# Patient Record
Sex: MALE | Race: WHITE | Employment: OTHER | URBAN - NONMETROPOLITAN AREA
[De-identification: names, ages, dates, MRNs, and addresses within clinical notes are randomized per-mention and may not be internally consistent; named-entity substitution may affect disease eponyms.]

---

## 2018-01-16 ENCOUNTER — OFFICE VISIT (OUTPATIENT)
Dept: SURGERY | Age: 63
End: 2018-01-16

## 2018-01-16 VITALS
RESPIRATION RATE: 16 BRPM | SYSTOLIC BLOOD PRESSURE: 128 MMHG | DIASTOLIC BLOOD PRESSURE: 76 MMHG | WEIGHT: 250.1 LBS | OXYGEN SATURATION: 96 % | HEART RATE: 84 BPM | BODY MASS INDEX: 35.89 KG/M2

## 2018-01-16 DIAGNOSIS — H02.9 LESION OF LOWER EYELID: Primary | ICD-10-CM

## 2018-01-16 PROCEDURE — 99202 OFFICE O/P NEW SF 15 MIN: CPT | Performed by: SURGERY

## 2018-01-16 RX ORDER — GABAPENTIN 300 MG/1
300 CAPSULE ORAL 4 TIMES DAILY
Status: ON HOLD | COMMUNITY
End: 2021-05-10

## 2018-01-16 NOTE — PROGRESS NOTES
carvedilol (COREG) 12.5 MG tablet Take 12.5 mg by mouth 2 times daily (with meals)      metFORMIN (GLUCOPHAGE) 1000 MG tablet Take 1,000 mg by mouth 2 times daily (with meals).  glimepiride (AMARYL) 4 MG tablet Take 4 mg by mouth 2 times daily.  lisinopril (PRINIVIL;ZESTRIL) 20 MG tablet Take 20 mg by mouth daily.  ibuprofen (ADVIL;MOTRIN) 200 MG tablet Take 200 mg by mouth every 6 hours as needed for Pain. No current facility-administered medications on file prior to visit. No Known Allergies    Review of Systems  Pertinent items are noted in HPI. Objective:      Physical Exam  General Skin Exam:  no rashes, no ecchymoses     Lesion    Location:   right lower eyelid   Color:  white    Diameter:  1 mm   Border/Symmetry:  elevated. Inflammation:  absent   Adhesion:  is adherent to adjacent tissues. Assessment:      Lesion of the right lower eyelid. Plan: Will refer to eye surgeon for evaluation.

## 2018-06-12 ENCOUNTER — HOSPITAL ENCOUNTER (OUTPATIENT)
Dept: GENERAL RADIOLOGY | Age: 63
Discharge: HOME | End: 2018-06-12
Attending: FAMILY MEDICINE | Admitting: FAMILY MEDICINE

## 2018-06-12 DIAGNOSIS — R05.9 COUGH: ICD-10-CM

## 2019-05-01 NOTE — PROGRESS NOTES
1. Do not eat or drink anything after 12 midnight (or____hours) unless instructed by your doctor prior to surgery. This includes no water, chewing gum or mints. 2. Follow your directions as prescribed by the doctor for your procedure and medications. 3.Check with your Doctor regarding stopping Plavix, Coumadin, Lovenox,Effient,Pradaxa,Xarelto, Fragmin or other blood thinners and follow their instructions. 4. Do not smoke, and do not drink any alcoholic beverages 24 hours prior to surgery. This includes NA Beer. 5. You may brush your teeth and gargle the morning of surgery. DO NOT SWALLOW WATER   6. You MUST make arrangements for a responsible adult to take you home after your surgery and be able to check on you every couple hours for the day. You will not be allowed to leave alone or drive yourself home. It is strongly suggested someone stay with you the first 24 hrs. Your surgery will be cancelled if you do not have a ride home. 7. Please wear simple, loose fitting clothing to the hospital.  Alvera Factor not bring valuables (money, credit cards, checkbooks, etc.) Do not wear any makeup (including no eye makeup) or nail polish on your fingers or toes. 8. DO NOT wear any jewelry or piercings on day of surgery. All body piercing jewelry must be removed. 9. If you have dentures, they will be removed before going to the OR; we will provide you a container. If you wear contact lenses or glasses, they will be removed; please bring a case for them. 10. If you  have a Living Will and Durable Power of  for Healthcare, please bring in a copy. 11 Please bring picture ID,  insurance card, paperwork from the doctors office    (H & P, Consent, & card for implantable devices).

## 2019-05-06 ENCOUNTER — ANESTHESIA EVENT (OUTPATIENT)
Dept: OPERATING ROOM | Age: 64
End: 2019-05-06
Payer: MEDICAID

## 2019-05-06 NOTE — ANESTHESIA PRE PROCEDURE
Department of Anesthesiology  Preprocedure Note       Name:  Axel Lagos   Age:  66 y.o.  :  1955                                          MRN:  [id number]         Date:  2019      Surgeon: Jeff Cannon):  Vilma Oshea MD    Procedure: COLORECTAL CANCER SCREENING, HIGH RISK (N/A )    Medications prior to admission:   Prior to Admission medications    Medication Sig Start Date End Date Taking? Authorizing Provider   gabapentin (NEURONTIN) 300 MG capsule Take 300 mg by mouth 4 times daily. Historical Provider, MD   sitaGLIPtin (JANUVIA) 100 MG tablet Take 100 mg by mouth daily    Historical Provider, MD   carvedilol (COREG) 12.5 MG tablet Take 12.5 mg by mouth 2 times daily (with meals)    Historical Provider, MD   metFORMIN (GLUCOPHAGE) 1000 MG tablet Take 1,000 mg by mouth 2 times daily (with meals). Historical Provider, MD   glimepiride (AMARYL) 4 MG tablet Take 4 mg by mouth 2 times daily. Historical Provider, MD   lisinopril (PRINIVIL;ZESTRIL) 20 MG tablet Take 20 mg by mouth daily. Historical Provider, MD   ibuprofen (ADVIL;MOTRIN) 200 MG tablet Take 200 mg by mouth every 6 hours as needed for Pain. Historical Provider, MD       Current medications:    No current facility-administered medications for this encounter. Current Outpatient Medications   Medication Sig Dispense Refill    gabapentin (NEURONTIN) 300 MG capsule Take 300 mg by mouth 4 times daily.  sitaGLIPtin (JANUVIA) 100 MG tablet Take 100 mg by mouth daily      carvedilol (COREG) 12.5 MG tablet Take 12.5 mg by mouth 2 times daily (with meals)      metFORMIN (GLUCOPHAGE) 1000 MG tablet Take 1,000 mg by mouth 2 times daily (with meals).  glimepiride (AMARYL) 4 MG tablet Take 4 mg by mouth 2 times daily.  lisinopril (PRINIVIL;ZESTRIL) 20 MG tablet Take 20 mg by mouth daily.  ibuprofen (ADVIL;MOTRIN) 200 MG tablet Take 200 mg by mouth every 6 hours as needed for Pain.          Allergies:  No Known Allergies    Problem List:  There is no problem list on file for this patient. Past Medical History:        Diagnosis Date    Acid reflux     Diabetes mellitus ([address]     Hemochromatosis     Hypertension        Past Surgical History:        Procedure Laterality Date    COLONOSCOPY         Social History:    Social History     Tobacco Use    Smoking status: Never Smoker    Smokeless tobacco: Never Used   Substance Use Topics    Alcohol use: No                                Counseling given: Not Answered      Vital Signs (Current):   Vitals:    05/01/19 1546   Weight: 244 lb (110.7 kg)   Height: 5' 10\" (1.778 m)                                              BP Readings from Last 3 Encounters:   01/16/18 128/76   02/25/16 142/84   02/03/16 156/86       NPO Status:                                                                                 BMI:   Wt Readings from Last 3 Encounters:   01/16/18 250 lb 1.6 oz (113.4 kg)   02/03/16 255 lb (115.7 kg)   01/27/16 255 lb (115.7 kg)     Body mass index is 35.01 kg/m². CBC: No results found for: WBC, RBC, HGB, HCT, MCV, RDW, PLT    CMP: No results found for: NA, K, CL, CO2, BUN, CREATININE, GFRAA, AGRATIO, LABGLOM, GLUCOSE, PROT, CALCIUM, BILITOT, ALKPHOS, AST, ALT    POC Tests: No results for input(s): POCGLU, POCNA, POCK, POCCL, POCBUN, POCHEMO, POCHCT in the last 72 hours.     Coags: No results found for: PROTIME, INR, APTT    HCG (If Applicable): No results found for: PREGTESTUR, PREGSERUM, HCG, HCGQUANT     ABGs: No results found for: PHART, PO2ART, NGH9ONU, AFX7UKY, BEART, O4PNMJYG     Type & Screen (If Applicable):  No results found for: LABABO, [address]    Anesthesia Evaluation  Patient summary reviewed  Airway: Mallampati: III  TM distance: <3 FB   Neck ROM: full  Mouth opening: > = 3 FB Dental: normal exam   (+) upper dentures and lower dentures      Pulmonary:Negative Pulmonary ROS and normal exam                               Cardiovascular:  Exercise tolerance: good (>4 METS),   (+) hypertension:,          Beta Blocker:  Order written         Neuro/Psych:   Negative Neuro/Psych ROS              GI/Hepatic/Renal:   (+) GERD:, bowel prep, morbid obesity          Endo/Other:    (+) Diabetes, . Abdominal:   (+) obese,         Vascular: negative vascular ROS. Anesthesia Plan      MAC     ASA 2       Induction: intravenous. Anesthetic plan and risks discussed with patient. Pre Anesthesia Evaluation complete. Anesthesia plan, risks, benefits, alternatives, and personnel discussed with patient and/or legal guardian. Patient and/or legal guardian verbalized an understanding and agreed to proceed. Anesthesia plan discussed with care team members and agreed upon.   Noel Amaro, ROSCOE - CRNA  5/8/2019

## 2019-05-08 ENCOUNTER — HOSPITAL ENCOUNTER (OUTPATIENT)
Age: 64
Setting detail: OUTPATIENT SURGERY
Discharge: HOME | End: 2019-05-08
Attending: INTERNAL MEDICINE | Admitting: INTERNAL MEDICINE
Payer: MEDICAID

## 2019-05-08 ENCOUNTER — ANESTHESIA (OUTPATIENT)
Dept: OPERATING ROOM | Age: 64
End: 2019-05-08
Payer: MEDICAID

## 2019-05-08 VITALS
OXYGEN SATURATION: 95 % | SYSTOLIC BLOOD PRESSURE: 111 MMHG | TEMPERATURE: 98.6 F | RESPIRATION RATE: 12 BRPM | DIASTOLIC BLOOD PRESSURE: 71 MMHG

## 2019-05-08 VITALS
BODY MASS INDEX: 33.79 KG/M2 | OXYGEN SATURATION: 95 % | HEART RATE: 83 BPM | WEIGHT: 236 LBS | RESPIRATION RATE: 16 BRPM | HEIGHT: 70 IN | SYSTOLIC BLOOD PRESSURE: 117 MMHG | DIASTOLIC BLOOD PRESSURE: 71 MMHG | TEMPERATURE: 97.9 F

## 2019-05-08 LAB — GLUCOSE BLD-MCNC: 228 MG/DL (ref 70–99)

## 2019-05-08 PROCEDURE — 2580000003 HC RX 258: Performed by: INTERNAL MEDICINE

## 2019-05-08 PROCEDURE — 3700000001 HC ADD 15 MINUTES (ANESTHESIA): Performed by: INTERNAL MEDICINE

## 2019-05-08 PROCEDURE — 82962 GLUCOSE BLOOD TEST: CPT

## 2019-05-08 PROCEDURE — 7100000011 HC PHASE II RECOVERY - ADDTL 15 MIN: Performed by: INTERNAL MEDICINE

## 2019-05-08 PROCEDURE — 3700000000 HC ANESTHESIA ATTENDED CARE: Performed by: INTERNAL MEDICINE

## 2019-05-08 PROCEDURE — 3609027000 HC COLONOSCOPY: Performed by: INTERNAL MEDICINE

## 2019-05-08 PROCEDURE — 6360000002 HC RX W HCPCS: Performed by: NURSE ANESTHETIST, CERTIFIED REGISTERED

## 2019-05-08 PROCEDURE — 7100000010 HC PHASE II RECOVERY - FIRST 15 MIN: Performed by: INTERNAL MEDICINE

## 2019-05-08 PROCEDURE — 2500000003 HC RX 250 WO HCPCS: Performed by: NURSE ANESTHETIST, CERTIFIED REGISTERED

## 2019-05-08 RX ORDER — PROPOFOL 10 MG/ML
INJECTION, EMULSION INTRAVENOUS PRN
Status: DISCONTINUED | OUTPATIENT
Start: 2019-05-08 | End: 2019-05-08 | Stop reason: SDUPTHER

## 2019-05-08 RX ORDER — SODIUM CHLORIDE, SODIUM LACTATE, POTASSIUM CHLORIDE, CALCIUM CHLORIDE 600; 310; 30; 20 MG/100ML; MG/100ML; MG/100ML; MG/100ML
INJECTION, SOLUTION INTRAVENOUS CONTINUOUS
Status: DISCONTINUED | OUTPATIENT
Start: 2019-05-08 | End: 2019-05-08 | Stop reason: HOSPADM

## 2019-05-08 RX ORDER — LIDOCAINE HYDROCHLORIDE 20 MG/ML
INJECTION, SOLUTION EPIDURAL; INFILTRATION; INTRACAUDAL; PERINEURAL PRN
Status: DISCONTINUED | OUTPATIENT
Start: 2019-05-08 | End: 2019-05-08 | Stop reason: SDUPTHER

## 2019-05-08 RX ORDER — FENTANYL CITRATE 50 UG/ML
INJECTION, SOLUTION INTRAMUSCULAR; INTRAVENOUS PRN
Status: DISCONTINUED | OUTPATIENT
Start: 2019-05-08 | End: 2019-05-08 | Stop reason: SDUPTHER

## 2019-05-08 RX ADMIN — FENTANYL CITRATE 25 MCG: 50 INJECTION INTRAMUSCULAR; INTRAVENOUS at 08:22

## 2019-05-08 RX ADMIN — SODIUM CHLORIDE, POTASSIUM CHLORIDE, SODIUM LACTATE AND CALCIUM CHLORIDE: 600; 310; 30; 20 INJECTION, SOLUTION INTRAVENOUS at 08:10

## 2019-05-08 RX ADMIN — FENTANYL CITRATE 25 MCG: 50 INJECTION INTRAMUSCULAR; INTRAVENOUS at 08:20

## 2019-05-08 RX ADMIN — LIDOCAINE HYDROCHLORIDE 100 MG: 20 INJECTION, SOLUTION EPIDURAL; INFILTRATION; INTRACAUDAL; PERINEURAL at 08:16

## 2019-05-08 RX ADMIN — FENTANYL CITRATE 50 MCG: 50 INJECTION INTRAMUSCULAR; INTRAVENOUS at 08:18

## 2019-05-08 RX ADMIN — PROPOFOL 110 MG: 10 INJECTION, EMULSION INTRAVENOUS at 08:18

## 2019-05-08 RX ADMIN — PROPOFOL 90 MG: 10 INJECTION, EMULSION INTRAVENOUS at 08:16

## 2019-05-08 ASSESSMENT — PAIN - FUNCTIONAL ASSESSMENT: PAIN_FUNCTIONAL_ASSESSMENT: 0-10

## 2019-05-08 ASSESSMENT — PAIN SCALES - GENERAL
PAINLEVEL_OUTOF10: 0
PAINLEVEL_OUTOF10: 0

## 2019-05-08 NOTE — ANESTHESIA POSTPROCEDURE EVALUATION
Department of Anesthesiology  Postprocedure Note    Patient: Mary Christian  MRN: [id number]  YOB: 1955  Date of evaluation: 5/8/2019  Time:  9:09 AM     Procedure Summary     Date:  05/08/19 Room / Location:  [address] [facility] OR    Anesthesia Start:  7644 Anesthesia Stop:  9755    Procedure:  COLORECTAL CANCER SCREENING, HIGH RISK (N/A ) Diagnosis:  (HX Colon Polyps)    Surgeon:  Jolene Plasencia MD Responsible Provider:  ROSCOE Astorga CRNA    Anesthesia Type:  MAC ASA Status:  2          Anesthesia Type: MAC    Elsy Phase I: Elsy Score: 10    Elsy Phase II:  10    Last vitals: Reviewed and per EMR flowsheets.        Anesthesia Post Evaluation    Patient location during evaluation: PACU  Patient participation: complete - patient participated  Level of consciousness: awake and alert  Pain score: 0  Airway patency: patent  Nausea & Vomiting: no nausea and no vomiting  Complications: no  Cardiovascular status: hemodynamically stable  Respiratory status: room air, spontaneous ventilation and nonlabored ventilation  Hydration status: stable

## 2019-05-08 NOTE — PROGRESS NOTES
6660 Patient transferred from GI lab to [facility] via cart, he is awake and denies needs at this time. 0840 Patient is sitting up drinking a bottle of water. 3052 Dr Coni Tolentino in to talk to patient. 0930 Patient is waiting for his ride to pick him up. 1269 Patient's niece arrived to pick him up.0955 Patient discharged to home,his niece will drive him.

## 2019-05-08 NOTE — BRIEF OP NOTE
Brief Postoperative Note  ______________________________________________________________    Patient: Julisa Meadows  YOB: 1955  MRN: [id number]  Date of Procedure: 5/8/2019    Pre-Op Diagnosis: HX Colon Polyps    Post-Op Diagnosis: normal       Procedure(s):  COLORECTAL CANCER SCREENING, HIGH RISK    Anesthesia: Monitor Anesthesia Care    Surgeon(s):  Erlinda Metcalf MD      Estimated Blood Loss (mL): less than 50     Complications: None                  Erlinda Metcalf MD  Date: 5/8/2019  Time: 8:33 AM

## 2019-09-24 ENCOUNTER — HOSPITAL ENCOUNTER (OUTPATIENT)
Age: 64
Discharge: HOME | End: 2019-09-24
Payer: MEDICAID

## 2019-09-24 LAB
ALBUMIN SERPL-MCNC: 4.3 GM/DL (ref 3.4–5)
ALP BLD-CCNC: 98 IU/L (ref 40–129)
ALT SERPL-CCNC: 37 U/L (ref 10–40)
ANION GAP SERPL CALCULATED.3IONS-SCNC: 10 MMOL/L (ref 4–16)
AST SERPL-CCNC: 32 IU/L (ref 15–37)
BILIRUB SERPL-MCNC: 0.2 MG/DL (ref 0–1)
BUN BLDV-MCNC: 19 MG/DL (ref 6–23)
CALCIUM SERPL-MCNC: 10.4 MG/DL (ref 8.3–10.6)
CHLORIDE BLD-SCNC: 98 MMOL/L (ref 99–110)
CO2: 29 MMOL/L (ref 21–32)
CREAT SERPL-MCNC: 1.5 MG/DL (ref 0.9–1.3)
GFR AFRICAN AMERICAN: 58 ML/MIN/1.73M2
GFR NON-AFRICAN AMERICAN: 48 ML/MIN/1.73M2
GLUCOSE BLD-MCNC: 198 MG/DL (ref 70–99)
MAGNESIUM: 1.8 MG/DL (ref 1.8–2.4)
POTASSIUM SERPL-SCNC: 4.3 MMOL/L (ref 3.5–5.1)
SODIUM BLD-SCNC: 137 MMOL/L (ref 135–145)
TOTAL PROTEIN: 7.4 GM/DL (ref 6.4–8.2)

## 2019-09-24 PROCEDURE — 83735 ASSAY OF MAGNESIUM: CPT

## 2019-09-24 PROCEDURE — 80053 COMPREHEN METABOLIC PANEL: CPT

## 2019-09-24 PROCEDURE — 36415 COLL VENOUS BLD VENIPUNCTURE: CPT

## 2021-02-05 ENCOUNTER — OFFICE VISIT (OUTPATIENT)
Dept: ORTHOPEDIC SURGERY | Age: 66
End: 2021-02-05
Payer: MEDICAID

## 2021-02-05 VITALS — RESPIRATION RATE: 16 BRPM | BODY MASS INDEX: 33.79 KG/M2 | HEIGHT: 70 IN | WEIGHT: 236 LBS

## 2021-02-05 DIAGNOSIS — M75.82 ROTATOR CUFF TENDONITIS, LEFT: Primary | ICD-10-CM

## 2021-02-05 PROCEDURE — 1036F TOBACCO NON-USER: CPT | Performed by: PHYSICIAN ASSISTANT

## 2021-02-05 PROCEDURE — 3017F COLORECTAL CA SCREEN DOC REV: CPT | Performed by: PHYSICIAN ASSISTANT

## 2021-02-05 PROCEDURE — G8484 FLU IMMUNIZE NO ADMIN: HCPCS | Performed by: PHYSICIAN ASSISTANT

## 2021-02-05 PROCEDURE — G8427 DOCREV CUR MEDS BY ELIG CLIN: HCPCS | Performed by: PHYSICIAN ASSISTANT

## 2021-02-05 PROCEDURE — G8417 CALC BMI ABV UP PARAM F/U: HCPCS | Performed by: PHYSICIAN ASSISTANT

## 2021-02-05 PROCEDURE — 99202 OFFICE O/P NEW SF 15 MIN: CPT | Performed by: PHYSICIAN ASSISTANT

## 2021-02-05 PROCEDURE — 20610 DRAIN/INJ JOINT/BURSA W/O US: CPT | Performed by: PHYSICIAN ASSISTANT

## 2021-02-05 RX ORDER — PANTOPRAZOLE SODIUM 40 MG/1
40 TABLET, DELAYED RELEASE ORAL DAILY
COMMUNITY
Start: 2021-01-11

## 2021-02-05 RX ORDER — LANCETS
EACH MISCELLANEOUS
COMMUNITY
Start: 2020-12-18

## 2021-02-05 RX ORDER — CYCLOBENZAPRINE HCL 5 MG
TABLET ORAL
COMMUNITY
Start: 2021-01-19 | End: 2021-05-04

## 2021-02-05 ASSESSMENT — ENCOUNTER SYMPTOMS
RESPIRATORY NEGATIVE: 1
EYES NEGATIVE: 1
GASTROINTESTINAL NEGATIVE: 1

## 2021-02-05 NOTE — PROGRESS NOTES
[address] [facility]      HPI:  Mai Sandoval is a 63 y.o. male that presents to the office today with acute left shoulder pain that is been present for the last 3 or 4 weeks. He rates his pain as high as an 8/10, aching, throbbing and burning pain over the superior and lateral aspect of the shoulder. He has not had any particular injury. He did seek some treatment from a chiropractor and had several treatments and was diagnosed with a possible rotator cuff tear. Pain is worse with raising the arm above the shoulder level. He denies any neck pain. Patient also did seek treatment in the emergency department and was given a steroid Dosepak. The steroids caused his blood sugar to rise dramatically because he is diabetic and he does not feel there was much pain relief.         Past Medical History:   Diagnosis Date    Acid reflux     Diabetes mellitus ([address]     Hemochromatosis     Hypertension        Past Surgical History:   Procedure Laterality Date    COLONOSCOPY  05/08/2019    normal colonoscopy    COLONOSCOPY N/A 5/8/2019    COLORECTAL CANCER SCREENING, HIGH RISK performed by Katherine Field MD at [place] [address]       Family History   Problem Relation Age of Onset    Cancer Father     Cancer Brother        Social History     Socioeconomic History    Marital status:      Spouse name: None    Number of children: None    Years of education: None    Highest education level: None   Occupational History    None   Social Needs    Financial resource strain: None    Food insecurity     Worry: None     Inability: None    Transportation needs     Medical: None     Non-medical: None   Tobacco Use    Smoking status: Never Smoker    Smokeless tobacco: Never Used   Substance and Sexual Activity    Alcohol use: No    Drug use: No    Sexual activity: None   Lifestyle    Physical activity     Days per week: None     Minutes per session: None    Stress: None   Relationships  Social connections     Talks on phone: None     Gets together: None     Attends Jainism service: None     Active member of club or organization: None     Attends meetings of clubs or organizations: None     Relationship status: None    Intimate partner violence     Fear of current or ex partner: None     Emotionally abused: None     Physically abused: None     Forced sexual activity: None   Other Topics Concern    None   Social History Narrative    None       Current Outpatient Medications   Medication Sig Dispense Refill    cyclobenzaprine (FLEXERIL) 5 MG tablet       ONE TOUCH ULTRASOFT LANCETS MISC       pantoprazole (PROTONIX) 40 MG tablet       gabapentin (NEURONTIN) 300 MG capsule Take 300 mg by mouth 4 times daily.  sitaGLIPtin (JANUVIA) 100 MG tablet Take 100 mg by mouth daily      carvedilol (COREG) 12.5 MG tablet Take 12.5 mg by mouth 2 times daily (with meals)      metFORMIN (GLUCOPHAGE) 1000 MG tablet Take 1,000 mg by mouth 2 times daily (with meals).  glimepiride (AMARYL) 4 MG tablet Take 4 mg by mouth 2 times daily.  lisinopril (PRINIVIL;ZESTRIL) 20 MG tablet Take 20 mg by mouth daily. No current facility-administered medications for this visit. No Known Allergies    Vitals:    02/05/21 1048   Resp: 16   Weight: 236 lb (107 kg)   Height: 5' 10\" (1.778 m)         Review of Systems:   Review of Systems   Constitutional: Negative. HENT: Negative. Eyes: Negative. Respiratory: Negative. Cardiovascular: Negative. Gastrointestinal: Negative. Genitourinary: Negative. Musculoskeletal: Positive for arthralgias and myalgias. Skin: Negative. Neurological: Negative. Psychiatric/Behavioral: Negative.            Physical Exam: Gen/Psych:Examination reveals a pleasant individual in no acute distress. The patient is oriented to time, place and person. The patient's mood and affect are appropriate. Patient appears well nourished. Body habitus is overweight    HEENT: Head is atraumatic normocephalic, ears are symmetric, eyes show equal pupils bilaterally, extraocular muscles intact. Hearing is intact to normal voice at 5 feet. Nares are patent bilaterally, no epistaxis, no rhinorrhea. Lymph:  No obvious lymphedema in bilateral upper extremities     Skin: Intact in bilateral upper extremities with no ulcerations, lesions, rash, erythema. Vascular: There are no varicosities in bilateral upper  extremities, sensation intact to light touch over bilateral upper extremities. Musculoskeletal:  Left shoulder exam:  Skin:  Clear with no erythema, there is no significant joint effusion  Deformity:  none  Atrophy:  none  Tenderness: AC joint, anterior lateral acromion, deltoid. Active ROM:   FE:180    IR side: L4           ER side: 30   Ad/Abduction: 170      Passive ROM is the same as active   F/E:   IR side:   ER side:   Ad/Abduction:  Strength: FE:5/5     ER:5/5 IR:5/5      Elbow flexion: 5/5  Neer:  mildly positive  Kauffman:  moderately positive      Outside Record review: ER records    Imagin views of the left shoulder taken and reviewed in the office today show moderate acromioclavicular joint osteoarthritis, previous midshaft clavicle fracture which is well-healed with mild fracture deformity. No significant glenohumeral joint arthritis. No current fractures or dislocations. The official read and interpretation of these x-rays will be done by the the [facility]       Assessment:    Diagnosis Orders   1.  Rotator cuff tendonitis, left  CA ARTHROCENTESIS ASPIR&/INJ MAJOR JT/BURSA W/O US          Plan:   Patient Instructions   Continue to weight bear as tolerated  Continue range of motion Ice and elevate as needed  Tylenol or Motrin for pain  Injection given today in the office   Rest for 24-48 hours  Follow up a in 6 weeks    Remember to monitor your blood sugar more closely over the next week as the steroid injection may cause an elevation of the blood sugar for up to 7 days. Left subacromial Bursa Aspiration / Injection Procedure:  Multiple treatment options were discussed. This injection was recommended as a part of the overall treatment plan. Details of the procedure, potential risks, and potential benefits were discussed. Patient's questions were answered. Patient elected to proceed with procedure. Medication: 1 mL Kenalog 40 mg/ML, 1 mL 0.5% bupivacaine, 1 mL 1% lidocaine  Procedure:  Sterile technique was used as the skin over the injection site was prepped with alcohol. The left subacromial bursa was then injected with the above listed medication. A sterile bandage was placed over the injection site. The patient tolerated the procedure well without complication. *Please note this report has been partially produced using speech recognition Dragon software and may contain errors related to that system including errors in grammar, punctuation, and spelling, as well as words and phrases that may be inappropriate.  If there are any questions or concerns please feel free to contact the dictating provider for clarification

## 2021-02-05 NOTE — PATIENT INSTRUCTIONS
Continue to weight bear as tolerated  Continue range of motion  Ice and elevate as needed  Tylenol or Motrin for pain  Injection given today in the office   Rest for 24-48 hours  Follow up a in 6 weeks

## 2021-02-25 ENCOUNTER — TELEPHONE (OUTPATIENT)
Dept: ORTHOPEDIC SURGERY | Age: 66
End: 2021-02-25

## 2021-02-25 DIAGNOSIS — M75.82 ROTATOR CUFF TENDONITIS, LEFT: Primary | ICD-10-CM

## 2021-02-25 NOTE — TELEPHONE ENCOUNTER
Pt called into the office today. Pt states he is having increased pain in his shoulder and wanted to know if he could have a prescription of 800 mg of ibprofen sent to his pharmacy, if possible. Please Advise.     Phone: [phone]

## 2021-02-26 RX ORDER — IBUPROFEN 800 MG/1
800 TABLET ORAL
Qty: 90 TABLET | Refills: 0 | Status: SHIPPED | OUTPATIENT
Start: 2021-02-26 | End: 2022-08-14 | Stop reason: ALTCHOICE

## 2021-03-05 ENCOUNTER — OFFICE VISIT (OUTPATIENT)
Dept: ORTHOPEDIC SURGERY | Age: 66
End: 2021-03-05
Payer: MEDICAID

## 2021-03-05 VITALS — BODY MASS INDEX: 33.79 KG/M2 | RESPIRATION RATE: 16 BRPM | HEIGHT: 70 IN | WEIGHT: 236 LBS

## 2021-03-05 DIAGNOSIS — M75.82 ROTATOR CUFF TENDONITIS, LEFT: Primary | ICD-10-CM

## 2021-03-05 PROCEDURE — 99212 OFFICE O/P EST SF 10 MIN: CPT | Performed by: PHYSICIAN ASSISTANT

## 2021-03-05 PROCEDURE — G8484 FLU IMMUNIZE NO ADMIN: HCPCS | Performed by: PHYSICIAN ASSISTANT

## 2021-03-05 PROCEDURE — G8427 DOCREV CUR MEDS BY ELIG CLIN: HCPCS | Performed by: PHYSICIAN ASSISTANT

## 2021-03-05 PROCEDURE — G8417 CALC BMI ABV UP PARAM F/U: HCPCS | Performed by: PHYSICIAN ASSISTANT

## 2021-03-05 PROCEDURE — 3017F COLORECTAL CA SCREEN DOC REV: CPT | Performed by: PHYSICIAN ASSISTANT

## 2021-03-05 PROCEDURE — 1036F TOBACCO NON-USER: CPT | Performed by: PHYSICIAN ASSISTANT

## 2021-03-05 NOTE — PROGRESS NOTES
[facility]      HPI:  Denisha Murphy is a 63 y.o. male that presents to the office today after steroid injection given on 2/5/2021. He states that he still has pain and he rates it at a 5/10 radiating into the deltoid.      Past Medical History:   Diagnosis Date    Acid reflux     Diabetes mellitus ([facility]     Hemochromatosis     Hypertension        Past Surgical History:   Procedure Laterality Date    COLONOSCOPY  05/08/2019    normal colonoscopy    COLONOSCOPY N/A 5/8/2019    COLORECTAL CANCER SCREENING, HIGH RISK performed by Jordan Main MD at [place] [address]       Family History   Problem Relation Age of Onset    Cancer Father     Cancer Brother        Social History     Socioeconomic History    Marital status:      Spouse name: None    Number of children: None    Years of education: None    Highest education level: None   Occupational History    None   Social Needs    Financial resource strain: None    Food insecurity     Worry: None     Inability: None    Transportation needs     Medical: None     Non-medical: None   Tobacco Use    Smoking status: Never Smoker    Smokeless tobacco: Never Used   Substance and Sexual Activity    Alcohol use: No    Drug use: No    Sexual activity: None   Lifestyle    Physical activity     Days per week: None     Minutes per session: None    Stress: None   Relationships    Social connections     Talks on phone: None     Gets together: None     Attends [employer] service: None     Active member of club or organization: None     Attends meetings of clubs or organizations: None     Relationship status: None    Intimate partner violence     Fear of current or ex partner: None     Emotionally abused: None     Physically abused: None     Forced sexual activity: None   Other Topics Concern    None   Social History Narrative    None       Current Outpatient Medications   Medication Sig Dispense Refill

## 2021-03-05 NOTE — PATIENT INSTRUCTIONS
Follow-up in 2-3 weeks, I If not better.  Patient will contact the office  Weightbearing as tolerated  Over-the-counter occasions as needed for pain

## 2021-03-15 ENCOUNTER — TELEPHONE (OUTPATIENT)
Dept: ORTHOPEDIC SURGERY | Age: 66
End: 2021-03-15

## 2021-03-15 DIAGNOSIS — M75.82 ROTATOR CUFF TENDONITIS, LEFT: Primary | ICD-10-CM

## 2021-03-31 ENCOUNTER — TELEPHONE (OUTPATIENT)
Dept: ORTHOPEDIC SURGERY | Age: 66
End: 2021-03-31

## 2021-03-31 DIAGNOSIS — F41.9 ANXIETY: Primary | ICD-10-CM

## 2021-03-31 RX ORDER — LORAZEPAM 0.5 MG/1
0.5 TABLET ORAL ONCE
Qty: 1 TABLET | Refills: 0 | Status: SHIPPED | OUTPATIENT
Start: 2021-03-31 | End: 2021-03-31

## 2021-04-01 ENCOUNTER — HOSPITAL ENCOUNTER (OUTPATIENT)
Dept: GENERAL RADIOLOGY | Age: 66
Discharge: HOME | End: 2021-04-01
Payer: MEDICAID

## 2021-04-01 ENCOUNTER — HOSPITAL ENCOUNTER (OUTPATIENT)
Dept: MRI IMAGING | Age: 66
Discharge: HOME | End: 2021-04-01
Payer: MEDICAID

## 2021-04-01 DIAGNOSIS — M75.82 ROTATOR CUFF TENDONITIS, LEFT: ICD-10-CM

## 2021-04-01 PROCEDURE — 73221 MRI JOINT UPR EXTREM W/O DYE: CPT

## 2021-04-01 PROCEDURE — 70200 X-RAY EXAM OF EYE SOCKETS: CPT

## 2021-04-02 ENCOUNTER — TELEPHONE (OUTPATIENT)
Dept: ORTHOPEDIC SURGERY | Age: 66
End: 2021-04-02

## 2021-04-05 NOTE — TELEPHONE ENCOUNTER
KACI for patient advising patient to return call to our office to schedule follow up with Dr. Pardeep Ohara to discuss surgery for rotator cuff tear.

## 2021-04-13 ENCOUNTER — OFFICE VISIT (OUTPATIENT)
Dept: ORTHOPEDIC SURGERY | Age: 66
End: 2021-04-13
Payer: MEDICAID

## 2021-04-13 VITALS
HEART RATE: 84 BPM | RESPIRATION RATE: 15 BRPM | OXYGEN SATURATION: 97 % | BODY MASS INDEX: 33.79 KG/M2 | HEIGHT: 70 IN | WEIGHT: 236 LBS

## 2021-04-13 DIAGNOSIS — M75.42 IMPINGEMENT SYNDROME, SHOULDER, LEFT: ICD-10-CM

## 2021-04-13 DIAGNOSIS — M19.012 OSTEOARTHRITIS, LOCALIZED, SHOULDER, LEFT: ICD-10-CM

## 2021-04-13 DIAGNOSIS — S46.012A TRAUMATIC COMPLETE TEAR OF LEFT ROTATOR CUFF, INITIAL ENCOUNTER: Primary | ICD-10-CM

## 2021-04-13 PROCEDURE — G8417 CALC BMI ABV UP PARAM F/U: HCPCS | Performed by: ORTHOPAEDIC SURGERY

## 2021-04-13 PROCEDURE — 1036F TOBACCO NON-USER: CPT | Performed by: ORTHOPAEDIC SURGERY

## 2021-04-13 PROCEDURE — 99214 OFFICE O/P EST MOD 30 MIN: CPT | Performed by: ORTHOPAEDIC SURGERY

## 2021-04-13 PROCEDURE — 3017F COLORECTAL CA SCREEN DOC REV: CPT | Performed by: ORTHOPAEDIC SURGERY

## 2021-04-13 PROCEDURE — G8427 DOCREV CUR MEDS BY ELIG CLIN: HCPCS | Performed by: ORTHOPAEDIC SURGERY

## 2021-04-13 RX ORDER — HYDROCODONE BITARTRATE AND ACETAMINOPHEN 5; 325 MG/1; MG/1
1 TABLET ORAL EVERY 6 HOURS PRN
Status: ON HOLD | COMMUNITY
Start: 2021-03-29 | End: 2021-05-10

## 2021-04-13 RX ORDER — BLOOD SUGAR DIAGNOSTIC
STRIP MISCELLANEOUS
COMMUNITY
Start: 2021-04-12

## 2021-04-13 NOTE — PATIENT INSTRUCTIONS
Continue weight-bearing as tolerated. Continue range of motion exercises as instructed. Ice and elevate as needed. Tylenol or Motrin for pain. We will schedule surgery at soonest convenience. If you have any questions regarding your surgery please call our office and ask to speak with Carmina.  [phone]

## 2021-04-13 NOTE — PROGRESS NOTES
Patient presents to the office today for left shoulder pain. Pt states onset of pain about a month ago. Pt states that pain is a 7/10 today. Pt states that he has loss of ROM of the left shoulder. Pt is left hand dominate. Patient states that he did receive a steroid injection on 2/5/21 that did not help with the pain. Pt states that he does use ice and take norco which does not help with the pain. Pt denies any injury or accident to the left shoulder. Pt states he does see the chiropractor and he believes he may have injured his shoulder being adjusted. MRI of the left shoulder completed on 4/1/21  Impression   Complete full-thickness tear of the supraspinatus tendon with medial tendon   retraction by approximately 1.8 cm.  Moderate tendinosis affecting the torn   supraspinatus tendon fibers.       The tear extends posteriorly to disrupt the articular surface fibers of the   junctional fibers of the supraspinatus and infraspinatus tendons resulting in   mild-to-moderate partial-thickness tear.  Additional mild-to-moderate   partial-thickness insertional tear of the more posterior insertional fibers   of the infraspinatus tendon.       Mild-to-moderate tendinosis affecting the subscapularis tendon.       Mild atrophy of the supraspinatus, infraspinatus and superior fibers of the   subscapularis muscle.       Moderate tendinosis affecting the intra-articular portion of the long head of   biceps tendon.       Moderate osteoarthritis affecting the acromioclavicular joint.       Mild-to-moderate subacromial/subdeltoid bursitis.

## 2021-04-18 PROBLEM — M75.42 IMPINGEMENT SYNDROME, SHOULDER, LEFT: Status: ACTIVE | Noted: 2021-04-18

## 2021-04-18 PROBLEM — S46.012A TRAUMATIC COMPLETE TEAR OF LEFT ROTATOR CUFF: Status: ACTIVE | Noted: 2021-04-18

## 2021-04-18 PROBLEM — M19.012 OSTEOARTHRITIS, LOCALIZED, SHOULDER, LEFT: Status: ACTIVE | Noted: 2021-04-18

## 2021-04-18 ASSESSMENT — ENCOUNTER SYMPTOMS
VOMITING: 0
EYE PAIN: 0
WHEEZING: 0
CHEST TIGHTNESS: 0
EYE REDNESS: 0
SHORTNESS OF BREATH: 0
COLOR CHANGE: 0

## 2021-04-19 NOTE — PROGRESS NOTES
4/13/2021   Chief Complaint   Patient presents with    Shoulder Pain     left shoulder MRI         History of Present Illness:                             Marcos العراقي is a 63 y.o. male who presents today for evaluation of his left shoulder pain and weakness. He has had about 3 months of shoulder pain and dysfunction. He thinks his shoulder may been aggravated from some chiropractic treatments. He had a manipulation performed and then developed sharp stabbing pains along the lateral aspect of his shoulder. Pain is worse with overhead reaching and movements. He feels weakness with reaching away from his body or up overhead. Pain is sharp and severe at the superior aspect of the shoulder radiates down the lateral aspect of his left arm. He has been treated with oral anti-inflammatory medications and a steroid injection. His symptoms have not responded to these treatments and he continues to have pain constant throughout the day and worse with lifting pushing pulling activities and also while laying on that side at night. He recently had his MRI is here today for a review the results    Patient presents to the office today for left shoulder pain. Pt states onset of pain about a month ago. Pt states that pain is a 7/10 today. Pt states that he has loss of ROM of the left shoulder. Pt is left hand dominate. Patient states that he did receive a steroid injection on 2/5/21 that did not help with the pain. Pt states that he does use ice and take norco which does not help with the pain. Pt denies any injury or accident to the left shoulder. Pt states he does see the chiropractor and he believes he may have injured his shoulder being adjusted.                 Medical History  Patient's medications, allergies, past medical, surgical, social and family histories were reviewed and updated as appropriate.     Past Medical History:   Diagnosis Date    Acid reflux     Diabetes mellitus ([facility] [address]     Hemochromatosis  Hypertension      Past Surgical History:   Procedure Laterality Date    COLONOSCOPY  05/08/2019    normal colonoscopy    COLONOSCOPY N/A 5/8/2019    COLORECTAL CANCER SCREENING, HIGH RISK performed by Sole Sahu MD at [place] [address]     Family History   Problem Relation Age of Onset    Cancer Father     Cancer Brother      Social History     Socioeconomic History    Marital status:      Spouse name: None    Number of children: None    Years of education: None    Highest education level: None   Occupational History    None   Social Needs    Financial resource strain: None    Food insecurity     Worry: None     Inability: None    Transportation needs     Medical: None     Non-medical: None   Tobacco Use    Smoking status: Never Smoker    Smokeless tobacco: Never Used   Substance and Sexual Activity    Alcohol use: No    Drug use: No    Sexual activity: None   Lifestyle    Physical activity     Days per week: None     Minutes per session: None    Stress: None   Relationships    Social connections     Talks on phone: None     Gets together: None     Attends Mandaen service: None     Active member of club or organization: None     Attends meetings of clubs or organizations: None     Relationship status: None    Intimate partner violence     Fear of current or ex partner: None     Emotionally abused: None     Physically abused: None     Forced sexual activity: None   Other Topics Concern    None   Social History Narrative    None     Current Outpatient Medications   Medication Sig Dispense Refill    HYDROcodone-acetaminophen (NORCO) 5-325 MG per tablet Take 1 tablet by mouth every 6 hours as needed.       blood glucose test strips (ONETOUCH ULTRA) strip CHECK BLOOD GLUCOSE TWICE DAILY      Pseudoephedrine-DM-GG 60- MG TABS Take 1 tablet by mouth every 4 hours as needed      ibuprofen (ADVIL;MOTRIN) 800 MG tablet Take 1 tablet by mouth 3 times daily (with meals) 90 tablet 0    cyclobenzaprine (FLEXERIL) 5 MG tablet       ONE TOUCH ULTRASOFT LANCETS MISC       pantoprazole (PROTONIX) 40 MG tablet       gabapentin (NEURONTIN) 300 MG capsule Take 300 mg by mouth 4 times daily.  sitaGLIPtin (JANUVIA) 100 MG tablet Take 100 mg by mouth daily      carvedilol (COREG) 12.5 MG tablet Take 12.5 mg by mouth 2 times daily (with meals)      metFORMIN (GLUCOPHAGE) 1000 MG tablet Take 1,000 mg by mouth 2 times daily (with meals).  glimepiride (AMARYL) 4 MG tablet Take 4 mg by mouth 2 times daily.  lisinopril (PRINIVIL;ZESTRIL) 20 MG tablet Take 20 mg by mouth daily. No current facility-administered medications for this visit. No Known Allergies    Review of Systems:   Review of Systems   Constitutional: Negative for chills and fever. HENT: Negative for congestion and sneezing. Eyes: Negative for pain and redness. Respiratory: Negative for chest tightness, shortness of breath and wheezing. Cardiovascular: Negative for chest pain and palpitations. Gastrointestinal: Negative for vomiting. Musculoskeletal: Positive for arthralgias. Skin: Negative for color change and rash. Neurological: Positive for weakness. Negative for numbness. Psychiatric/Behavioral: Negative for agitation. The patient is not nervous/anxious. Examination:  General Exam:  Vitals: Pulse 84   Resp 15   Ht 5' 10\" (1.778 m)   Wt 236 lb (107 kg)   SpO2 97%   BMI 33.86 kg/m²    Physical Exam  Vitals signs and nursing note reviewed. Constitutional:       Appearance: Normal appearance. HENT:      Head: Normocephalic and atraumatic. Eyes:      Conjunctiva/sclera: Conjunctivae normal.      Pupils: Pupils are equal, round, and reactive to light. Neck:      Musculoskeletal: Normal range of motion.    Pulmonary:      Effort: Pulmonary effort is normal.   Musculoskeletal:      Right shoulder: He exhibits normal range of motion, no tenderness, no bony tenderness, no swelling, no deformity, no laceration, no pain and normal strength. Right elbow: Normal.     Left elbow: Normal. He exhibits normal range of motion, no swelling, no effusion, no deformity and no laceration. No tenderness found. Comments: Left Upper Extremity:    There is moderate to severe tenderness diffusely throughout the shoulder. Tenderness to palpation is maximal over the anterior and lateral aspects of the shoulder specifically along the greater tuberosity and proximal biceps tendon. Active range of motion is limited due to pain. Forward elevation present to 100 degrees, abduction present to 80 degrees, external rotation 15 degrees. Passive range of motion is moderately restricted and limited due to pain and apprehension. Forward elevation 120 degrees, abduction 100 degrees. Rotator cuff testing is difficult due to pain. Strength 4/5 forward elevation and abduction of the shoulder. There is breakaway to strength testing due to pain. Positive empty can test.  Positive drop arm sign. Positive Kauffman and Neer signs. Moderate pain at the acromioclavicular joint with crossarm test.    Skin is intact. Sensation is intact in the upper extremity. 2+ radial pulse. No edema. No muscle atrophy. Skin:     General: Skin is warm and dry. Neurological:      Mental Status: He is alert and oriented to person, place, and time. Psychiatric:         Mood and Affect: Mood normal.         Behavior: Behavior normal.            Diagnostic testing:  X-ray images were reviewed by myself and discussed with the patient:  Mild degenerative changes of the acromioclavicular joint. Normal position of the glenohumeral joint. No evidence of fracture or acute abnormality. Preserved joint space is present at the glenohumeral joint.     MRI images of the left shoulder were reviewed by myself and discussed with the patient:    MRI of the left shoulder completed on 4/1/21  Impression Complete full-thickness tear of the supraspinatus tendon with medial tendon   retraction by approximately 1.8 cm.  Moderate tendinosis affecting the torn   supraspinatus tendon fibers.       The tear extends posteriorly to disrupt the articular surface fibers of the   junctional fibers of the supraspinatus and infraspinatus tendons resulting in   mild-to-moderate partial-thickness tear.  Additional mild-to-moderate   partial-thickness insertional tear of the more posterior insertional fibers   of the infraspinatus tendon.       Mild-to-moderate tendinosis affecting the subscapularis tendon.       Mild atrophy of the supraspinatus, infraspinatus and superior fibers of the   subscapularis muscle.       Moderate tendinosis affecting the intra-articular portion of the long head of   biceps tendon.       Moderate osteoarthritis affecting the acromioclavicular joint.       Mild-to-moderate subacromial/subdeltoid bursitis.               Office Procedures:  No orders of the defined types were placed in this encounter. Assessment and Plan  1. Left shoulder rotator cuff tear    2. Left shoulder impingement syndrome and acromioclavicular joint primary osteoarthritis    I reviewed the findings of the MRI with the patient. We discussed the severity of the injury to the rotator cuff. Given the patient's symptoms and the findings on the MRI I have recommended that we proceed with shoulder arthroscopy for rotator cuff repair and subacromial decompression. Discussed the prominence of the degenerative change at the acromioclavicular joint with inferior spurring and mass-effect on the rotator cuff. I recommend that we perform arthroscopic distal clavicle excision at the time of the rotator cuff repair to address his symptoms of arthritis as well as the impingement of his rotator cuff    We discussed the risks and benefits of surgery.   We discussed the postoperative course and need for initial immobilization followed by subsequent rehabilitation and physical therapy. We discussed the potential risks with surgery including possible incomplete repair or rerupture after successful repair. We discussed the potential for residual pain, weakness, or stiffness at the shoulder despite appropriate surgical intervention. We discussed the goals of surgery to restore range of motion and strength which may take months to return. The patient understands and would like to proceed with shoulder arthroscopy for rotator cuff repair, distal clavicle excision, and subacromial decompression. The patient was counseled at length about the risks of reilly Covid-19 during their perioperative period and any recovery window from their procedure. The patient was made aware that reilly Covid-19  may worsen their prognosis for recovering from their procedure  and lend to a higher morbidity and/or mortality risk. All material risks, benefits, and reasonable alternatives including postponing the procedure were discussed. The patient does wish to proceed with the procedure at this time.               Electronically signed by Wm Corona MD on 4/18/2021 at 8:07 PM

## 2021-04-23 ENCOUNTER — TELEPHONE (OUTPATIENT)
Dept: ORTHOPEDIC SURGERY | Age: 66
End: 2021-04-23

## 2021-04-26 ENCOUNTER — TELEPHONE (OUTPATIENT)
Dept: ORTHOPEDIC SURGERY | Age: 66
End: 2021-04-26

## 2021-04-26 NOTE — TELEPHONE ENCOUNTER
Calin Cuadra from [facility] left voicemail on Friday 4/23/21.  Patients surgery has been approved  Ref# [id number]

## 2021-04-28 DIAGNOSIS — Z01.818 PRE-OP EXAM: Primary | ICD-10-CM

## 2021-05-04 ENCOUNTER — HOSPITAL ENCOUNTER (OUTPATIENT)
Dept: PREADMISSION TESTING | Age: 66
Discharge: HOME | End: 2021-05-08
Payer: MEDICAID

## 2021-05-04 VITALS
WEIGHT: 237 LBS | DIASTOLIC BLOOD PRESSURE: 73 MMHG | SYSTOLIC BLOOD PRESSURE: 144 MMHG | RESPIRATION RATE: 20 BRPM | OXYGEN SATURATION: 95 % | HEART RATE: 92 BPM | HEIGHT: 71 IN | TEMPERATURE: 96.9 F | BODY MASS INDEX: 33.18 KG/M2

## 2021-05-04 DIAGNOSIS — Z01.818 PRE-OP EXAM: ICD-10-CM

## 2021-05-04 LAB
ANION GAP SERPL CALCULATED.3IONS-SCNC: 10 MMOL/L (ref 4–16)
BUN BLDV-MCNC: 28 MG/DL (ref 6–23)
CALCIUM SERPL-MCNC: 10.2 MG/DL (ref 8.3–10.6)
CHLORIDE BLD-SCNC: 98 MMOL/L (ref 99–110)
CO2: 24 MMOL/L (ref 21–32)
CREAT SERPL-MCNC: 1.2 MG/DL (ref 0.9–1.3)
GFR AFRICAN AMERICAN: >60 ML/MIN/1.73M2
GFR NON-AFRICAN AMERICAN: >60 ML/MIN/1.73M2
GLUCOSE BLD-MCNC: 311 MG/DL (ref 70–99)
HCT VFR BLD CALC: 37.3 % (ref 42–52)
HEMOGLOBIN: 12.4 GM/DL (ref 13.5–18)
MCH RBC QN AUTO: 30.3 PG (ref 27–31)
MCHC RBC AUTO-ENTMCNC: 33.2 % (ref 32–36)
MCV RBC AUTO: 91.2 FL (ref 78–100)
PDW BLD-RTO: 12.4 % (ref 11.7–14.9)
PLATELET # BLD: 251 K/CU MM (ref 140–440)
PMV BLD AUTO: 10.9 FL (ref 7.5–11.1)
POTASSIUM SERPL-SCNC: 4.9 MMOL/L (ref 3.5–5.1)
RBC # BLD: 4.09 M/CU MM (ref 4.6–6.2)
SARS-COV-2: NOT DETECTED
SODIUM BLD-SCNC: 132 MMOL/L (ref 135–145)
SOURCE: NORMAL
WBC # BLD: 7.1 K/CU MM (ref 4–10.5)

## 2021-05-04 PROCEDURE — 36415 COLL VENOUS BLD VENIPUNCTURE: CPT

## 2021-05-04 PROCEDURE — U0003 INFECTIOUS AGENT DETECTION BY NUCLEIC ACID (DNA OR RNA); SEVERE ACUTE RESPIRATORY SYNDROME CORONAVIRUS 2 (SARS-COV-2) (CORONAVIRUS DISEASE [COVID-19]), AMPLIFIED PROBE TECHNIQUE, MAKING USE OF HIGH THROUGHPUT TECHNOLOGIES AS DESCRIBED BY CMS-2020-01-R: HCPCS

## 2021-05-04 PROCEDURE — 85027 COMPLETE CBC AUTOMATED: CPT

## 2021-05-04 PROCEDURE — U0005 INFEC AGEN DETEC AMPLI PROBE: HCPCS

## 2021-05-04 PROCEDURE — 80048 BASIC METABOLIC PNL TOTAL CA: CPT

## 2021-05-04 ASSESSMENT — PAIN DESCRIPTION - PAIN TYPE: TYPE: CHRONIC PAIN

## 2021-05-04 ASSESSMENT — PAIN DESCRIPTION - ONSET: ONSET: ON-GOING

## 2021-05-04 ASSESSMENT — PAIN DESCRIPTION - DESCRIPTORS: DESCRIPTORS: ACHING;NAGGING;DISCOMFORT

## 2021-05-04 ASSESSMENT — PAIN DESCRIPTION - FREQUENCY: FREQUENCY: CONTINUOUS

## 2021-05-04 ASSESSMENT — PAIN DESCRIPTION - LOCATION: LOCATION: SHOULDER

## 2021-05-07 ENCOUNTER — ANESTHESIA EVENT (OUTPATIENT)
Dept: OPERATING ROOM | Age: 66
End: 2021-05-07
Payer: MEDICAID

## 2021-05-07 NOTE — ANESTHESIA PRE PROCEDURE
strip CHECK BLOOD GLUCOSE TWICE DAILY      ibuprofen (ADVIL;MOTRIN) 800 MG tablet Take 1 tablet by mouth 3 times daily (with meals) 90 tablet 0    ONE TOUCH ULTRASOFT LANCETS MISC       pantoprazole (PROTONIX) 40 MG tablet 40 mg daily       gabapentin (NEURONTIN) 300 MG capsule Take 300 mg by mouth 4 times daily.  sitaGLIPtin (JANUVIA) 100 MG tablet Take 100 mg by mouth daily      carvedilol (COREG) 12.5 MG tablet Take 12.5 mg by mouth 2 times daily (with meals)      metFORMIN (GLUCOPHAGE) 1000 MG tablet Take 1,000 mg by mouth 2 times daily (with meals).  glimepiride (AMARYL) 4 MG tablet Take 4 mg by mouth 2 times daily.  lisinopril (PRINIVIL;ZESTRIL) 20 MG tablet Take 20 mg by mouth daily. Allergies:  No Known Allergies    Problem List:    Patient Active Problem List   Diagnosis Code    Osteoarthritis, localized, shoulder, left M19.012    Traumatic complete tear of left rotator cuff S46.012A    Impingement syndrome, shoulder, left M75.42       Past Medical History:        Diagnosis Date    Acid reflux     Arthritis     Diabetes mellitus ([facility] [address]     dx since approx 2015\"    Full dentures     Hemochromatosis     \"have to much iron- last time they removed some blood was in Jan 2021\"follow with [facility] Palmira [facility] in [facility] for this    Hypertension     \"on medication since 2016\" follows with PCP    Wears glasses        Past Surgical History:        Procedure Laterality Date    COLONOSCOPY  05/08/2019    normal colonoscopy    COLONOSCOPY N/A 5/8/2019    COLORECTAL CANCER SCREENING, HIGH RISK performed by Nayeli Jenkins MD at [address] [place] [address]         Social History:    Social History     Tobacco Use    Smoking status: Never Smoker    Smokeless tobacco: Never Used   Substance Use Topics    Alcohol use: Not Currently                                Counseling given: Not Answered      Vital Signs (Current): There were no vitals filed for this visit. BP Readings from Last 3 Encounters:   05/04/21 (!) 144/73   05/08/19 111/71   05/08/19 117/71       NPO Status:                                                                                 BMI:   Wt Readings from Last 3 Encounters:   05/04/21 237 lb (107.5 kg)   04/13/21 236 lb (107 kg)   03/05/21 236 lb (107 kg)     There is no height or weight on file to calculate BMI.    CBC:   Lab Results   Component Value Date    WBC 7.1 05/04/2021    RBC 4.09 05/04/2021    HGB 12.4 05/04/2021    HCT 37.3 05/04/2021    MCV 91.2 05/04/2021    RDW 12.4 05/04/2021     05/04/2021       CMP:   Lab Results   Component Value Date     05/04/2021    K 4.9 05/04/2021    CL 98 05/04/2021    CO2 24 05/04/2021    BUN 28 05/04/2021    CREATININE 1.2 05/04/2021    GFRAA >60 05/04/2021    LABGLOM >60 05/04/2021    GLUCOSE 311 05/04/2021    PROT 7.4 09/24/2019    CALCIUM 10.2 05/04/2021    BILITOT 0.2 09/24/2019    ALKPHOS 98 09/24/2019    AST 32 09/24/2019    ALT 37 09/24/2019       POC Tests: No results for input(s): POCGLU, POCNA, POCK, POCCL, POCBUN, POCHEMO, POCHCT in the last 72 hours. Coags: No results found for: PROTIME, INR, APTT    HCG (If Applicable): No results found for: PREGTESTUR, PREGSERUM, HCG, HCGQUANT     ABGs: No results found for: PHART, PO2ART, YNR7GMN, BAB1SXV, BEART, B0FCVFRI     Type & Screen (If Applicable):  No results found for: LABABO, LABRH    Drug/Infectious Status (If Applicable):  No results found for: HIV, HEPCAB    COVID-19 Screening (If Applicable):   Lab Results   Component Value Date    COVID19 NOT DETECTED 05/04/2021           Anesthesia Evaluation  Patient summary reviewed  Airway:         Dental:          Pulmonary:                              Cardiovascular:    (+) hypertension:,       ECG reviewed                        Neuro/Psych:               GI/Hepatic/Renal:   (+) GERD:,           Endo/Other:    (+) Diabetes, .                   ROS comment: Hemochromatosis Abdominal:           Vascular:                                        Anesthesia Plan      regional and general     ASA 3       Induction: intravenous.                           Sandra Kellogg, APRN - CRNA   5/7/2021

## 2021-05-07 NOTE — ANESTHESIA PRE PROCEDURE
Department of Anesthesiology  Preprocedure Note       Name:  James Cespedes   Age:  63 y.o.  :  1955                                          MRN:  [id number]         Date:  2021      Surgeon: Gustavo Solorzano):  Joann López MD    Procedure: LEFT SHOULDER ARTHROSCOPY, ROTATOR CUFF REPAIR, SUBACROMIAL DECOMPRESSION (Left )    Medications prior to admission:   Prior to Admission medications    Medication Sig Start Date End Date Taking? Authorizing Provider   HYDROcodone-acetaminophen (NORCO) 5-325 MG per tablet Take 1 tablet by mouth every 6 hours as needed. 3/29/21   Historical Provider, MD   blood glucose test strips (ONETOUCH ULTRA) strip CHECK BLOOD GLUCOSE TWICE DAILY 21   Historical Provider, MD   ibuprofen (ADVIL;MOTRIN) 800 MG tablet Take 1 tablet by mouth 3 times daily (with meals) 21   Deandre Jackson PA-C   ONE TOUCH ULTRASOFT LANCETS MISC  20   Historical Provider, MD   pantoprazole (PROTONIX) 40 MG tablet 40 mg daily  21   Historical Provider, MD   gabapentin (NEURONTIN) 300 MG capsule Take 300 mg by mouth 4 times daily. Historical Provider, MD   sitaGLIPtin (JANUVIA) 100 MG tablet Take 100 mg by mouth daily    Historical Provider, MD   carvedilol (COREG) 12.5 MG tablet Take 12.5 mg by mouth 2 times daily (with meals)    Historical Provider, MD   metFORMIN (GLUCOPHAGE) 1000 MG tablet Take 1,000 mg by mouth 2 times daily (with meals). Historical Provider, MD   glimepiride (AMARYL) 4 MG tablet Take 4 mg by mouth 2 times daily. Historical Provider, MD   lisinopril (PRINIVIL;ZESTRIL) 20 MG tablet Take 20 mg by mouth daily. Historical Provider, MD       Current medications:    Current Outpatient Medications   Medication Sig Dispense Refill    HYDROcodone-acetaminophen (NORCO) 5-325 MG per tablet Take 1 tablet by mouth every 6 hours as needed.       blood glucose test strips (ONETOUCH ULTRA) strip CHECK BLOOD GLUCOSE TWICE DAILY      ibuprofen (ADVIL;MOTRIN) 800 MG tablet Take 1 tablet by mouth 3 times daily (with meals) 90 tablet 0    ONE TOUCH ULTRASOFT LANCETS MISC       pantoprazole (PROTONIX) 40 MG tablet 40 mg daily       gabapentin (NEURONTIN) 300 MG capsule Take 300 mg by mouth 4 times daily.  sitaGLIPtin (JANUVIA) 100 MG tablet Take 100 mg by mouth daily      carvedilol (COREG) 12.5 MG tablet Take 12.5 mg by mouth 2 times daily (with meals)      metFORMIN (GLUCOPHAGE) 1000 MG tablet Take 1,000 mg by mouth 2 times daily (with meals).  glimepiride (AMARYL) 4 MG tablet Take 4 mg by mouth 2 times daily.  lisinopril (PRINIVIL;ZESTRIL) 20 MG tablet Take 20 mg by mouth daily. No current facility-administered medications for this visit. Allergies:  No Known Allergies    Problem List:    Patient Active Problem List   Diagnosis Code    Osteoarthritis, localized, shoulder, left M19.012    Traumatic complete tear of left rotator cuff S46.012A    Impingement syndrome, shoulder, left M75.42       Past Medical History:        Diagnosis Date    Acid reflux     Arthritis     Diabetes mellitus ([facility] [address]     dx since approx 2015\"    Full dentures     Hemochromatosis     \"have to much iron- last time they removed some blood was in Jan 2021\"follow with [facility] Rakel [facility] in [facility] for this    Hypertension     \"on medication since 2016\" follows with PCP    Wears glasses        Past Surgical History:        Procedure Laterality Date    COLONOSCOPY  05/08/2019    normal colonoscopy    COLONOSCOPY N/A 5/8/2019    COLORECTAL CANCER SCREENING, HIGH RISK performed by Juan Mullins MD at [address] [place] [address]         Social History:    Social History     Tobacco Use    Smoking status: Never Smoker    Smokeless tobacco: Never Used   Substance Use Topics    Alcohol use: Not Currently                                Counseling given: Not Answered      Vital Signs (Current): There were no vitals filed for this visit. BP Readings from Last 3 Encounters:   05/04/21 (!) 144/73   05/08/19 111/71   05/08/19 117/71       NPO Status:                                                                                 BMI:   Wt Readings from Last 3 Encounters:   05/04/21 237 lb (107.5 kg)   04/13/21 236 lb (107 kg)   03/05/21 236 lb (107 kg)     There is no height or weight on file to calculate BMI.    CBC:   Lab Results   Component Value Date    WBC 7.1 05/04/2021    RBC 4.09 05/04/2021    HGB 12.4 05/04/2021    HCT 37.3 05/04/2021    MCV 91.2 05/04/2021    RDW 12.4 05/04/2021     05/04/2021       CMP:   Lab Results   Component Value Date     05/04/2021    K 4.9 05/04/2021    CL 98 05/04/2021    CO2 24 05/04/2021    BUN 28 05/04/2021    CREATININE 1.2 05/04/2021    GFRAA >60 05/04/2021    LABGLOM >60 05/04/2021    GLUCOSE 311 05/04/2021    PROT 7.4 09/24/2019    CALCIUM 10.2 05/04/2021    BILITOT 0.2 09/24/2019    ALKPHOS 98 09/24/2019    AST 32 09/24/2019    ALT 37 09/24/2019       POC Tests: No results for input(s): POCGLU, POCNA, POCK, POCCL, POCBUN, POCHEMO, POCHCT in the last 72 hours.     Coags: No results found for: PROTIME, INR, APTT    HCG (If Applicable): No results found for: PREGTESTUR, PREGSERUM, HCG, HCGQUANT     ABGs: No results found for: PHART, PO2ART, DZW8ANI, WCK5DHT, BEART, N2PTTGCD     Type & Screen (If Applicable):  No results found for: LABABO, [address]    Anesthesia Evaluation  Patient summary reviewed  Airway: Mallampati: III        Dental:    (+) upper dentures and lower dentures      Pulmonary: breath sounds clear to auscultation                             Cardiovascular:  Exercise tolerance: good (>4 METS),   (+) hypertension:,         Rhythm: regular             Beta Blocker:  Order written      ROS comment: RBBB     Neuro/Psych:               GI/Hepatic/Renal:   (+) GERD:, morbid obesity          Endo/Other:    (+) DiabetesType I DM, , : arthritis: OA., .                  ROS comment: Insulin given this am    Dr Jenn briceno with hyperglycemia and hyponatremia Abdominal:   (+) obese,         Vascular:                                        Anesthesia Plan      general and regional     ASA 3       Induction: intravenous. MIPS: Postoperative opioids intended. Anesthetic plan and risks discussed with patient. Plan discussed with CRNA. Attending anesthesiologist reviewed and agrees with Pre Eval content          Pre Anesthesia Evaluation complete. Anesthesia plan, risks, benefits, alternatives, and personnel discussed with patient and/or legal guardian. Patient and/or legal guardian verbalized an understanding and agreed to proceed. Anesthesia plan discussed with care team members and agreed upon.   ROSCOE Mayen - CRNA  5/7/2021

## 2021-05-10 ENCOUNTER — HOSPITAL ENCOUNTER (OUTPATIENT)
Age: 66
Setting detail: OUTPATIENT SURGERY
Discharge: HOME | End: 2021-05-10
Attending: ORTHOPAEDIC SURGERY | Admitting: ORTHOPAEDIC SURGERY
Payer: MEDICAID

## 2021-05-10 ENCOUNTER — ANESTHESIA (OUTPATIENT)
Dept: OPERATING ROOM | Age: 66
End: 2021-05-10
Payer: MEDICAID

## 2021-05-10 VITALS
SYSTOLIC BLOOD PRESSURE: 122 MMHG | DIASTOLIC BLOOD PRESSURE: 63 MMHG | HEART RATE: 92 BPM | OXYGEN SATURATION: 92 % | TEMPERATURE: 98 F | HEIGHT: 71 IN | RESPIRATION RATE: 16 BRPM | BODY MASS INDEX: 33.18 KG/M2 | WEIGHT: 237 LBS

## 2021-05-10 VITALS
SYSTOLIC BLOOD PRESSURE: 60 MMHG | DIASTOLIC BLOOD PRESSURE: 53 MMHG | RESPIRATION RATE: 46 BRPM | OXYGEN SATURATION: 98 % | TEMPERATURE: 98.6 F

## 2021-05-10 DIAGNOSIS — S46.012A TRAUMATIC COMPLETE TEAR OF LEFT ROTATOR CUFF, INITIAL ENCOUNTER: ICD-10-CM

## 2021-05-10 DIAGNOSIS — M75.42 IMPINGEMENT SYNDROME OF LEFT SHOULDER: Primary | ICD-10-CM

## 2021-05-10 LAB
EKG ATRIAL RATE: 91 BPM
EKG DIAGNOSIS: NORMAL
EKG P AXIS: 26 DEGREES
EKG P-R INTERVAL: 154 MS
EKG Q-T INTERVAL: 360 MS
EKG QRS DURATION: 116 MS
EKG QTC CALCULATION (BAZETT): 442 MS
EKG R AXIS: -25 DEGREES
EKG T AXIS: -3 DEGREES
EKG VENTRICULAR RATE: 91 BPM
GLUCOSE BLD-MCNC: 189 MG/DL (ref 70–99)
GLUCOSE BLD-MCNC: 236 MG/DL (ref 70–99)
GLUCOSE BLD-MCNC: 293 MG/DL (ref 70–99)

## 2021-05-10 PROCEDURE — 2709999900 HC NON-CHARGEABLE SUPPLY: Performed by: ORTHOPAEDIC SURGERY

## 2021-05-10 PROCEDURE — 86850 RBC ANTIBODY SCREEN: CPT

## 2021-05-10 PROCEDURE — 3700000001 HC ADD 15 MINUTES (ANESTHESIA): Performed by: ORTHOPAEDIC SURGERY

## 2021-05-10 PROCEDURE — 2720000010 HC SURG SUPPLY STERILE: Performed by: ORTHOPAEDIC SURGERY

## 2021-05-10 PROCEDURE — 6360000002 HC RX W HCPCS

## 2021-05-10 PROCEDURE — 3600000014 HC SURGERY LEVEL 4 ADDTL 15MIN: Performed by: ORTHOPAEDIC SURGERY

## 2021-05-10 PROCEDURE — 3700000000 HC ANESTHESIA ATTENDED CARE: Performed by: ORTHOPAEDIC SURGERY

## 2021-05-10 PROCEDURE — 29826 SHO ARTHRS SRG DECOMPRESSION: CPT | Performed by: ORTHOPAEDIC SURGERY

## 2021-05-10 PROCEDURE — 29827 SHO ARTHRS SRG RT8TR CUF RPR: CPT | Performed by: ORTHOPAEDIC SURGERY

## 2021-05-10 PROCEDURE — 6360000002 HC RX W HCPCS: Performed by: NURSE ANESTHETIST, CERTIFIED REGISTERED

## 2021-05-10 PROCEDURE — 3600000004 HC SURGERY LEVEL 4 BASE: Performed by: ORTHOPAEDIC SURGERY

## 2021-05-10 PROCEDURE — 6370000000 HC RX 637 (ALT 250 FOR IP): Performed by: ANESTHESIOLOGY

## 2021-05-10 PROCEDURE — 94150 VITAL CAPACITY TEST: CPT

## 2021-05-10 PROCEDURE — 86901 BLOOD TYPING SEROLOGIC RH(D): CPT

## 2021-05-10 PROCEDURE — L3650 SO 8 ABD RESTRAINT PRE OTS: HCPCS | Performed by: ORTHOPAEDIC SURGERY

## 2021-05-10 PROCEDURE — 2580000003 HC RX 258: Performed by: NURSE ANESTHETIST, CERTIFIED REGISTERED

## 2021-05-10 PROCEDURE — 6360000002 HC RX W HCPCS: Performed by: ANESTHESIOLOGY

## 2021-05-10 PROCEDURE — C1713 ANCHOR/SCREW BN/BN,TIS/BN: HCPCS | Performed by: ORTHOPAEDIC SURGERY

## 2021-05-10 PROCEDURE — 6360000002 HC RX W HCPCS: Performed by: ORTHOPAEDIC SURGERY

## 2021-05-10 PROCEDURE — 93010 ELECTROCARDIOGRAM REPORT: CPT | Performed by: INTERNAL MEDICINE

## 2021-05-10 PROCEDURE — 29826 SHO ARTHRS SRG DECOMPRESSION: CPT | Performed by: PHYSICIAN ASSISTANT

## 2021-05-10 PROCEDURE — 29827 SHO ARTHRS SRG RT8TR CUF RPR: CPT | Performed by: PHYSICIAN ASSISTANT

## 2021-05-10 PROCEDURE — 2500000003 HC RX 250 WO HCPCS: Performed by: NURSE ANESTHETIST, CERTIFIED REGISTERED

## 2021-05-10 PROCEDURE — 93005 ELECTROCARDIOGRAM TRACING: CPT | Performed by: ANESTHESIOLOGY

## 2021-05-10 PROCEDURE — 7100000010 HC PHASE II RECOVERY - FIRST 15 MIN: Performed by: ORTHOPAEDIC SURGERY

## 2021-05-10 PROCEDURE — 64415 NJX AA&/STRD BRCH PLXS IMG: CPT | Performed by: ANESTHESIOLOGY

## 2021-05-10 PROCEDURE — 82962 GLUCOSE BLOOD TEST: CPT

## 2021-05-10 PROCEDURE — 7100000011 HC PHASE II RECOVERY - ADDTL 15 MIN: Performed by: ORTHOPAEDIC SURGERY

## 2021-05-10 PROCEDURE — 7100000000 HC PACU RECOVERY - FIRST 15 MIN: Performed by: ORTHOPAEDIC SURGERY

## 2021-05-10 PROCEDURE — 86900 BLOOD TYPING SEROLOGIC ABO: CPT

## 2021-05-10 PROCEDURE — 7100000001 HC PACU RECOVERY - ADDTL 15 MIN: Performed by: ORTHOPAEDIC SURGERY

## 2021-05-10 DEVICE — ANCHOR SUTURE BIOCOMP 4.75X19.1 MM SWIVELOCK C: Type: IMPLANTABLE DEVICE | Site: SHOULDER | Status: FUNCTIONAL

## 2021-05-10 RX ORDER — PROPOFOL 10 MG/ML
INJECTION, EMULSION INTRAVENOUS PRN
Status: DISCONTINUED | OUTPATIENT
Start: 2021-05-10 | End: 2021-05-10 | Stop reason: SDUPTHER

## 2021-05-10 RX ORDER — OXYCODONE HYDROCHLORIDE AND ACETAMINOPHEN 5; 325 MG/1; MG/1
1 TABLET ORAL EVERY 6 HOURS PRN
Qty: 28 TABLET | Refills: 0 | Status: SHIPPED | OUTPATIENT
Start: 2021-05-10 | End: 2021-05-17

## 2021-05-10 RX ORDER — ROPIVACAINE HYDROCHLORIDE 5 MG/ML
INJECTION, SOLUTION EPIDURAL; INFILTRATION; PERINEURAL
Status: COMPLETED | OUTPATIENT
Start: 2021-05-10 | End: 2021-05-10

## 2021-05-10 RX ORDER — ONDANSETRON 2 MG/ML
4 INJECTION INTRAMUSCULAR; INTRAVENOUS
Status: DISCONTINUED | OUTPATIENT
Start: 2021-05-10 | End: 2021-05-10 | Stop reason: HOSPADM

## 2021-05-10 RX ORDER — FENTANYL CITRATE 50 UG/ML
50 INJECTION, SOLUTION INTRAMUSCULAR; INTRAVENOUS EVERY 5 MIN PRN
Status: DISCONTINUED | OUTPATIENT
Start: 2021-05-10 | End: 2021-05-10 | Stop reason: HOSPADM

## 2021-05-10 RX ORDER — FENTANYL CITRATE 50 UG/ML
25 INJECTION, SOLUTION INTRAMUSCULAR; INTRAVENOUS EVERY 5 MIN PRN
Status: DISCONTINUED | OUTPATIENT
Start: 2021-05-10 | End: 2021-05-10 | Stop reason: HOSPADM

## 2021-05-10 RX ORDER — EPINEPHRINE 1 MG/ML
INJECTION, SOLUTION, CONCENTRATE INTRAVENOUS
Status: COMPLETED | OUTPATIENT
Start: 2021-05-10 | End: 2021-05-10

## 2021-05-10 RX ORDER — SODIUM CHLORIDE 9 MG/ML
INJECTION, SOLUTION INTRAVENOUS CONTINUOUS
Status: DISCONTINUED | OUTPATIENT
Start: 2021-05-10 | End: 2021-05-10 | Stop reason: HOSPADM

## 2021-05-10 RX ORDER — FENTANYL CITRATE 50 UG/ML
INJECTION, SOLUTION INTRAMUSCULAR; INTRAVENOUS
Status: COMPLETED
Start: 2021-05-10 | End: 2021-05-10

## 2021-05-10 RX ORDER — SODIUM CHLORIDE, SODIUM LACTATE, POTASSIUM CHLORIDE, CALCIUM CHLORIDE 600; 310; 30; 20 MG/100ML; MG/100ML; MG/100ML; MG/100ML
INJECTION, SOLUTION INTRAVENOUS CONTINUOUS PRN
Status: DISCONTINUED | OUTPATIENT
Start: 2021-05-10 | End: 2021-05-10 | Stop reason: SDUPTHER

## 2021-05-10 RX ORDER — VASOPRESSIN 20 U/ML
INJECTION PARENTERAL PRN
Status: DISCONTINUED | OUTPATIENT
Start: 2021-05-10 | End: 2021-05-10 | Stop reason: SDUPTHER

## 2021-05-10 RX ORDER — FENTANYL CITRATE 50 UG/ML
INJECTION, SOLUTION INTRAMUSCULAR; INTRAVENOUS PRN
Status: DISCONTINUED | OUTPATIENT
Start: 2021-05-10 | End: 2021-05-10 | Stop reason: SDUPTHER

## 2021-05-10 RX ORDER — LABETALOL HYDROCHLORIDE 5 MG/ML
5 INJECTION, SOLUTION INTRAVENOUS EVERY 10 MIN PRN
Status: DISCONTINUED | OUTPATIENT
Start: 2021-05-10 | End: 2021-05-10 | Stop reason: HOSPADM

## 2021-05-10 RX ORDER — LIDOCAINE HYDROCHLORIDE 20 MG/ML
INJECTION, SOLUTION INTRAVENOUS PRN
Status: DISCONTINUED | OUTPATIENT
Start: 2021-05-10 | End: 2021-05-10 | Stop reason: SDUPTHER

## 2021-05-10 RX ORDER — ROCURONIUM BROMIDE 10 MG/ML
INJECTION, SOLUTION INTRAVENOUS PRN
Status: DISCONTINUED | OUTPATIENT
Start: 2021-05-10 | End: 2021-05-10 | Stop reason: SDUPTHER

## 2021-05-10 RX ORDER — GABAPENTIN 300 MG/1
300 CAPSULE ORAL 4 TIMES DAILY
COMMUNITY

## 2021-05-10 RX ORDER — OXYCODONE HYDROCHLORIDE AND ACETAMINOPHEN 5; 325 MG/1; MG/1
1 TABLET ORAL ONCE
Status: COMPLETED | OUTPATIENT
Start: 2021-05-10 | End: 2021-05-10

## 2021-05-10 RX ORDER — PHENYLEPHRINE HCL IN 0.9% NACL 1 MG/10 ML
SYRINGE (ML) INTRAVENOUS PRN
Status: DISCONTINUED | OUTPATIENT
Start: 2021-05-10 | End: 2021-05-10 | Stop reason: SDUPTHER

## 2021-05-10 RX ORDER — ONDANSETRON 2 MG/ML
INJECTION INTRAMUSCULAR; INTRAVENOUS PRN
Status: DISCONTINUED | OUTPATIENT
Start: 2021-05-10 | End: 2021-05-10 | Stop reason: SDUPTHER

## 2021-05-10 RX ORDER — MIDAZOLAM HYDROCHLORIDE 1 MG/ML
INJECTION INTRAMUSCULAR; INTRAVENOUS PRN
Status: DISCONTINUED | OUTPATIENT
Start: 2021-05-10 | End: 2021-05-10 | Stop reason: SDUPTHER

## 2021-05-10 RX ORDER — HYDRALAZINE HYDROCHLORIDE 20 MG/ML
5 INJECTION INTRAMUSCULAR; INTRAVENOUS EVERY 10 MIN PRN
Status: DISCONTINUED | OUTPATIENT
Start: 2021-05-10 | End: 2021-05-10 | Stop reason: HOSPADM

## 2021-05-10 RX ADMIN — Medication 200 MCG: at 11:38

## 2021-05-10 RX ADMIN — INSULIN HUMAN 15 UNITS: 100 INJECTION, SOLUTION PARENTERAL at 10:45

## 2021-05-10 RX ADMIN — FENTANYL CITRATE 100 MCG: 50 INJECTION, SOLUTION INTRAMUSCULAR; INTRAVENOUS at 11:46

## 2021-05-10 RX ADMIN — VASOPRESSIN 2 UNITS: 20 INJECTION INTRAVENOUS at 12:01

## 2021-05-10 RX ADMIN — FENTANYL CITRATE 50 MCG: 50 INJECTION, SOLUTION INTRAMUSCULAR; INTRAVENOUS at 14:08

## 2021-05-10 RX ADMIN — Medication 200 MCG: at 11:35

## 2021-05-10 RX ADMIN — FENTANYL CITRATE 50 MCG: 50 INJECTION, SOLUTION INTRAMUSCULAR; INTRAVENOUS at 14:20

## 2021-05-10 RX ADMIN — Medication 100 MCG: at 11:26

## 2021-05-10 RX ADMIN — SUGAMMADEX 200 MG: 100 INJECTION, SOLUTION INTRAVENOUS at 13:02

## 2021-05-10 RX ADMIN — CEFAZOLIN SODIUM 2000 MG: 10 INJECTION, POWDER, FOR SOLUTION INTRAVENOUS at 11:00

## 2021-05-10 RX ADMIN — Medication 400 MCG: at 11:54

## 2021-05-10 RX ADMIN — ROPIVACAINE HYDROCHLORIDE 20 ML: 5 INJECTION, SOLUTION EPIDURAL; INFILTRATION; PERINEURAL at 10:50

## 2021-05-10 RX ADMIN — ROCURONIUM BROMIDE 50 MG: 10 INJECTION INTRAVENOUS at 11:00

## 2021-05-10 RX ADMIN — Medication 200 MCG: at 11:51

## 2021-05-10 RX ADMIN — Medication 300 MCG: at 11:41

## 2021-05-10 RX ADMIN — OXYCODONE HYDROCHLORIDE AND ACETAMINOPHEN 1 TABLET: 5; 325 TABLET ORAL at 15:31

## 2021-05-10 RX ADMIN — PROPOFOL 50 MG: 10 INJECTION, EMULSION INTRAVENOUS at 13:10

## 2021-05-10 RX ADMIN — PROPOFOL 200 MG: 10 INJECTION, EMULSION INTRAVENOUS at 11:00

## 2021-05-10 RX ADMIN — SODIUM CHLORIDE, POTASSIUM CHLORIDE, SODIUM LACTATE AND CALCIUM CHLORIDE: 600; 310; 30; 20 INJECTION, SOLUTION INTRAVENOUS at 13:06

## 2021-05-10 RX ADMIN — SODIUM CHLORIDE, POTASSIUM CHLORIDE, SODIUM LACTATE AND CALCIUM CHLORIDE: 600; 310; 30; 20 INJECTION, SOLUTION INTRAVENOUS at 10:58

## 2021-05-10 RX ADMIN — VASOPRESSIN 3 UNITS: 20 INJECTION INTRAVENOUS at 12:07

## 2021-05-10 RX ADMIN — PHENYLEPHRINE HYDROCHLORIDE 150 MCG/MIN: 10 INJECTION INTRAVENOUS at 12:11

## 2021-05-10 RX ADMIN — MIDAZOLAM 2 MG: 1 INJECTION INTRAMUSCULAR; INTRAVENOUS at 10:45

## 2021-05-10 RX ADMIN — LIDOCAINE HYDROCHLORIDE 80 MG: 20 INJECTION, SOLUTION INTRAVENOUS at 11:00

## 2021-05-10 RX ADMIN — ONDANSETRON 4 MG: 2 INJECTION INTRAMUSCULAR; INTRAVENOUS at 13:02

## 2021-05-10 RX ADMIN — INSULIN HUMAN 10 UNITS: 100 INJECTION, SOLUTION PARENTERAL at 12:18

## 2021-05-10 ASSESSMENT — PULMONARY FUNCTION TESTS
PIF_VALUE: 17
PIF_VALUE: 17
PIF_VALUE: 7
PIF_VALUE: 17
PIF_VALUE: 3
PIF_VALUE: 22
PIF_VALUE: 22
PIF_VALUE: 24
PIF_VALUE: 17
PIF_VALUE: 17
PIF_VALUE: 20
PIF_VALUE: 13
PIF_VALUE: 18
PIF_VALUE: 17
PIF_VALUE: 17
PIF_VALUE: 20
PIF_VALUE: 17
PIF_VALUE: 30
PIF_VALUE: 17
PIF_VALUE: 22
PIF_VALUE: 17
PIF_VALUE: 4
PIF_VALUE: 21
PIF_VALUE: 17
PIF_VALUE: 19
PIF_VALUE: 20
PIF_VALUE: 20
PIF_VALUE: 4
PIF_VALUE: 17
PIF_VALUE: 17
PIF_VALUE: 23
PIF_VALUE: 17
PIF_VALUE: 2
PIF_VALUE: 17
PIF_VALUE: 17
PIF_VALUE: 22
PIF_VALUE: 17
PIF_VALUE: 13
PIF_VALUE: 17
PIF_VALUE: 18
PIF_VALUE: 20
PIF_VALUE: 17
PIF_VALUE: 18
PIF_VALUE: 17
PIF_VALUE: 17
PIF_VALUE: 28
PIF_VALUE: 21
PIF_VALUE: 17
PIF_VALUE: 19
PIF_VALUE: 17
PIF_VALUE: 20
PIF_VALUE: 17
PIF_VALUE: 14
PIF_VALUE: 17
PIF_VALUE: 4
PIF_VALUE: 24
PIF_VALUE: 17
PIF_VALUE: 23
PIF_VALUE: 17
PIF_VALUE: 23
PIF_VALUE: 17
PIF_VALUE: 18
PIF_VALUE: 13
PIF_VALUE: 18
PIF_VALUE: 35
PIF_VALUE: 17
PIF_VALUE: 17
PIF_VALUE: 19

## 2021-05-10 ASSESSMENT — PAIN DESCRIPTION - DESCRIPTORS
DESCRIPTORS: ACHING
DESCRIPTORS: SHARP
DESCRIPTORS: ACHING

## 2021-05-10 ASSESSMENT — PAIN DESCRIPTION - PROGRESSION
CLINICAL_PROGRESSION: GRADUALLY IMPROVING
CLINICAL_PROGRESSION: NOT CHANGED

## 2021-05-10 ASSESSMENT — PAIN DESCRIPTION - LOCATION
LOCATION: SHOULDER

## 2021-05-10 ASSESSMENT — PAIN SCALES - GENERAL
PAINLEVEL_OUTOF10: 8
PAINLEVEL_OUTOF10: 10
PAINLEVEL_OUTOF10: 5
PAINLEVEL_OUTOF10: 0

## 2021-05-10 ASSESSMENT — PAIN DESCRIPTION - PAIN TYPE
TYPE: SURGICAL PAIN

## 2021-05-10 ASSESSMENT — PAIN - FUNCTIONAL ASSESSMENT: PAIN_FUNCTIONAL_ASSESSMENT: 0-10

## 2021-05-10 NOTE — PROGRESS NOTES
1200 In to see patient, patient c/o pain 8/10 to L shoulder. Ice still applied to L shoulder One time Percocet given per verbal order. Call light in reach.

## 2021-05-10 NOTE — PROGRESS NOTES
1330- Pt arrived from OR. Pt placed on monitor. Alarms on and verified. Pt asleep and following commands. 1333- Blood sugar 189.  1354- Ice chips given to pt. Tolerated well. Denies nausea. 1400- Repositioned pt with pillow support. 1408- Fentanyl given for pain. 1420- Fentanyl given for pain  1435- PACU Care complete. 1438- Bedside hand off given to [facility] .

## 2021-05-10 NOTE — ANESTHESIA POSTPROCEDURE EVALUATION
Department of Anesthesiology  Postprocedure Note    Patient: Mary Manzano  MRN: [id number]  YOB: 1955  Date of evaluation: 5/10/2021  Time:  1:32 PM     Procedure Summary     Date: 05/10/21 Room / Location: [address]    Anesthesia Start: 8260 Anesthesia Stop: 6601    Procedure: LEFT SHOULDER ARTHROSCOPY, ROTATOR CUFF REPAIR, SUBACROMIAL DECOMPRESSION (Left Shoulder) Diagnosis: (TRAUMATIC COMPLETE TEAR OF LEFT ROTATOR CUFF, IMPINGEMENT SYNDROME LEFT)    Surgeons: Carol Bergeron MD Responsible Provider: Andre Bragg MD    Anesthesia Type: general ASA Status: 3          Anesthesia Type: general    Elsy Phase I:      Elsy Phase II:      Last vitals: Reviewed and per EMR flowsheets.        Anesthesia Post Evaluation    Patient location during evaluation: PACU  Patient participation: complete - patient participated  Level of consciousness: awake and alert  Pain score: 0  Airway patency: patent  Nausea & Vomiting: no vomiting  Complications: no  Cardiovascular status: blood pressure returned to baseline and hemodynamically stable  Respiratory status: acceptable, spontaneous ventilation, nonlabored ventilation and face mask  Hydration status: stable

## 2021-05-10 NOTE — PROGRESS NOTES
0 Pt discharged to home per wheelchair to friends vehicle. Pt ice packs refilled for him to take home.

## 2021-05-10 NOTE — OP NOTE
Operative Note      Patient: Marlys Daley  YOB: 1955  MRN: [id number]    Date of Procedure: 5/10/2021    Pre-Op Diagnosis: TRAUMATIC COMPLETE TEAR OF LEFT ROTATOR CUFF, IMPINGEMENT SYNDROME LEFT    Post-Op Diagnosis: Same       Procedure(s):  LEFT SHOULDER ARTHROSCOPY, ROTATOR CUFF REPAIR, SUBACROMIAL DECOMPRESSION    Surgeon(s):  Florida Duncan MD    Assistant:     Kurt Giron    Anesthesia: General    Estimated Blood Loss (mL): Minimal    Complications: None    Specimens:   * No specimens in log *    Implants:  Implant Name Type Inv. Item Serial No.  Lot No. LRB No. Used Action   ANCHOR SUTURE BIOCOMP 4.75X19.1 MM [employer] C  ANCHOR SUTURE BIOCOMP 4.75X19.1 MM [employer] [id number] Left 1 Implanted   ANCHOR SUTURE BIOCOMP 4.75X19.1 MM [employer]  ANCHOR SUTURE BIOCOMP 4.75X19.1 MM [employer] [id number] Left 1 Implanted         Drains: * No LDAs found *    Findings:     Detailed Description of Procedure: The patient was identified in the preoperative area and the site was marked. A regional nerve block was placed by the anesthesia department for postoperative pain relief. The patient was taken back to the operating room placed supine the table. After induction of general endotracheal anesthesia, the left upper extremity was prepped and draped in sterile fashion. A timeout was performed and preoperative antibiotics were given. Examination under anesthesia revealed full passive range of motion of the shoulder with no instability. Standard posterior portal was established with an 11 blade followed by blunt trocar and arthroscope was inserted into the shoulder joint. An anterior portal was established under direct visualization with a spinal needle followed by an 11 blade and blunt trocar. Arthroscopic probe was inserted in the shoulder and there was evidence of a full-thickness tear of the anterior portion of the supraspinatus tendon.   There is mild fraying and degenerative changes along the proximal biceps tendon and superior anterior labrum. There was no significant or advanced articular cartilage changes at the glenohumeral joint. The arthroscopic shaver was used to perform a debridement allowing the frayed and degenerative labral tissue and along the articular side of the rotator cuff and the limited debridement along the biceps tendon attachment site. The biceps was mostly intact and left in place. The subscapularis was intact. The posterior portal was redirected into the subacromial space. A lateral portal was established under direct visualization. The shaver was used to perform a bursectomy exposing the full-thickness rotator cuff tear of the supra spinatus tendon. The cautery device was used to release the coracoacromial ligament and debride soft tissue at the undersurface of the acromion and acromioclavicular joint. The arthroscopic shaver was used to perform a debridement of the prominent spurring at the undersurface of the distal clavicle    Trochars were placed for the rotator cuff repair. The arthroscopic shaver was used to clear debris of the anterior aspect of greater tuberosity from the rotator cuff tear. The Arthrex 4.75 mm absorbable swivel lock anchor was placed at the central aspect of the tear adjacent to the articular margin of the humeral head with fiber tape in place. The fiber tape was placed through the rotator cuff tear 1 anteriorly and one posteriorly in a horizontal mattress fashion. Next the fiber tape was passed through second 4.75 swivel lock anchor and this was advanced into place laterally at the proximal humerus without tension on the limbs to reduce the rotator cuff tendon into position of the greater tuberosity. Sutures were cut. Shoulder was taken through full range of motion was excellent stability and position of the rotator cuff repair.   Instruments were removed and portals were closed with 3-0 nylon suture. Sterile dressing was applied with Xeroform, 4 x 8's, ABD, and foam tape. He was placed into an UltraSling and extubated taken to PACU in stable condition all sponge needle counts were correct. Next postoperative plan:  Patient be discharged to home with a sling. He will follow-up in the office in 10 to 14 days for suture removal and proceed with physical therapy.     Electronically signed by Kathy Rankin MD on 5/10/2021 at 1:24 PM

## 2021-05-10 NOTE — PROGRESS NOTES
1 Dr. Aixa Rose in to see pt. And review DC plan. Pt. Up to side of bed without difficulty. Assisted pt with getting dressed.

## 2021-05-10 NOTE — H&P
4/13/2021        Chief Complaint   Patient presents with    Shoulder Pain       left shoulder MRI          History of Present Illness:                             Marlys Daley is a 63 y.o. male who presents today for evaluation of his left shoulder pain and weakness. He has had about 3 months of shoulder pain and dysfunction. He thinks his shoulder may been aggravated from some chiropractic treatments. He had a manipulation performed and then developed sharp stabbing pains along the lateral aspect of his shoulder. Pain is worse with overhead reaching and movements. He feels weakness with reaching away from his body or up overhead. Pain is sharp and severe at the superior aspect of the shoulder radiates down the lateral aspect of his left arm. He has been treated with oral anti-inflammatory medications and a steroid injection. His symptoms have not responded to these treatments and he continues to have pain constant throughout the day and worse with lifting pushing pulling activities and also while laying on that side at night. He recently had his MRI is here today for a review the results     Patient presents to the office today for left shoulder pain. Pt states onset of pain about a month ago. Pt states that pain is a 7/10 today. Pt states that he has loss of ROM of the left shoulder. Pt is left hand dominate. Patient states that he did receive a steroid injection on 2/5/21 that did not help with the pain. Pt states that he does use ice and take norco which does not help with the pain. Pt denies any injury or accident to the left shoulder.  Pt states he does see the chiropractor and he believes he may have injured his shoulder being adjusted.                       Medical History  Patient's medications, allergies, past medical, surgical, social and family histories were reviewed and updated as appropriate.     Past Medical History        Past Medical History:   Diagnosis Date    Acid reflux      Diabetes mellitus ([facility] [address]      Hemochromatosis      Hypertension           Past Surgical History         Past Surgical History:   Procedure Laterality Date    COLONOSCOPY   05/08/2019     normal colonoscopy    COLONOSCOPY N/A 5/8/2019     COLORECTAL CANCER SCREENING, HIGH RISK performed by Kath Neff MD at [facility]         Family History         Family History   Problem Relation Age of Onset    Cancer Father      Cancer Brother           Social History   Social History            Socioeconomic History    Marital status:        Spouse name: None    Number of children: None    Years of education: None    Highest education level: None   Occupational History    None   Social Needs    Financial resource strain: None    Food insecurity       Worry: None       Inability: None    Transportation needs       Medical: None       Non-medical: None   Tobacco Use    Smoking status: Never Smoker    Smokeless tobacco: Never Used   Substance and Sexual Activity    Alcohol use: No    Drug use: No    Sexual activity: None   Lifestyle    Physical activity       Days per week: None       Minutes per session: None    Stress: None   Relationships    Social connections       Talks on phone: None       Gets together: None       Attends Anabaptist service: None       Active member of club or organization: None       Attends meetings of clubs or organizations: None       Relationship status: None    Intimate partner violence       Fear of current or ex partner: None       Emotionally abused: None       Physically abused: None       Forced sexual activity: None   Other Topics Concern    None   Social History Narrative    None         Current Facility-Administered Medications          Current Outpatient Medications   Medication Sig Dispense Refill    HYDROcodone-acetaminophen (NORCO) 5-325 MG per tablet Take 1 tablet by mouth every 6 hours as needed.        blood glucose test strips (ONETOUCH ULTRA) strip CHECK BLOOD GLUCOSE TWICE DAILY        Pseudoephedrine-DM-GG 60- MG TABS Take 1 tablet by mouth every 4 hours as needed        ibuprofen (ADVIL;MOTRIN) 800 MG tablet Take 1 tablet by mouth 3 times daily (with meals) 90 tablet 0    cyclobenzaprine (FLEXERIL) 5 MG tablet          ONE TOUCH ULTRASOFT LANCETS MISC          pantoprazole (PROTONIX) 40 MG tablet          gabapentin (NEURONTIN) 300 MG capsule Take 300 mg by mouth 4 times daily.        sitaGLIPtin (JANUVIA) 100 MG tablet Take 100 mg by mouth daily        carvedilol (COREG) 12.5 MG tablet Take 12.5 mg by mouth 2 times daily (with meals)        metFORMIN (GLUCOPHAGE) 1000 MG tablet Take 1,000 mg by mouth 2 times daily (with meals).        glimepiride (AMARYL) 4 MG tablet Take 4 mg by mouth 2 times daily.        lisinopril (PRINIVIL;ZESTRIL) 20 MG tablet Take 20 mg by mouth daily.          No current facility-administered medications for this visit.          No Known Allergies     Review of Systems:   Review of Systems   Constitutional: Negative for chills and fever. HENT: Negative for congestion and sneezing. Eyes: Negative for pain and redness. Respiratory: Negative for chest tightness, shortness of breath and wheezing. Cardiovascular: Negative for chest pain and palpitations. Gastrointestinal: Negative for vomiting. Musculoskeletal: Positive for arthralgias. Skin: Negative for color change and rash. Neurological: Positive for weakness. Negative for numbness. Psychiatric/Behavioral: Negative for agitation. The patient is not nervous/anxious.                                                  Examination:  General Exam:  Vitals: Pulse 84   Resp 15   Ht 5' 10\" (1.778 m)   Wt 236 lb (107 kg)   SpO2 97%   BMI 33.86 kg/m²    Physical Exam  Vitals signs and nursing note reviewed. Constitutional:       Appearance: Normal appearance.    HENT:      Head: Normocephalic and atraumatic. Eyes:      Conjunctiva/sclera: Conjunctivae normal.      Pupils: Pupils are equal, round, and reactive to light. Neck:      Musculoskeletal: Normal range of motion. Pulmonary:      Effort: Pulmonary effort is normal.   Musculoskeletal:      Right shoulder: He exhibits normal range of motion, no tenderness, no bony tenderness, no swelling, no deformity, no laceration, no pain and normal strength. Right elbow: Normal.     Left elbow: Normal. He exhibits normal range of motion, no swelling, no effusion, no deformity and no laceration. No tenderness found. Comments: Left Upper Extremity:    There is moderate to severe tenderness diffusely throughout the shoulder. Tenderness to palpation is maximal over the anterior and lateral aspects of the shoulder specifically along the greater tuberosity and proximal biceps tendon. Active range of motion is limited due to pain. Forward elevation present to 100 degrees, abduction present to 80 degrees, external rotation 15 degrees. Passive range of motion is moderately restricted and limited due to pain and apprehension. Forward elevation 120 degrees, abduction 100 degrees. Rotator cuff testing is difficult due to pain. Strength 4/5 forward elevation and abduction of the shoulder. There is breakaway to strength testing due to pain. Positive empty can test.  Positive drop arm sign. Positive Kauffman and Neer signs. Moderate pain at the acromioclavicular joint with crossarm test.    Skin is intact. Sensation is intact in the upper extremity. 2+ radial pulse. No edema. No muscle atrophy. Skin:     General: Skin is warm and dry. Neurological:      Mental Status: He is alert and oriented to person, place, and time.    Psychiatric:         Mood and Affect: Mood normal.         Behavior: Behavior normal.               Diagnostic testing:  X-ray images were reviewed by myself and discussed with the patient:  Mild degenerative changes of the acromioclavicular joint. Normal position of the glenohumeral joint. No evidence of fracture or acute abnormality. Preserved joint space is present at the glenohumeral joint.     MRI images of the left shoulder were reviewed by myself and discussed with the patient:     MRI of the left shoulder completed on 4/1/21  Impression   Complete full-thickness tear of the supraspinatus tendon with medial tendon   retraction by approximately 1.8 cm.  Moderate tendinosis affecting the torn   supraspinatus tendon fibers.       The tear extends posteriorly to disrupt the articular surface fibers of the   junctional fibers of the supraspinatus and infraspinatus tendons resulting in   mild-to-moderate partial-thickness tear.  Additional mild-to-moderate   partial-thickness insertional tear of the more posterior insertional fibers   of the infraspinatus tendon.       Mild-to-moderate tendinosis affecting the subscapularis tendon.       Mild atrophy of the supraspinatus, infraspinatus and superior fibers of the   subscapularis muscle.       Moderate tendinosis affecting the intra-articular portion of the long head of   biceps tendon.       Moderate osteoarthritis affecting the acromioclavicular joint.       Mild-to-moderate subacromial/subdeltoid bursitis.               Office Procedures:  No orders of the defined types were placed in this encounter.        Assessment and Plan  1. Left shoulder rotator cuff tear     2. Left shoulder impingement syndrome and acromioclavicular joint primary osteoarthritis     I reviewed the findings of the MRI with the patient. We discussed the severity of the injury to the rotator cuff. Given the patient's symptoms and the findings on the MRI I have recommended that we proceed with shoulder arthroscopy for rotator cuff repair and subacromial decompression.   Discussed the prominence of the degenerative change at the acromioclavicular joint with inferior spurring and mass-effect on the rotator cuff.  I recommend that we perform arthroscopic distal clavicle excision at the time of the rotator cuff repair to address his symptoms of arthritis as well as the impingement of his rotator cuff     We discussed the risks and benefits of surgery. We discussed the postoperative course and need for initial immobilization followed by subsequent rehabilitation and physical therapy.     We discussed the potential risks with surgery including possible incomplete repair or rerupture after successful repair. We discussed the potential for residual pain, weakness, or stiffness at the shoulder despite appropriate surgical intervention. We discussed the goals of surgery to restore range of motion and strength which may take months to return.     The patient understands and would like to proceed with shoulder arthroscopy for rotator cuff repair, distal clavicle excision, and subacromial decompression.     The patient was counseled at length about the risks of reilly Covid-19 during their perioperative period and any recovery window from their procedure.  The patient was made aware that reilly Covid-19  may worsen their prognosis for recovering from their procedure  and lend to a higher morbidity and/or mortality risk.  All material risks, benefits, and reasonable alternatives including postponing the procedure were discussed. The patient does wish to proceed with the procedure at this time.           History and Physical exam note from the office visit is copied above from epic. I have reviewed the note and examined the patient and find no relevant changes.      I have reviewed with the patient and/or family the risks, benefits, and alternatives to the procedure as well as expected postoperative course    Sangeeta Mcclure MD

## 2021-05-10 NOTE — PROGRESS NOTES
[address] [place] Blood glucose 293 and he stated that he will get with DR. Cris Frank and he will get back with me.

## 2021-05-20 ENCOUNTER — OFFICE VISIT (OUTPATIENT)
Dept: ORTHOPEDIC SURGERY | Age: 66
End: 2021-05-20

## 2021-05-20 VITALS
OXYGEN SATURATION: 96 % | HEIGHT: 70 IN | BODY MASS INDEX: 33.79 KG/M2 | HEART RATE: 81 BPM | WEIGHT: 236 LBS | RESPIRATION RATE: 16 BRPM

## 2021-05-20 DIAGNOSIS — Z98.890 S/P ARTHROSCOPY OF LEFT SHOULDER: Primary | ICD-10-CM

## 2021-05-20 PROCEDURE — 99024 POSTOP FOLLOW-UP VISIT: CPT | Performed by: ORTHOPAEDIC SURGERY

## 2021-05-20 RX ORDER — OXYCODONE HYDROCHLORIDE AND ACETAMINOPHEN 5; 325 MG/1; MG/1
1 TABLET ORAL EVERY 6 HOURS PRN
Qty: 28 TABLET | Refills: 0 | Status: SHIPPED | OUTPATIENT
Start: 2021-05-20 | End: 2021-05-27

## 2021-05-20 NOTE — PATIENT INSTRUCTIONS
Sutures removed  May continue to take Tylenol as needed for pain  PT ordered  Work on ROM and stretching exercises per PT  Rest, ice, and elevate as needed  Follow up in 4 weeks

## 2021-05-20 NOTE — PROGRESS NOTES
Patient returns to the office 10 post operatively for a follow up on his left shoulder arthroscopy that was performed on 5/10/21. Pain is rated at a 4/10 with patient denying SOB, chest, or calf muscle pain . Sutures remain dry, clean, and intact with no signs of infection. He has remained in sling with completion of post operative pain medications and using the OTC Tylenol as needed.

## 2021-05-20 NOTE — PROGRESS NOTES
5/20/2021   Chief Complaint   Patient presents with    Post-Op Check     s/p left shoulder scope DOS 5/10/21        History of Present Illness:                             Naomie Guy is a 63 y.o. male who returns today for follow-up of his left shoulder rotator cuff repair. He has been doing reasonably well and continue to keep his arm protected in the sling. He has been dealing with some mild swelling in his lower extremities but this is getting better day by day. He still has significant pain in his shoulder and is having difficult time sleeping. He would like to have refilled his pain medication today. Patient returns to the office 10 post operatively for a follow up on his left shoulder arthroscopy that was performed on 5/10/21. Pain is rated at a 4/10 with patient denying SOB, chest, or calf muscle pain . Sutures remain dry, clean, and intact with no signs of infection. He has remained in sling with completion of post operative pain medications and using the OTC Tylenol as needed. Medical History  Patient's medications, allergies, past medical, surgical, social and family histories were reviewed and updated as appropriate. Review of Systems:   Review of Systems                                            Examination:  General Exam:  Vitals: Pulse 81   Resp 16   Ht 5' 10\" (1.778 m)   Wt 236 lb (107 kg)   SpO2 96%   BMI 33.86 kg/m²    Physical Exam     Left upper extremity:  Well-healed arthroscopic incisions. Resolving ecchymosis present diffusely throughout the shoulder. Mild soft tissue swelling throughout the shoulder with moderate tenderness to palpation diffusely. No pain with gentle passive range of motion of shoulder with the arm at the side. Forward elevation passively present to 90 degrees and abduction present to 60 degrees. Sensation and motor function is intact distally with no tenderness at the wrist or elbow.           Office Procedures:  Orders Placed This Encounter Procedures   [facility]     Referral Priority:   Routine     Referral Type:   Eval and Treat     Referral Reason:   Specialty Services Required     Requested Specialty:   Physical Therapy     Number of Visits Requested:   1       Assessment and Plan  1. Left shoulder rotator cuff repair and subacromial decompression    I reassured the patient it appears that his shoulder is healing well. I recommended we proceed with physical therapy to help advancement of his range of motion activities. He should continue to use his sling and then gradually wean out of it over the next 2 to 4 weeks. Continue to avoid lifting pushing and pulling activities. He can do gentle pendulum and passive range of motion as tolerated. I will give him a refill of his pain medication today and ordered physical therapy.     Follow-up in 4 weeks        Electronically signed by Arlene Frye MD on 5/20/2021 at 1:00 PM

## 2021-05-25 ENCOUNTER — TELEPHONE (OUTPATIENT)
Dept: ORTHOPEDIC SURGERY | Age: 66
End: 2021-05-25

## 2021-05-25 NOTE — TELEPHONE ENCOUNTER
I called [facility] at [phone] and spoke with Sanford Zamora.     I requested a prior auth for CPT 28880 for Dx Code Z98.890  [facility] location Tax ID [id number]   Ordered by Katie Ko     Effective 5/27/21 for 90 days  Pending Auth # [id number]

## 2021-05-28 ENCOUNTER — HOSPITAL ENCOUNTER (OUTPATIENT)
Dept: PHYSICAL THERAPY | Age: 66
Setting detail: THERAPIES SERIES
Discharge: HOME | End: 2021-05-28
Payer: MEDICAID

## 2021-05-28 PROCEDURE — 97162 PT EVAL MOD COMPLEX 30 MIN: CPT

## 2021-05-28 NOTE — PROGRESS NOTES
[facility]  [address] [place] [address]  Phone: [phone]  Fax: [phone]      PHYSICAL THERAPY INITIAL ASSESSMENT      Date: 2021  Patient Name: Adin Stephens   : 1955  Referred by: Dr Gianna Castro MD  Reason for Referral: M51.904 s/p arthroscopy of L shoulder. PT Impression: M62.81 muscle weakness,   Insurance: [employer]  Restrictions/Precautions: rotator cuff repair, no active ROM until 6 weeks post op      Subjective   Chart Reviewed: Yes   Patient assessed for rehabilitation services?: Yes   Family / Caregiver Present: No  Follows Commands: Within Functional Limits   Date of onset:  A few months. Pt reports he went to the chiropractor and then had shoulder pain. He reports he had surgery on 5/10/21. Subjective: Pt reports he has been compliant with wearing his sling. Pt reports the first 2 weeks after surgery were really hard and painful and the last week has been better. Current Situation:Pt  reports he has been sleeping in a recliner. Observation: Pt arrived with sling on L UE. Medication: updated in EMR    Pain Screening   Patient Currently in Pain:   Pain Assessment: 0-10   Pain Level: 3/10    Worst pain: 8/10   Best pain:   3/10   Sensation: unimpaired. Per pt report    Vision/Hearing   Vision: impaired. 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 Hobbies: pt reports he piddles and does odd things. Prior level of function:  Pt reports he is not able to do his [employer] trustee duties now. Patient reports hardest things at home: 1) not being able to move arm. Patient goals: to get better.      Orientation: WNL    Objective  PROM on L  AROM on R  Shoulder:   Right Left    Flexion      130  degrees      90  degrees    ABduction      150 2: Pt will demonstrate AROM L shoulder flexion to at least 110 degrees in order to improve ROM. Short term goal 3: Pt will demonstrate AROM L shoulder abduction to at least 100 degrees in order to improve ROM. Short term goal 4: Pt will demonstrate AROM L UE supination to at least 80 degrees in order to improve ROM. Short term goal 5: Pt will demonstrate L  strength to at least 75 lbs in order to improve strength. Short term goal 6: Pt will demonstrate a score of no more than 45 on the Quick DASH in order to improve quality of life. Long term goals to be achieved by August 20, 2021:   Long term goal 1: Pt will demonstrate I with current HEP as prescribed in order to increase ROM and strength. Long term goal 2: Pt will demonstrate AROM L shoulder flexion and abd to at least 120 degrees in order to improve ROM. Long term goal 3: Pt will demonstrate AROM L elbow extension to 0 degrees in order to improve ROM. Long term goal 4: Pt will demonstrate L UE strength at least 4/5 in order to improve strength. Long term goal 5: Pt will demonstrate a score of no more than 30 on the Quick DASH in order to improve quality of life. Note: Goals, frequency, plan, and recommendations will be updated as needed. Goals and treatment plan discussed with family and mutually agreed upon. YES   Will discharge patient when therapy goals have been met or when therapy is no longer deemed necessary. This plan was reviewed with the patient/family and they were in agreement. Electronically signed by:  Rosie Diaz PT,DPT [id number] Date: 5/28/2021, Time: 6:61 AM      I certify that the above patient is under my care and requires the above skilled services. These professional services are to be provided from an established plan, reviewed by me at least every 90 days. These services are related to the diagnosis stated above and are medically necessary.     Date last seen by physician:_________________________________________________    Physician Signature:____________________________________________Date:_______________

## 2021-05-28 NOTE — FLOWSHEET NOTE
Outpatient Physical Therapy  [place]           [x] Phone: [phone]   Fax: [phone]  Korina Noon[place]           [] Phone: [phone]   Fax: [phone]        Physical Therapy Daily Treatment Note  Date:  2021    Patient Name:  Mp Salcedo    :  1955  MRN: [id number]  Restrictions/Precautions:   rotator cuff repair, no active ROM until 6 weeks post op    Diagnosis:     Z98.890 s/p arthroscopy of L shoulder. Date of Injury/Surgery: surgery: 5/10/21  Treatment Diagnosis:     M62.81 muscle weakness,   Insurance/Certification information:   [employer]  Referring Physician:   Dr Siri Roca MD  Plan of care signed (Y/N):  elsa monzonxjustino  Outcome Measure: Quick DASH: 56.82  Visit# / total visits:   1/  Pain level: 3/10   Goals:       Short term goals to be achieved by 2021:  Short term goal 1: Pt will report compliance with current HEP as prescribed in order to improve ROM and strength. Short term goal 2: Pt will demonstrate AROM L shoulder flexion to at least 110 degrees in order to improve ROM. Short term goal 3: Pt will demonstrate AROM L shoulder abduction to at least 100 degrees in order to improve ROM. Short term goal 4: Pt will demonstrate AROM L UE supination to at least 80 degrees in order to improve ROM. Short term goal 5: Pt will demonstrate L  strength to at least 75 lbs in order to improve strength. Short term goal 6: Pt will demonstrate a score of no more than 45 on the Quick DASH in order to improve quality of life. Subjective:  See elsa     Any changes in Ambulatory Summary Sheet?   None    Objective:  See eval     COVID screening questions were asked and patient attested that there had been no contact or symptoms    Exercises: (No more than 4 columns)   Exercise/Equipment Date: 21 Date Date           WARM UP                     TABLE      PROM stretches  L shoulder flex and abd                                STANDING Pendulum forward and back 1x15     Pendulum side to side 1x15     Pendulum circles 1x15 CW and CCW                                  PROPRIOCEPTION                                    MODALITIES                    Other Therapeutic Activities/Education:  Pt educated on PT findings, plan, prognosis, restrictions, and HEP. Pt also educated on care of putty and to use putty with sling off so it does not get on the sling. Home Exercise Program:  Above pendulum exercises as well as putty exercises. Handout and orange putty provided. Manual Treatments:  none    Modalities:  none    Communication with other providers:  eval faxed.      Assessment:  (Response towards treatment session) (Pain Rating) 3/10  Pt is a pleasant 64 yo male who would benefit from skilled PT to address decreased ROM, strength, endurance, functional mobility, and increased pain.      Plan for Next Session:  Continue per POC    Time In / Time Out:    9:40-10:22 am    Timed Code/Total Treatment Minutes:  42 mins    Next Progress Note due:  7/9/21    Plan of Care Interventions:  [x] Therapeutic Exercise  [] Modalities:  [x] Therapeutic Activity     [] Ultrasound  [] Estim  [] Gait Training      [] Cervical Traction [] Lumbar Traction  [] Neuromuscular Re-education    [] Cold/hotpack [] Iontophoresis   [x] Instruction in HEP      [] Vasopneumatic   [] Dry Needling    [] Manual Therapy               [] Aquatic Therapy              Electronically signed by:  Anna Marie Lake PT,DPT [id number]  5/28/2021, 6:23 PM

## 2021-06-08 ENCOUNTER — HOSPITAL ENCOUNTER (OUTPATIENT)
Dept: PHYSICAL THERAPY | Age: 66
Setting detail: THERAPIES SERIES
Discharge: HOME | End: 2021-06-08
Payer: MEDICAID

## 2021-06-08 PROCEDURE — 97110 THERAPEUTIC EXERCISES: CPT

## 2021-06-08 PROCEDURE — 97016 VASOPNEUMATIC DEVICE THERAPY: CPT

## 2021-06-08 PROCEDURE — 97140 MANUAL THERAPY 1/> REGIONS: CPT

## 2021-06-08 NOTE — FLOWSHEET NOTE
Insurance approved 24 units for CPT codes [id number], [id number], [facility], [id number] with date range 6/1/2021 thru 08/30/2021.   Ref # [id number]
WARM UP                     TABLE      Elbow flex/ext  15x    Putty Squeezes  Yellow 3 min     Wrist AROM  15x ea     Digiflex  Black 30x             STANDING      Pendulum forward and back 1x15 15x    Pendulum side to side 1x15 15x    Pendulum circles 1x15 CW and CCW 15x cw/ccw                                 PROPRIOCEPTION                                    MODALITIES                    Other Therapeutic Activities/Education:  Pt educated on PT findings, plan, prognosis, restrictions, and HEP. Pt also educated on care of putty and to use putty with sling off so it does not get on the sling. Home Exercise Program:  Above pendulum exercises as well as putty exercises. Handout and orange putty provided. Manual Treatments: PROM left shoulder flex, scaption, ER, IR, wrist, elbow and forearm    Modalities:  Vaso to left shoulder x 15 min seated in chair with left arm propped on pillow. No negative skin reaction noted at the end of treatment. Communication with other providers:  eval faxed. Assessment:  (Response towards treatment session) (Pain Rating) 0/10.   Continues to demonstrate decreased forearm supination  Pt is a pleasant 62 yo male who would benefit from skilled PT to address decreased ROM, strength, endurance, functional mobility, and increased pain.      Plan for Next Session:  Continue per POC    Time In / Time Out:  1534/1631    Timed Code/Total Treatment Minutes:  62' (15' Vaso, 15' MT, 32' TE)  Next Progress Note due:  7/9/21    Plan of Care Interventions:  [x] Therapeutic Exercise  [] Modalities:  [x] Therapeutic Activity     [] Ultrasound  [] Estim  [] Gait Training      [] Cervical Traction [] Lumbar Traction  [] Neuromuscular Re-education    [] Cold/hotpack [] Iontophoresis   [x] Instruction in HEP      [] Vasopneumatic   [] Dry Needling    [] Manual Therapy               [] Aquatic Therapy              Electronically signed by:  Denys Gunter PTA  6/8/2021, 3:35 PM

## 2021-06-10 ENCOUNTER — HOSPITAL ENCOUNTER (OUTPATIENT)
Dept: PHYSICAL THERAPY | Age: 66
Setting detail: THERAPIES SERIES
Discharge: HOME | End: 2021-06-10
Payer: MEDICAID

## 2021-06-10 PROCEDURE — 97016 VASOPNEUMATIC DEVICE THERAPY: CPT

## 2021-06-10 PROCEDURE — 97110 THERAPEUTIC EXERCISES: CPT

## 2021-06-10 NOTE — FLOWSHEET NOTE
10 x L elbow    Putty Squeezes  Yellow 3 min     Wrist AROM  15x ea  15 x ea    Digiflex  Black 30x 2x 1 min L hand       Clips    11 Black clips 2x on/off                       STANDING      Pendulum forward and back 1x15 15x 15x   Pendulum side to side 1x15 15x 15x   Pendulum circles 1x15 CW and CCW 15x cw/ccw 15x                                PROPRIOCEPTION                                    MODALITIES      Vaso   x10'            Other Therapeutic Activities/Education:  Pt educated on avoiding AROM in L shoulder. Home Exercise Program:  Nothing new provided. Pt to continue curretn HEP    Manual Treatments: PROM left shoulder flex, scaption. Modalities:  Vaso to left shoulder x 10 min seated in chair with left arm propped on pillow. No negative skin reaction noted at the end of treatment. Communication with other providers:  elsa howard. Assessment:  (Response towards treatment session) (Pain Rating) 2/10.   Continues to demonstrate decreased forearm supination  Pt is a pleasant 64 yo male who would benefit from skilled PT to address decreased ROM, strength, endurance, functional mobility, and increased pain.      Plan for Next Session:  Continue per POC    Time In / Time Out: 3:09-3:50 pm    Timed Code/Total Treatment Minutes:  39' (1 vaso, 2 therapeutic exercise)    Next Progress Note due:  7/9/21    Plan of Care Interventions:  [x] Therapeutic Exercise  [] Modalities:  [x] Therapeutic Activity     [] Ultrasound  [] Estim  [] Gait Training      [] Cervical Traction [] Lumbar Traction  [] Neuromuscular Re-education    [] Cold/hotpack [] Iontophoresis   [x] Instruction in HEP      [] Vasopneumatic   [] Dry Needling    [] Manual Therapy               [] Aquatic Therapy              Electronically signed by:  Jennifer Matthew PT, DPT [id number]   6/10/2021, 3:06 PM

## 2021-06-14 ENCOUNTER — HOSPITAL ENCOUNTER (OUTPATIENT)
Dept: PHYSICAL THERAPY | Age: 66
Setting detail: THERAPIES SERIES
Discharge: HOME | End: 2021-06-14
Payer: MEDICAID

## 2021-06-14 PROCEDURE — 97110 THERAPEUTIC EXERCISES: CPT

## 2021-06-14 NOTE — FLOWSHEET NOTE
Outpatient Physical Therapy  [place]           [x] Phone: [phone]   Fax: [phone]  [place]           [] Phone: [phone]   Fax: [phone]        Physical Therapy Daily Treatment Note  Date:  2021    Patient Name:  Adin Stephens    :  1955  MRN: [id number]  Restrictions/Precautions:   rotator cuff repair, no active ROM until 6 weeks post op    Diagnosis:     Z98.890 s/p arthroscopy of L shoulder. Date of Injury/Surgery: surgery: 5/10/21  Treatment Diagnosis:     M62.81 muscle weakness,   Insurance/Certification information:   [employer]  Referring Physician:   Dr Gianna Castro MD  Plan of care signed (Y/N):  eval faxed  Outcome Measure: Quick DASH: 56.82  Visit# / total visits:   4/  Pain level: 3/10   Goals:       Short term goals to be achieved by 2021:  Short term goal 1: Pt will report compliance with current HEP as prescribed in order to improve ROM and strength. Short term goal 2: Pt will demonstrate AROM L shoulder flexion to at least 110 degrees in order to improve ROM. Short term goal 3: Pt will demonstrate AROM L shoulder abduction to at least 100 degrees in order to improve ROM. Short term goal 4: Pt will demonstrate AROM L UE supination to at least 80 degrees in order to improve ROM. Short term goal 5: Pt will demonstrate L  strength to at least 75 lbs in order to improve strength. Short term goal 6: Pt will demonstrate a score of no more than 45 on the Quick DASH in order to improve quality of life. Subjective:   Rates his shoulder pain 3/10. Pt states he is having more pain today than usual. Pt normally lays in recliner, but slept in his bed for the first time in a while last night, which has led to pain when he tried it before. Any changes in Ambulatory Summary Sheet?   None    Objective: Pain at 90* shoulder flexion and scaption PROM  COVID screening questions were asked and patient attested that there had been no Blanca Boggs, [facility] [address]   6/14/2021, 4:07 PM

## 2021-06-16 ENCOUNTER — HOSPITAL ENCOUNTER (OUTPATIENT)
Dept: PHYSICAL THERAPY | Age: 66
Setting detail: THERAPIES SERIES
Discharge: HOME | End: 2021-06-16
Payer: MEDICAID

## 2021-06-16 PROCEDURE — 97016 VASOPNEUMATIC DEVICE THERAPY: CPT

## 2021-06-16 PROCEDURE — 97140 MANUAL THERAPY 1/> REGIONS: CPT

## 2021-06-16 PROCEDURE — 97110 THERAPEUTIC EXERCISES: CPT

## 2021-06-16 NOTE — FLOWSHEET NOTE
Outpatient Physical Therapy  [place]           [x] Phone: [phone]   Fax: [phone]  [place]           [] Phone: [phone]   Fax: [phone]        Physical Therapy Daily Treatment Note  Date:  2021    Patient Name:  Naomie Guy    :  1955  MRN: [id number]  Restrictions/Precautions:   rotator cuff repair, no active ROM until 6 weeks post op    Diagnosis:     Z98.890 s/p arthroscopy of L shoulder. Date of Injury/Surgery: surgery: 5/10/21  Treatment Diagnosis:     M62.81 muscle weakness,   Insurance/Certification information:   [employer]  Referring Physician:   Dr Ashlee Kothari MD  Plan of care signed (Y/N):  eval faxed  Outcome Measure: Quick DASH: 56.82  Visit# / total visits:  5/  Pain level: 2/10   Goals:       Short term goals to be achieved by 2021:  Short term goal 1: Pt will report compliance with current HEP as prescribed in order to improve ROM and strength. Short term goal 2: Pt will demonstrate AROM L shoulder flexion to at least 110 degrees in order to improve ROM. Short term goal 3: Pt will demonstrate AROM L shoulder abduction to at least 100 degrees in order to improve ROM. Short term goal 4: Pt will demonstrate AROM L UE supination to at least 80 degrees in order to improve ROM. Short term goal 5: Pt will demonstrate L  strength to at least 75 lbs in order to improve strength. Short term goal 6: Pt will demonstrate a score of no more than 45 on the Quick DASH in order to improve quality of life. Subjective:   Patient appears in sling with ABD pillow in place and reports of 2/10 pain. Any changes in Ambulatory Summary Sheet?   None    Objective:  PROM Flex 100*  COVID screening questions were asked and patient attested that there had been no contact or symptoms    Exercises: (No more than 4 columns)   Exercise/Equipment Date  6/10/21 Date:   6/10/21 2021           WARM UP                     TABLE

## 2021-06-17 ENCOUNTER — OFFICE VISIT (OUTPATIENT)
Dept: ORTHOPEDIC SURGERY | Age: 66
End: 2021-06-17

## 2021-06-17 VITALS
OXYGEN SATURATION: 97 % | HEART RATE: 100 BPM | RESPIRATION RATE: 16 BRPM | HEIGHT: 70 IN | WEIGHT: 236 LBS | BODY MASS INDEX: 33.79 KG/M2

## 2021-06-17 DIAGNOSIS — Z09 POSTOPERATIVE EXAMINATION: ICD-10-CM

## 2021-06-17 DIAGNOSIS — Z98.890 STATUS POST LEFT ROTATOR CUFF REPAIR: Primary | ICD-10-CM

## 2021-06-17 PROBLEM — M19.012 OSTEOARTHRITIS, LOCALIZED, SHOULDER, LEFT: Status: RESOLVED | Noted: 2021-04-18 | Resolved: 2021-06-17

## 2021-06-17 PROBLEM — S46.012A TRAUMATIC COMPLETE TEAR OF LEFT ROTATOR CUFF: Status: RESOLVED | Noted: 2021-04-18 | Resolved: 2021-06-17

## 2021-06-17 PROBLEM — M75.42 IMPINGEMENT SYNDROME, SHOULDER, LEFT: Status: RESOLVED | Noted: 2021-04-18 | Resolved: 2021-06-17

## 2021-06-17 PROCEDURE — 99024 POSTOP FOLLOW-UP VISIT: CPT | Performed by: ORTHOPAEDIC SURGERY

## 2021-06-17 NOTE — PROGRESS NOTES
6/17/2021   Chief Complaint   Patient presents with    Post-Op Check     left shoulder arthroscopy DOS 5/10/21        History of Present Illness:                             Lenny Ramos is a 63 y.o. male who returns today for follow-up of the left shoulder rotator cuff repair. He has been making good progress with therapy. Has been doing range of motion exercises feels that his shoulder is improving. He has been continue to use his sling for protection. No complaints today. Patient returns to the office for a 5 week post operative follow up on his left shoulder arthroscopy that was performed on 5/10/21 with pain rated at a 2/10. He continues to work with therapy on ROM and strengthening exercises and has begun to transition out of the sling while at home with continued use of sling when outside of the home. He has completed course of Ibuprofen 800 and wishes to discuss continuation of diuretic. Patient reports having a sporadic episode of stabbing sensation along the posterior aspect of the shoulder on the drive to appointment with symptoms subsided. Overall, patient is progressing well. Medical History  Patient's medications, allergies, past medical, surgical, social and family histories were reviewed and updated as appropriate. Review of Systems                                            Examination:  General Exam:  Vitals: Pulse 100   Resp 16   Ht 5' 10\" (1.778 m)   Wt 236 lb (107 kg)   SpO2 97%   BMI 33.86 kg/m²    Physical Exam       Left upper extremity:  Well-healed arthroscopic incisions. Improved mild tenderness diffusely throughout the shoulder joint and lateral upper arm. Improved swelling at the shoulder and upper arm region. No pain with gentle pendulum motions of the arm and shoulder. Passive range of motion is present with forward elevation of 120 degrees and abduction of 90 degrees.   Sensation and motor function is intact throughout the upper extremity with no swelling in the wrist or hand. Office Procedures:  No orders of the defined types were placed in this encounter. Assessment and Plan  1. Left shoulder arthroscopic rotator cuff repair and subacromial decompression    I reassured the patient that he is healing appropriately well and I have recommended that we continue advancement of his rehabilitation activities. He can discontinue use of his sling and use it intermittently as needed for comfort. I have encouraged him to continue working on range of motion stretching exercises as a home program and continue to make advancements with physical therapy to progress to strengthening activities and active range of motion. I would like him to follow-up here in 6 weeks for check of his progress.   Continue to avoid painful and strenuous and sudden movements of the shoulder        Electronically signed by mW Corona MD on 6/17/2021 at 1:33 PM

## 2021-06-21 ENCOUNTER — HOSPITAL ENCOUNTER (OUTPATIENT)
Dept: PHYSICAL THERAPY | Age: 66
Setting detail: THERAPIES SERIES
Discharge: HOME | End: 2021-06-21
Payer: MEDICAID

## 2021-06-21 PROCEDURE — 97110 THERAPEUTIC EXERCISES: CPT

## 2021-06-21 PROCEDURE — 97016 VASOPNEUMATIC DEVICE THERAPY: CPT

## 2021-06-21 PROCEDURE — 97140 MANUAL THERAPY 1/> REGIONS: CPT

## 2021-06-21 NOTE — FLOWSHEET NOTE
Outpatient Physical Therapy  [place]           [x] Phone: [phone]   Fax: [phone]  [place]           [] Phone: [phone]   Fax: [phone]        Physical Therapy Daily Treatment Note  Date:  2021    Patient Name:  Adin Stephens    :  1955  MRN: [id number]  Restrictions/Precautions:   rotator cuff repair, no active ROM until 6 weeks post op -6 weeks as of 21   Diagnosis:     Z98.890 s/p arthroscopy of L shoulder. Date of Injury/Surgery: surgery: 5/10/21  Treatment Diagnosis:     M62.81 muscle weakness,   Insurance/Certification information:   [employer]  Referring Physician:   Dr Gianna Castro MD  Plan of care signed (Y/N):  elsa faxed  Outcome Measure: Quick DASH: 56.82  Visit# / total visits:  5/  Pain level: 2/10   Goals:       Short term goals to be achieved by 2021:  Short term goal 1: Pt will report compliance with current HEP as prescribed in order to improve ROM and strength. Short term goal 2: Pt will demonstrate AROM L shoulder flexion to at least 110 degrees in order to improve ROM. Short term goal 3: Pt will demonstrate AROM L shoulder abduction to at least 100 degrees in order to improve ROM. Short term goal 4: Pt will demonstrate AROM L UE supination to at least 80 degrees in order to improve ROM. Short term goal 5: Pt will demonstrate L  strength to at least 75 lbs in order to improve strength. Short term goal 6: Pt will demonstrate a score of no more than 45 on the Quick DASH in order to improve quality of life. Subjective:   Patient reports weaning from sling since seeing Dr. Hansa Velez 21    Any changes in Ambulatory Summary Sheet?   None    Objective:  PROM Flex 120*  COVID screening questions were asked and patient attested that there had been no contact or symptoms    Exercises: (No more than 4 columns)   Exercise/Equipment Date  6/10/21 Date:   6/10/21 2021 6/21/21            WARM UP TABLE       Elbow flex/ext 10 x L elbow  10 x L elbow  15x L 20 reps L only   Wrist AROM 15 x ea  Wrist AROM  15x ea  15x CW/CCW   Seated shrugs   2x10 --   Scap ret   2x10 2x10   Supine punch  AROM    1 x10   Supine FLEX AROM    Arm @ side to 90* 2 x10   Supine shoulder circles    Shoulder @ 90* FLEX x15 CCW/CCW                               STANDING       Pendulum forward and back 15x 15x 15x 15x   Pendulum side to side 15x 15x 15x 15x   Pendulum circles 15x 15x CW/CCW 15x CW/CCW 15x CW/CCW                                    PROPRIOCEPTION                                          MODALITIES       Vaso x10'   15' 15'            Other Therapeutic Activities/Education:  Pt educated on avoiding AROM in L shoulder and continuing HEP. Home Exercise Program:  Nothing new provided. Pt to continue curretnt HEP. Manual Treatments: PROM left shoulder flex, scaption. Modalities:  Vaso to left shld 15' without adverse reaction     Communication with other providers:       Assessment:  (Response towards treatment session) (Pain Rating) Following treatment pt reported 1/10 pain.     Pt is a pleasant 62 yo male who would benefit from skilled PT to address decreased ROM, strength, endurance, functional mobility, and increased pain.      Plan for Next Session:  Continue per POC    Time In / Time Out:  7867/3029  Timed Code/Total Treatment Minutes: 61  15vaso 8man 26te    Next Progress Note due:  7/9/21    Plan of Care Interventions:  [x] Therapeutic Exercise  [] Modalities:  [x] Therapeutic Activity     [] Ultrasound  [] Estim  [] Gait Training      [] Cervical Traction [] Lumbar Traction  [] Neuromuscular Re-education    [] Cold/hotpack [] Iontophoresis   [x] Instruction in HEP      [] Vasopneumatic   [] Dry Needling    [] Manual Therapy               [] Aquatic Therapy              Electronically signed by:  Toñito Post, PT,   6/21/2021, 9:45 AM

## 2021-06-23 ENCOUNTER — HOSPITAL ENCOUNTER (OUTPATIENT)
Dept: PHYSICAL THERAPY | Age: 66
Setting detail: THERAPIES SERIES
Discharge: HOME | End: 2021-06-23
Payer: MEDICAID

## 2021-06-23 PROCEDURE — 97140 MANUAL THERAPY 1/> REGIONS: CPT

## 2021-06-23 PROCEDURE — 97016 VASOPNEUMATIC DEVICE THERAPY: CPT

## 2021-06-23 PROCEDURE — 97110 THERAPEUTIC EXERCISES: CPT

## 2021-06-23 NOTE — FLOWSHEET NOTE
Wrist AROM 15x CW/CCW stop   Seated shrugs -- stop   Scap ret 2x10    Supine punch  AROM 1 x10 2x10   Supine FLEX AROM Arm @ side to 90* 2 x10 2x10    Supine shoulder circles  CW/CCW Shoulder @ 90* FLEX x15 CCW/CCW 2x10 ea way   Sidelying ABD  2x10    Sidelying ER  2x10                                      STANDING     Pendulum forward and back 15x 20x   Pendulum side to side 15x 20x   Pendulum circles 15x CW/CCW 20x ea way CW/CCW                            PROPRIOCEPTION                              MODALITIES     Vaso 15'           Other Therapeutic Activities/Education:  Pt educated on avoiding AROM in L shoulder and continuing HEP. Home Exercise Program:  Nothing new provided. Pt to continue curretnt HEP. Manual Treatments: PROM left shoulder flex, scaption.      Modalities:  Vaso to left shld 15' without adverse reaction     Communication with other providers:       Assessment:  (Response towards treatment session) (Pain Rating)  4/10 after treatment and 2/10 after VASO  Pt is a pleasant 62 yo male who would benefit from skilled PT to address decreased ROM, strength, endurance, functional mobility, and increased pain.      Plan for Next Session:  Continue per POC    Time In / Time Out:  1031/1131    Timed Code/Total Treatment Minutes:  60 15man 15vaso 30te    Next Progress Note due:  7/9/21    Plan of Care Interventions:  [x] Therapeutic Exercise  [] Modalities:  [x] Therapeutic Activity     [] Ultrasound  [] Estim  [] Gait Training      [] Cervical Traction [] Lumbar Traction  [] Neuromuscular Re-education    [] Cold/hotpack [] Iontophoresis   [x] Instruction in HEP      [] Vasopneumatic   [] Dry Needling    [] Manual Therapy               [] Aquatic Therapy              Electronically signed by:  Ashlyn Moy PTA  6/23/2021, 10:31 AM

## 2021-06-28 ENCOUNTER — HOSPITAL ENCOUNTER (OUTPATIENT)
Dept: PHYSICAL THERAPY | Age: 66
Setting detail: THERAPIES SERIES
Discharge: HOME | End: 2021-06-28
Payer: MEDICAID

## 2021-06-28 PROCEDURE — 97110 THERAPEUTIC EXERCISES: CPT

## 2021-06-28 NOTE — FLOWSHEET NOTE
Outpatient Physical Therapy  [place]           [x] Phone: [phone]   Fax: [phone]  [place]           [] Phone: [phone]   Fax: [phone]        Physical Therapy Daily Treatment Note  Date:  2021    Patient Name:  Shayna Alvarado    :  1955  MRN: [id number]  Restrictions/Precautions:   rotator cuff repair, no active ROM until 6 weeks post op -6 weeks as of 21   Diagnosis:     Z98.890 s/p arthroscopy of L shoulder. Date of Injury/Surgery: surgery: 5/10/21  Treatment Diagnosis:     M62.81 muscle weakness,   Insurance/Certification information:   [employer]  Referring Physician:   Dr Damián Trinh MD  Plan of care signed (Y/N):  elsa faxed  Outcome Measure: Quick DASH: 56.82  Visit# / total visits:  8/  Pain level: 2/10   Goals:       Short term goals to be achieved by 2021:  Short term goal 1: Pt will report compliance with current HEP as prescribed in order to improve ROM and strength. Short term goal 2: Pt will demonstrate AROM L shoulder flexion to at least 110 degrees in order to improve ROM. Short term goal 3: Pt will demonstrate AROM L shoulder abduction to at least 100 degrees in order to improve ROM. Short term goal 4: Pt will demonstrate AROM L UE supination to at least 80 degrees in order to improve ROM. Short term goal 5: Pt will demonstrate L  strength to at least 75 lbs in order to improve strength. Short term goal 6: Pt will demonstrate a score of no more than 45 on the Quick DASH in order to improve quality of life. Subjective:   Patient reports of 2/10 pain. Pt reports he is still sleeping in recliner due to continuing to have trouble sleeping when lying in his bed. Any changes in Ambulatory Summary Sheet?   None    Objective:     COVID screening questions were asked and patient attested that there had been no contact or symptoms    Exercises: (No more than 4 columns)   Exercise/Equipment 21 6/28/21           WARM UP      Pulleys     15x5\" 15x5\"         TABLE      Elbow flex/ext 20 reps L only stop    Wrist AROM 15x CW/CCW stop    Seated shrugs -- stop    Scap ret 2x10     Supine punch  AROM 1 x10 2x10 2x10   Supine FLEX AROM Arm @ side to 90* 2 x10 2x10  2 x10   Supine shoulder circles  CW/CCW Shoulder @ 90* FLEX x15 CCW/CCW 2x10 ea way 2x10 ea way   Sidelying ABD  2x10  2x10    Sidelying ER  2x10  2x10    Flex AAROM towel    2x10   sidelying horizontal ABD   2x10   Bicep curls    1# 2 x10                           STANDING      Pendulum forward and back 15x 20x    Pendulum side to side 15x 20x    Pendulum circles 15x CW/CCW 20x ea way CW/CCW    AAROM Flexion    Large green ball on plinth 2x10    AAROM scaption    Large green ball on plinth 2x10    Shoulder circles CW/CCW   Large green ball on plinth in standing shoulder FLEX 90*  x15 ea way              PROPRIOCEPTION                                    MODALITIES      Vaso 15'             Other Therapeutic Activities/Education:  Pt educated on continuing HEP. Home Exercise Program:  Nothing new provided. Pt to continue currentt HEP. Manual Treatments: none    Modalities:  none    Communication with other providers:   PT present     Assessment:  (Response towards treatment session) (Pain Rating)  1-2/10 after treatment.   Pt is a pleasant 62 yo male who would benefit from skilled PT to address decreased ROM, strength, endurance, functional mobility, and increased pain.      Plan for Next Session:  Continue per POC    Time In / Time Out:  1115/1200    Timed Code/Total Treatment Minutes:  45 min  3te    Next Progress Note due:  7/9/21    Plan of Care Interventions:  [x] Therapeutic Exercise  [] Modalities:  [x] Therapeutic Activity     [] Ultrasound  [] Estim  [] Gait Training      [] Cervical Traction [] Lumbar Traction  [] Neuromuscular Re-education    [] Cold/hotpack [] Iontophoresis   [x] Instruction in HEP      [] Vasopneumatic   [] Dry Ryan    [] Manual Therapy               [] Aquatic Therapy              Electronically signed by:  Nima Reid, NORMA  6/28/2021, 11:10 AM

## 2021-06-30 ENCOUNTER — HOSPITAL ENCOUNTER (OUTPATIENT)
Dept: PHYSICAL THERAPY | Age: 66
Setting detail: THERAPIES SERIES
Discharge: HOME | End: 2021-06-30
Payer: MEDICAID

## 2021-06-30 PROCEDURE — 97016 VASOPNEUMATIC DEVICE THERAPY: CPT

## 2021-06-30 PROCEDURE — 97110 THERAPEUTIC EXERCISES: CPT

## 2021-06-30 NOTE — FLOWSHEET NOTE
Cervical Traction [] Lumbar Traction  [] Neuromuscular Re-education    [] Cold/hotpack [] Iontophoresis   [x] Instruction in HEP      [] Vasopneumatic   [] Dry Needling    [] Manual Therapy               [] Aquatic Therapy              Electronically signed by:  Ronnie Rankin PTA  6/30/2021, 8:52 AM

## 2021-07-06 ENCOUNTER — HOSPITAL ENCOUNTER (OUTPATIENT)
Dept: PHYSICAL THERAPY | Age: 66
Setting detail: THERAPIES SERIES
Discharge: HOME | End: 2021-07-06
Payer: MEDICAID

## 2021-07-06 PROCEDURE — 97110 THERAPEUTIC EXERCISES: CPT

## 2021-07-06 PROCEDURE — 97016 VASOPNEUMATIC DEVICE THERAPY: CPT

## 2021-07-06 NOTE — FLOWSHEET NOTE
Outpatient Physical Therapy  [place]           [x] Phone: [phone]   Fax: [phone]  [place]           [] Phone: [phone]   Fax: [phone]        Physical Therapy Daily Treatment Note  Date:  2021    Patient Name:  Reymundo Gant    :  1955  MRN: [id number]  Restrictions/Precautions:   rotator cuff repair, no active ROM until 6 weeks post op -6 weeks as of 21   Diagnosis:     Z98.890 s/p arthroscopy of L shoulder. Date of Injury/Surgery: surgery: 5/10/21  Treatment Diagnosis:     M62.81 muscle weakness,   Insurance/Certification information:   [employer]  Referring Physician:   Dr Jorge Cuba MD  Plan of care signed (Y/N):  elsa faxed  Outcome Measure: Quick DASH: 56.82  Visit# / total visits:  10/  Pain level: 3/10   Goals:       Short term goals to be achieved by 2021:  Short term goal 1: Pt will report compliance with current HEP as prescribed in order to improve ROM and strength. Short term goal 2: Pt will demonstrate AROM L shoulder flexion to at least 110 degrees in order to improve ROM. Short term goal 3: Pt will demonstrate AROM L shoulder abduction to at least 100 degrees in order to improve ROM. Short term goal 4: Pt will demonstrate AROM L UE supination to at least 80 degrees in order to improve ROM. Short term goal 5: Pt will demonstrate L  strength to at least 75 lbs in order to improve strength. Short term goal 6: Pt will demonstrate a score of no more than 45 on the Quick DASH in order to improve quality of life. Subjective:   Patient reports of 3/10 pain upon arrival. Pt states he went to the hospital on  due to pain in L shoulder and neck that felt hot and like something was compressing down. He states it is much better today, but still painful. Any changes in Ambulatory Summary Sheet?   Yes, pt reports he was given a muscle relaxer at the hospital.     Objective: no increase in pain with new exs added    COVID screening questions were asked and patient attested that there had been no contact or symptoms    Exercises: (No more than 4 columns)   Exercise/Equipment 6/23/2021 6/28/21 6/30/2021 7/6/21            WARM UP       Pulleys    15x5\" 15x5\" 20x5\" 20x5\"          TABLE       Supine punch  AROM 2x10 2x10 Beach chair position 2x10 w/small green ball Beach chair position 2x10 w/small green ball   Supine FLEX AROM 2x10  2 x10 Beach chair position Flex to 90* 2x10 w/small green ball Beach chair position Flex to 90* x10 w/small green ball    Supine shoulder circles  CW/CCW 2x10 ea way 2x10 ea way Beach chair position 2x10 ea way Not performed    Sidelying ABD 2x10  2x10  2x10 2x10   Sidelying ER 2x10  2x10  2x10 w/towel 2x10 w/towel   Flex AAROM towel   2x10 2x10 2x10   sidelying horizontal ABD  2x10 ------    Bicep curls   1# 2 x10 ------    Prone rows   10x 10x   Prone ext   10x 10x   Prone Horiz ABD   10x 10x                                             STANDING       Pendulum forward and back 20x      Pendulum side to side 20x      Pendulum circles 20x ea way CW/CCW      AAROM Flexion   Large green ball on plinth 2x10      AAROM scaption   Large green ball on plinth 2x10   Large green ball on plinth 2x10    Shoulder circles CW/CCW  Large green ball on plinth in standing shoulder FLEX 90*  x15 ea way  Large green ball on plinth in standing shoulder Scaption 90*  x15 ea way               PROPRIOCEPTION                                          MODALITIES       Vaso    Vaso            Other Therapeutic Activities/Education:  Pt educated on continuing HEP. Home Exercise Program:  Nothing new provided. Pt to continue currentt HEP.     Manual Treatments: none    Modalities:  VASO 15mins to left shld    Communication with other providers:   PT present     Assessment:  (Response towards treatment session) (Pain Rating)  Pt reported 8-9/10 pain when performing flexion above approx 100 degrees, 3/10 pain following treatment and pt reports 0/10 pain after vaso.    Pt is a pleasant 62 yo male who would benefit from skilled PT to address decreased ROM, strength, endurance, functional mobility, and increased pain.      Plan for Next Session:  Continue per POC    Time In / Time Out:  0500/0550    Timed Code/Total Treatment Minutes:  50   1vaso 2te    Next Progress Note due:  7/9/21    Plan of Care Interventions:  [x] Therapeutic Exercise  [] Modalities:  [x] Therapeutic Activity     [] Ultrasound  [] Estim  [] Gait Training      [] Cervical Traction [] Lumbar Traction  [] Neuromuscular Re-education    [] Cold/hotpack [] Iontophoresis   [x] Instruction in HEP      [] Vasopneumatic   [] Dry Needling    [] Manual Therapy               [] Aquatic Therapy              Electronically signed by:  Jennifer Borjas, SPT  7/6/2021, 4:57 PM

## 2021-07-08 ENCOUNTER — HOSPITAL ENCOUNTER (OUTPATIENT)
Dept: PHYSICAL THERAPY | Age: 66
Setting detail: THERAPIES SERIES
Discharge: HOME | End: 2021-07-08
Payer: MEDICAID

## 2021-07-08 PROCEDURE — 97016 VASOPNEUMATIC DEVICE THERAPY: CPT

## 2021-07-08 PROCEDURE — 97110 THERAPEUTIC EXERCISES: CPT

## 2021-07-08 NOTE — FLOWSHEET NOTE
Outpatient Physical Therapy  [place]           [x] Phone: [phone]   Fax: [phone]  [place]           [] Phone: [phone]   Fax: [phone]        Physical Therapy Daily Treatment Note  Date:  2021    Patient Name:  Liseth Lizarraga    :  1955  MRN: [id number]  Restrictions/Precautions:   rotator cuff repair, no active ROM until 6 weeks post op -6 weeks as of 21   Diagnosis:     Z98.890 s/p arthroscopy of L shoulder. Date of Injury/Surgery: surgery: 5/10/21  Treatment Diagnosis:     M62.81 muscle weakness,   Insurance/Certification information:   [facility]  Referring Physician:   Dr Augie Randall MD  Plan of care signed (Y/N):  elsa faxed  Outcome Measure: Quick DASH: 56.82  Visit# / total visits:  11/  Pain level: 4/10     Goals:     Short term goals to be achieved by 2021:  Short term goal 1: Pt will report compliance with current HEP as prescribed in order to improve ROM and strength. Short term goal 2: Pt will demonstrate AROM L shoulder flexion to at least 110 degrees in order to improve ROM. Short term goal 3: Pt will demonstrate AROM L shoulder abduction to at least 100 degrees in order to improve ROM. Short term goal 4: Pt will demonstrate AROM L UE supination to at least 80 degrees in order to improve ROM. Short term goal 5: Pt will demonstrate L  strength to at least 75 lbs in order to improve strength. Short term goal 6: Pt will demonstrate a score of no more than 45 on the Quick DASH in order to improve quality of life. Subjective:   Patient reports of 2/10 pain upon arrival. Pt says that his shld is feeling better.     Any changes in Ambulatory Summary Sheet? no    Objective: mm shaking with end range SL abduction    COVID screening questions were asked and patient attested that there had been no contact or symptoms    Exercises: (No more than 4 columns)   Exercise/Equipment 21 WARM UP       Pulleys    15x5\" 20x5\" 20x5\" 20x5\"          TABLE       Supine punch  AROM 2x10 Beach chair position 2x10 w/small green ball Beach chair position 2x10 w/small green ball Beach chair position 2x10 w/small green ball   Supine FLEX AROM 2 x10 Beach chair position Flex to 90* 2x10 w/small green ball Beach chair position Flex to 90* x10 w/small green ball  Beach chair position 2x10 w/small green ball   Supine shoulder circles  CW/CCW 2x10 ea way Beach chair position 2x10 ea way Not performed  [employer] chair position 2x10 ea way  W/ small green ball   Sidelying ABD 2x10  2x10 2x10 2x10   Sidelying ER  2x10  2x10 w/towel 2x10 w/towel 2x10 w/towel   Flex AAROM towel  2x10 2x10 2x10 2x10   sidelying horizontal ABD 2x10 ------     Bicep curls  1# 2 x10 ------     Prone rows  10x 10x 2x10   Prone ext  10x 10x 2x10   Prone Horiz ABD  10x 10x                                              STANDING       Pendulum forward and back       Pendulum side to side       Pendulum circles       AAROM Flexion  Large green ball on plinth 2x10       AAROM scaption  Large green ball on plinth 2x10   Large green ball on plinth 2x10  Large green ball on plinth 2x10    Shoulder circles CW/CCW Large green ball on plinth in standing shoulder FLEX 90*  x15 ea way  Large green ball on plinth in standing shoulder Scaption 90*  x15 ea way Large green ball on plinth in standing shoulder Scaption 90*  x15 ea way               PROPRIOCEPTION                                          MODALITIES       Vaso   Vaso VASO            Other Therapeutic Activities/Education:  Pt educated on continuing HEP. Home Exercise Program:  Nothing new provided. Pt to continue currentt HEP. Manual Treatments: none    Modalities:  VASO 15mins to left shld    Communication with other providers:   PT present     Assessment:  (Response towards treatment session) (Pain Rating)  Pt 4/10 pain following treatment and pt reports 2/10 pain after vaso.    Pt is a pleasant 64 yo male who would benefit from skilled PT to address decreased ROM, strength, endurance, functional mobility, and increased pain.      Plan for Next Session:  Continue per POC    Time In / Time Out:  6520/0518    Timed Code/Total Treatment Minutes:  60   1vaso 3te    Next Progress Note due:  7/9/21    Plan of Care Interventions:  [x] Therapeutic Exercise  [] Modalities:  [x] Therapeutic Activity     [] Ultrasound  [] Estim  [] Gait Training      [] Cervical Traction [] Lumbar Traction  [] Neuromuscular Re-education    [] Cold/hotpack [] Iontophoresis   [x] Instruction in HEP      [] Vasopneumatic   [] Dry Needling    [] Manual Therapy               [] Aquatic Therapy              Electronically signed by:  Neli Braun PTA  7/8/2021, 9:46 AM

## 2021-07-13 ENCOUNTER — HOSPITAL ENCOUNTER (OUTPATIENT)
Dept: PHYSICAL THERAPY | Age: 66
Setting detail: THERAPIES SERIES
Discharge: HOME | End: 2021-07-13
Payer: MEDICAID

## 2021-07-13 PROCEDURE — 97110 THERAPEUTIC EXERCISES: CPT

## 2021-07-13 PROCEDURE — 97016 VASOPNEUMATIC DEVICE THERAPY: CPT

## 2021-07-13 NOTE — FLOWSHEET NOTE
Outpatient Physical Therapy  [place]           [x] Phone: [phone]   Fax: [phone]  [place]           [] Phone: [phone]   Fax: [phone]        Physical Therapy Daily Treatment Note  Date:  2021    Patient Name:  Cassie Mcneil    :  1955  MRN: [id number]  Restrictions/Precautions:   rotator cuff repair, no active ROM until 6 weeks post op -6 weeks as of 21   Diagnosis:     Z98.890 s/p arthroscopy of L shoulder. Date of Injury/Surgery: surgery: 5/10/21  Treatment Diagnosis:     M62.81 muscle weakness,   Insurance/Certification information:   [employer]  Referring Physician:   Dr Vivian Barrett MD  Plan of care signed (Y/N):  eval faxed  Outcome Measure: Quick DASH: 56.82  Visit# / total visits:  12/  Pain level: 2/10     Goals:     Short term goals to be achieved by 2021:  Short term goal 1: Pt will report compliance with current HEP as prescribed in order to improve ROM and strength.--MET   Short term goal 2: Pt will demonstrate AROM L shoulder flexion to at least 110 degrees in order to improve ROM. --115* MET  Short term goal 3: Pt will demonstrate AROM L shoulder abduction to at least 100 degrees in order to improve ROM. --93*, shoulder hike present  NOT MET  Short term goal 4: Pt will demonstrate AROM L UE supination to at least 80 degrees in order to improve ROM. --72* NOT MET   Short term goal 5: Pt will demonstrate L  strength to at least 75 lbs in order to improve strength. -- AV.3lbs NOT MET  Short term goal 6: Pt will demonstrate a score of no more than 45 on the Quick DASH in order to improve quality of life. --22.7 MET    Subjective:   Patient reports of 2/10 pain upon arrival. Pt reports a rib cracked on his L side last session when doing prone exercises on table and has felt uncomfortable ever since then.  Pt reports he has a doctors appointment coming up he thinks next week and he is going to ask about imaging for his ribs.     Any changes in Ambulatory Summary Sheet? no    Objective: Shoulder hike present with L shoulder ABD AROM     COVID screening questions were asked and patient attested that there had been no contact or symptoms  Quick DASH: 22.7   L  : 40, 45, 60, AV.3 lbs   R : 83, 80, 81 AV.3 lbs  Exercises: (No more than 4 columns)   Exercise/Equipment 2021            WARM UP       Pulleys    20x5\" 20x5\" 20x5\" 20x5\"          TABLE       Supine punch  AROM [place] chair position 2x10 w/small green ball Beach chair position 2x10 w/small green ball Beach chair position 2x10 w/small green ball [place] chair position 2x10 w/small green ball   Supine FLEX AROM [place] chair position Flex to 90* 2x10 w/small green ball Beach chair position Flex to 90* x10 w/small green ball  Beach chair position 2x10 w/small green ball Beach chair position 2x10 w/small green ball   Supine shoulder circles  CW/CCW [place] chair position 2x10 ea way Not performed  [employer] chair position 2x10 ea way  W/ small green ball Beach chair position 2x10 w/small green ball   Sidelying ABD 2x10 2x10 2x10 2x10   Sidelying ER 2x10 w/towel 2x10 w/towel 2x10 w/towel 2x10 w/towel   Flex AAROM towel  2x10 2x10 2x10 2x10   sidelying horizontal ABD ------   2x10 with green ball    Bicep curls  ------      Prone rows 10x 10x 2x10    Prone ext 10x 10x 2x10    Prone Horiz ABD 10x 10x                                               STANDING       Pendulum forward and back       Pendulum side to side       Pendulum circles       AAROM Flexion        AAROM scaption   Large green ball on plinth 2x10  Large green ball on plinth 2x10  Large green ball on plinth 2x10    Shoulder circles CW/CCW  Large green ball on plinth in standing shoulder Scaption 90*  x15 ea way Large green ball on plinth in standing shoulder Scaption 90*  x15 ea way Large green ball on plinth in standing shoulder Scaption 90*  x15 ea way               PROPRIOCEPTION MODALITIES       Vaso  Vaso VASO Vaso            Other Therapeutic Activities/Education:  Pt educated on continuing HEP. Prone activities not performed this session due to patients L rib pain. Home Exercise Program:  Nothing new provided. Pt to continue current HEP. Manual Treatments: none    Modalities:  VASO 15mins to left shld    Communication with other providers:   PT present     Assessment:  (Response towards treatment session) (Pain Rating)  During treatment pt reports most pain, 6/10, with flexion above 90*. Pt reports 1/10 pain following treatment and 0/10 pain after vaso.    Pt is a pleasant 62 yo male who would benefit from skilled PT to address decreased ROM, strength, endurance, functional mobility, and increased pain.      Plan for Next Session:  Continue per POC    Time In / Time Out: 1117/ 1220    Timed Code/Total Treatment Minutes:  58'   1vaso 3te    Next Progress Note due:  7/9/21    Plan of Care Interventions:  [x] Therapeutic Exercise  [] Modalities:  [x] Therapeutic Activity     [] Ultrasound  [] Estim  [] Gait Training      [] Cervical Traction [] Lumbar Traction  [] Neuromuscular Re-education    [] Cold/hotpack [] Iontophoresis   [x] Instruction in HEP      [] Vasopneumatic   [] Dry Needling    [] Manual Therapy               [] Aquatic Therapy              Electronically signed by:  Chris Mahoney, NORMA  7/13/2021, 11:10 AM

## 2021-07-15 NOTE — PROGRESS NOTES
Physical Therapy        [facility]  [address] [place] [id number]  Phone: [phone]  Fax: [phone]      Physician: Dr Gato Fernandez MD    From: Kathrin Lopez PT     Patient: Gregg Nielson                  : 1955  Diagnosis: Z98.890 s/p arthroscopy of L shoulder  Date: 7/15/2021  Treatment Diagnosis:  M62.81 muscle weakness    [x]  Progress Note                []  Discharge Note    Total Visits to date:   12 Cancels/No-shows to date:  0    Subjective:  Patient reports of 2/10 pain upon arrival. Pt reports a rib cracked on his L side last session when doing prone exercises on table and has felt uncomfortable ever since then. Pt reports he has a doctors appointment coming up he thinks next week and he is going to ask about imaging for his ribs    Plan of Care/Treatment to date:  [x] Therapeutic Exercise    [] Modalities:  [x] Therapeutic Activity     [] Ultrasound  [] Electric Stimulation  [] Gait Training      [] Cervical Traction    [] Lumbar Traction  [] Neuromuscular Re-education  [] Cold/hotpack [x] Iontophoresis  [x] Instruction in HEP      Other:  [] Manual Therapy       [x]  Vasopneumatic  [] Aquatic Therapy       []                          Objective/Significant Findings At Last Visit/Comments:    Shoulder hike present with L shoulder ABD AROM   Quick DASH: 22.7   L  : 40, 45, 60, AV.3 lbs   R : 83, 80, 81 AV.3 lbs    Assessment: During treatment pt reports most pain, 6/10, with flexion above 90*. Pt reports 1/10 pain following treatment and 0/10 pain after vaso. Pt is a pleasant 61 yo male who would benefit from skilled PT to address decreased ROM, strength, endurance, functional mobility, and increased pain.        Goal Status:  [] Achieved [x] Partially Achieved  [] Not Achieved   Short term goals to be achieved by 2021:  Short term goal 1: Pt will report compliance with current HEP as prescribed in order to improve ROM and strength.--MET   Short term goal 2: Pt will demonstrate AROM L shoulder flexion to at least 110 degrees in order to improve ROM.  --115* MET  Short term goal 3: Pt will demonstrate AROM L shoulder abduction to at least 100 degrees in order to improve ROM.  --93*, shoulder hike present  NOT MET  Short term goal 4: Pt will demonstrate AROM L UE supination to at least 80 degrees in order to improve ROM. --72* NOT MET   Short term goal 5: Pt will demonstrate L  strength to at least 75 lbs in order to improve strength. -- AV.3lbs NOT MET  Short term goal 6: Pt will demonstrate a score of no more than 45 on the Quick DASH in order to improve quality of life. --22.7 MET    Updated Goals to be achieved by : 2021  1. Continue   2. Pt will demonstrate AROM L shoulder flexion to at least 120 degrees in order to improve ROM  3. Continue   4. Continue   5. Continue   6. Pt will demonstrate a score of no more than 15 on the Quick DASH in order to improve quality of life. Frequency/Duration:  # Days per week: [] 1 day # Weeks: [] 1 week [] 4 weeks      [x] 2 days   [] 2 weeks [x] 5 weeks      [] 3 days   [] 3 weeks [] 6 weeks     Rehab Potential: [] Excellent [x] Good [] Fair  [] Poor         Patient Status: [x] Continue per initial plan of Care     [] Patient now discharged     [] Additional visits requested, Please re-certify for additional visits:      Requested frequency/duration: 2/week for 5 weeks    If we are requesting more visits, we fully anticipate the patient's condition is expected to improve within the treatment timeframe we are requesting. Electronically signed by:  NORMA March, 7/15/2021, 5:25 PM    If you have any questions or concerns, please don't hesitate to call.   Thank you for your referral.    Physician Signature:______________________ Date:______ Time: ________  By signing above, therapists plan is approved by physician

## 2021-07-16 ENCOUNTER — HOSPITAL ENCOUNTER (OUTPATIENT)
Dept: PHYSICAL THERAPY | Age: 66
Setting detail: THERAPIES SERIES
Discharge: HOME | End: 2021-07-16
Payer: MEDICAID

## 2021-07-16 PROCEDURE — 97110 THERAPEUTIC EXERCISES: CPT

## 2021-07-16 NOTE — FLOWSHEET NOTE
Exercise/Equipment 7/8/21 7/13/21 7.16.21           WARM UP      Pulleys    20x5\" 20x5\" 20x5\"   Arm bike    2' FWD/BWD   TABLE      Supine punch  AROM [place] chair position 2x10 w/small green ball Beach chair position 2x10 w/small green ball [place] chair position 2x10 w/small green ball   Supine FLEX AROM [place] chair position 2x10 w/small green ball [place] chair position 2x10 w/small green ball [place] chair position 2x10 w/small green ball   Supine shoulder circles  CW/CCW [place] chair position 2x10 ea way  W/ small green ball [place] chair position 2x10 w/small green ball [place] chair position 2x10 w/small green ball   Sidelying ABD 2x10 2x10 2x10   Sidelying ER 2x10 w/towel 2x10 w/towel 2x10 w/towel   Flex AAROM towel  2x10 2x10 2x10   sidelying horizontal ABD  2x10 with green ball  2x10 with green ball    Bicep curls       Prone rows 2x10     Prone ext 2x10     Prone Horiz ABD      Digiflex    7 lbs 2x20   Black clips     4x                           STANDING      Pendulum forward and back      Pendulum side to side      Pendulum circles      AAROM Flexion    Large green ball on plinth 2 x10   AAROM scaption  Large green ball on plinth 2x10  Large green ball on plinth 2x10  Large green ball on plinth 2x10    Shoulder circles CW/CCW Large green ball on plinth in standing shoulder Scaption 90*  x15 ea way Large green ball on plinth in standing shoulder Scaption 90*  x15 ea way Large green ball on plinth in standing shoulder flexion 90*  x15 ea way    AAROM shoulder flex on wall. 2x10   PROPRIOCEPTION                                    MODALITIES      Vaso VASO Vaso            Other Therapeutic Activities/Education:  Pt educated on continuing HEP. Prone activities not performed this session due to patients L rib pain. Home Exercise Program:  Nothing new provided. Pt to continue current HEP.     Manual Treatments: none    Modalities:  None     Communication with other providers:   PT present     Assessment: (Response towards treatment session) (Pain Rating) Pt reports 2/10 pain following treatment with L arm just by his side and 5-6/10 pain with his L shoulder flexed to 90*.    Pt is a pleasant 62 yo male who would benefit from skilled PT to address decreased ROM, strength, endurance, functional mobility, and increased pain.      Plan for Next Session:  Continue per POC    Time In / Time Out: 1116/ 1158    Timed Code/Total Treatment Minutes: 43 '   3te    Next Progress Note due:  7/9/21    Plan of Care Interventions:  [x] Therapeutic Exercise  [] Modalities:  [x] Therapeutic Activity     [] Ultrasound  [] Estim  [] Gait Training      [] Cervical Traction [] Lumbar Traction  [] Neuromuscular Re-education    [] Cold/hotpack [] Iontophoresis   [x] Instruction in HEP      [] Vasopneumatic   [] Dry Needling    [] Manual Therapy               [] Aquatic Therapy              Electronically signed by:  NORMA Hinds  7/16/2021, 11:32 AM

## 2021-07-19 ENCOUNTER — HOSPITAL ENCOUNTER (OUTPATIENT)
Dept: PHYSICAL THERAPY | Age: 66
Setting detail: THERAPIES SERIES
Discharge: HOME | End: 2021-07-19
Payer: MEDICAID

## 2021-07-19 PROCEDURE — 97110 THERAPEUTIC EXERCISES: CPT

## 2021-07-19 NOTE — FLOWSHEET NOTE
Outpatient Physical Therapy  [place]           [x] Phone: [phone]   Fax: [phone]  [place]           [] Phone: [phone]   Fax: [phone]        Physical Therapy Daily Treatment Note  Date:  2021    Patient Name:  Srinivasan Walton    :  1955  MRN: [id number]  Restrictions/Precautions:   rotator cuff repair, no active ROM until 6 weeks post op -6 weeks as of 21   Diagnosis:     Z98.890 s/p arthroscopy of L shoulder. Date of Injury/Surgery: surgery: 5/10/21  Treatment Diagnosis:     M62.81 muscle weakness,   Insurance/Certification information:   [employer]  Referring Physician:   Dr Allie Pascual MD  Plan of care signed (Y/N):  eval faxed  Outcome Measure: Quick DASH: 56.82  Visit# / total visits:  14/  Pain level: 5/10     Goals:     Short term goals to be achieved by 2021:  Short term goal 1: Pt will report compliance with current HEP as prescribed in order to improve ROM and strength. Short term goal 2:  Pt will demonstrate AROM L shoulder flexion to at least 120 degrees in order to improve ROM  Short term goal 3: Pt will demonstrate AROM L shoulder abduction to at least 100 degrees in order to improve ROM. Short term goal 4: Pt will demonstrate AROM L UE supination to at least 80 degrees in order to improve ROM. Short term goal 5: Pt will demonstrate L  strength to at least 75 lbs in order to improve strength. Short term goal 6: Pt will demonstrate a score of no more than 15 on the Quick DASH in order to improve quality of life. Subjective:   Patient reports  5/10 L shoulder pain upon arrival. Pt reports L ribs are still painful and so is the L side of his neck. Any changes in Ambulatory Summary Sheet? no    Objective:  Patient reports shoulder pain increases during pulleys at approx 110*.      COVID screening questions were asked and patient attested that there had been no contact or symptoms    Exercises: (No more than 4 columns)   Exercise/Equipment 7/8/21 7/13/21 7.16.21 7.19.21            WARM UP       Pulleys    20x5\" 20x5\" 20x5\" 20x5\"   Arm bike    2' FWD/BWD 2' FWD/BWD   TABLE       Supine punch  AROM [place] chair position 2x10 w/small green ball [place] chair position 2x10 w/small green ball [place] chair position 2x10 w/small green ball [address] chair position 2x10   Supine FLEX AROM [place] chair position 2x10 w/small green ball [place] chair position 2x10 w/small green ball [place] chair position 2x10 w/small green ball [place] chair position 2x10 w/small green ball   Supine shoulder circles  CW/CCW [place] chair position 2x10 ea way  W/ small green ball [address] chair position 2x10 w/small green ball [place] chair position 2x10 w/small green ball [place] chair position 2x10 w/small green ball   Sidelying ABD 2x10 2x10 2x10 2x10   Sidelying ER 2x10 w/towel 2x10 w/towel 2x10 w/towel 2x10 w/towel   Flex AAROM towel  2x10 2x10 2x10 3 x10    sidelying horizontal ABD  2x10 with green ball  2x10 with green ball  2x10 with green ball    Bicep curls        Prone rows 2x10      Prone ext 2x10      Prone Horiz ABD       Digiflex    7 lbs 2x20 9 lbs 2 x20   Black clips     4x 4x   Seated AAROM ABD with towel on plinth     2x15                        STANDING       Pendulum forward and back       Pendulum side to side       Pendulum circles       AAROM Flexion    Large green ball on plinth 2 x10 Large green ball on plinth 2x10   AAROM scaption  Large green ball on plinth 2x10  Large green ball on plinth 2x10  Large green ball on plinth 2x10  Large green ball on plinth 2x10   Shoulder circles CW/CCW Large green ball on plinth in standing shoulder Scaption 90*  x15 ea way Large green ball on plinth in standing shoulder Scaption 90*  x15 ea way Large green ball on plinth in standing shoulder flexion 90*  x15 ea way Large green ball on plinth in standing shoulder flexion 90*  2x10 ea way    AAROM shoulder flex on wall.     2x10    PROPRIOCEPTION

## 2021-07-21 ENCOUNTER — HOSPITAL ENCOUNTER (OUTPATIENT)
Dept: PHYSICAL THERAPY | Age: 66
Setting detail: THERAPIES SERIES
Discharge: HOME | End: 2021-07-21
Payer: MEDICAID

## 2021-07-21 PROCEDURE — 97110 THERAPEUTIC EXERCISES: CPT

## 2021-07-21 NOTE — FLOWSHEET NOTE
Outpatient Physical Therapy  [place]           [x] Phone: [phone]   Fax: [phone]  [place]           [] Phone: [phone]   Fax: [phone]        Physical Therapy Daily Treatment Note  Date:  2021    Patient Name:  Clarisse Brunner    :  1955  MRN: [id number]  Restrictions/Precautions:   rotator cuff repair, no active ROM until 6 weeks post op -6 weeks as of 21   Diagnosis:     Z98.890 s/p arthroscopy of L shoulder. Date of Injury/Surgery: surgery: 5/10/21  Treatment Diagnosis:     M62.81 muscle weakness,   Insurance/Certification information:   [employer]  Referring Physician:   Dr Qi Gerber MD  Plan of care signed (Y/N):  elsa faxed  Outcome Measure: Quick DASH: 56.82  Visit# / total visits:  15/  Pain level: 3/10     Goals:     Short term goals to be achieved by 2021:  Short term goal 1: Pt will report compliance with current HEP as prescribed in order to improve ROM and strength. Short term goal 2:  Pt will demonstrate AROM L shoulder flexion to at least 120 degrees in order to improve ROM  Short term goal 3: Pt will demonstrate AROM L shoulder abduction to at least 100 degrees in order to improve ROM. Short term goal 4: Pt will demonstrate AROM L UE supination to at least 80 degrees in order to improve ROM. Short term goal 5: Pt will demonstrate L  strength to at least 75 lbs in order to improve strength. Short term goal 6: Pt will demonstrate a score of no more than 15 on the Quick DASH in order to improve quality of life.     Subjective:   Patient reports  3/10 L shoulder pain upon arrival.    Any changes in Ambulatory Summary Sheet? no    Objective:      COVID screening questions were asked and patient attested that there had been no contact or symptoms    Exercises: (No more than 4 columns)   Exercise/Equipment 21           WARM UP      Pulleys    20x5\" 20x5\" 20x5\"   Arm bike  2' FWD/BWD 2' FWD/BWD 2'F/2'B   TABLE      Supine punch  AROM [place] chair position 2x10 w/small green ball [place] chair position 2x10 [place] chair position 2x10   Supine FLEX AROM [place] chair position 2x10 w/small green ball [place] chair position 2x10 w/small green ball [place] chair position 2x10 w/small green ball   Supine shoulder circles  CW/CCW [place] chair position 2x10 w/small green ball [place] chair position 2x10 w/small green ball [place] chair position 2x10 w/small green ball   Sidelying ABD 2x10 2x10 2x10   Sidelying ER 2x10 w/towel 2x10 w/towel 2x10 with towel   Flex AAROM towel  2x10 3 x10  3x10   sidelying horizontal ABD 2x10 with green ball  2x10 with green ball  2x10   Bicep curls       Prone rows      Prone ext      Prone Horiz ABD      Digiflex  7 lbs 2x20 9 lbs 2 x20 9# 2x20   Black clips   4x 4x 4x    Seated AAROM ABD with towel on plinth   2x15 2x15                     STANDING      Pendulum forward and back      Pendulum side to side      Pendulum circles      AAROM Flexion  Large green ball on plinth 2 x10 Large green ball on plinth 2x10 Large green ball on plinth 2x10   AAROM scaption  Large green ball on plinth 2x10  Large green ball on plinth 2x10 Large green ball on plinth 2x10   Shoulder circles CW/CCW Large green ball on plinth in standing shoulder flexion 90*  x15 ea way Large green ball on plinth in standing shoulder flexion 90*  2x10 ea way Large green ball on plinth in standing shoulder flexion 90*  2x10 ea way    AAROM shoulder flex on wall. 2x10     PROPRIOCEPTION                                    MODALITIES      Vaso              Other Therapeutic Activities/Education:  Pt educated on continuing HEP. Prone activities not performed this session due to patients L rib pain. Home Exercise Program:  Nothing new provided. Pt to continue current HEP.     Manual Treatments: none    Modalities:  None     Communication with other providers:   PT present     Assessment:  (Response towards treatment session) (Pain Rating) Patient continued to have pain in the front of the shoulder after treatment.   Rated this pain 3/10.     Plan for Next Session:  Continue per POC    Time In / Time Out: 1017/1100    Timed Code/Total Treatment Minutes: 37 '   3te    Next Progress Note due:  7/9/21    Plan of Care Interventions:  [x] Therapeutic Exercise  [] Modalities:  [x] Therapeutic Activity     [] Ultrasound  [] Estim  [] Gait Training      [] Cervical Traction [] Lumbar Traction  [] Neuromuscular Re-education    [] Cold/hotpack [] Iontophoresis   [x] Instruction in HEP      [] Vasopneumatic   [] Dry Needling    [] Manual Therapy               [] Aquatic Therapy              Electronically signed by:  Ez Krishnamurthy PTA  7/21/2021, 10:12 AM

## 2021-07-26 ENCOUNTER — HOSPITAL ENCOUNTER (OUTPATIENT)
Dept: PHYSICAL THERAPY | Age: 66
Setting detail: THERAPIES SERIES
Discharge: HOME | End: 2021-07-26
Payer: MEDICAID

## 2021-07-26 PROCEDURE — 97530 THERAPEUTIC ACTIVITIES: CPT

## 2021-07-26 PROCEDURE — 97110 THERAPEUTIC EXERCISES: CPT

## 2021-07-26 NOTE — FLOWSHEET NOTE
Outpatient Physical Therapy  [place]           [x] Phone: [phone]   Fax: [phone]  [place]           [] Phone: [phone]   Fax: [phone]        Physical Therapy Daily Treatment Note  Date:  2021    Patient Name:  Basil Alvarez    :  1955  MRN: [id number]  Restrictions/Precautions:   rotator cuff repair, no active ROM until 6 weeks post op -6 weeks as of 21   Diagnosis:     Z98.890 s/p arthroscopy of L shoulder. Date of Injury/Surgery: surgery: 5/10/21  Treatment Diagnosis:     M62.81 muscle weakness,   Insurance/Certification information:   [address]   Referring Physician:   Dr Dave Bishop MD  Plan of care signed (Y/N):  eval faxed  Outcome Measure: Quick DASH: 56.82  Visit# / total visits:  16/  Pain level: 2/10     Goals:     Short term goals to be achieved by 2021:  Short term goal 1: Pt will report compliance with current HEP as prescribed in order to improve ROM and strength. Short term goal 2:  Pt will demonstrate AROM L shoulder flexion to at least 120 degrees in order to improve ROM  Short term goal 3: Pt will demonstrate AROM L shoulder abduction to at least 100 degrees in order to improve ROM. Short term goal 4: Pt will demonstrate AROM L UE supination to at least 80 degrees in order to improve ROM. Short term goal 5: Pt will demonstrate L  strength to at least 75 lbs in order to improve strength. Short term goal 6: Pt will demonstrate a score of no more than 15 on the Quick DASH in order to improve quality of life. Subjective:   Patient reports of 2/10 pain in the shld upon arrival, and is to see his surgeon on the  of this month due to the pain in his neck that he's been having. Any changes in Ambulatory Summary Sheet? no    Objective:   Active Flex 130*    COVID screening questions were asked and patient attested that there had been no contact or symptoms    Exercises: (No more than 4 columns) Exercise/Equipment 7.19.21 7/21/2021 7/26/2021           WARM UP      Pulleys    20x5\" 20x5\" 20x5\"   Arm bike  2' FWD/BWD 2'F/2'B 2/2 F/B   TABLE      Supine punch  AROM [address] chair position 2x10 [address] chair position 2x10 [address] chair position 2x10   Supine FLEX AROM [place] chair position 2x10 w/small green ball [place] chair position 2x10 w/small green ball [place] chair position 2x10 w/small green ball   Supine shoulder circles  CW/CCW [place] chair position 2x10 w/small green ball [place] chair position 2x10 w/small green ball [place] chair position 2x10 w/small green ball   Sidelying ABD 2x10 2x10 2x10   Sidelying ER 2x10 w/towel 2x10 with towel 2x10 with towel   Flex AAROM towel  3 x10  3x10 3x10   sidelying horizontal ABD 2x10 with green ball  2x10 2x10   Bicep curls       Prone rows      Prone ext      Prone Horiz ABD      Digiflex  9 lbs 2 x20 9# 2x20 9# 2x20   Black clips  4x 4x  Standing plinth at waist height reaching up to top of pole    Seated AAROM ABD with towel on plinth  2x15 2x15                      STANDING      Pendulum forward and back      Pendulum side to side      Pendulum circles      AAROM Flexion  Large green ball on plinth 2x10 Large green ball on plinth 2x10 Large green ball on plinth 2x10   AAROM scaption  Large green ball on plinth 2x10 Large green ball on plinth 2x10 Large green ball on plinth 2x10   Shoulder circles CW/CCW Large green ball on plinth in standing shoulder flexion 90*  2x10 [address] way Large green ball on plinth in standing shoulder flexion 90*  2x10 ea way Large green ball on plinth in standing shoulder flexion 90*  2x10 ea way    AAROM shoulder flex on wall. PROPRIOCEPTION                                    MODALITIES      Vaso              Other Therapeutic Activities/Education:  Pt educated on continuing HEP. Prone activities not performed this session due to patients L rib pain. Home Exercise Program:  Nothing new provided. Pt to continue current HEP.     Manual Treatments: none    Modalities:  None     Communication with other providers:   PT present     Assessment:  (Response towards treatment session) (Pain Rating) Patient continued to have pain in the front of the shoulder after treatment.   Rated this pain 4/10.       Plan for Next Session:  Continue per POC    Time In / Time Out:  6697/8249    Timed Code/Total Treatment Minutes:  48 15ta 33te    Next Progress Note due:  7/9/21    Plan of Care Interventions:  [x] Therapeutic Exercise  [] Modalities:  [x] Therapeutic Activity     [] Ultrasound  [] Estim  [] Gait Training      [] Cervical Traction [] Lumbar Traction  [] Neuromuscular Re-education    [] Cold/hotpack [] Iontophoresis   [x] Instruction in HEP      [] Vasopneumatic   [] Dry Needling    [] Manual Therapy               [] Aquatic Therapy              Electronically signed by:  Osmany Fairbanks PTA  7/26/2021, 8:57 AM

## 2021-07-28 ENCOUNTER — HOSPITAL ENCOUNTER (OUTPATIENT)
Dept: PHYSICAL THERAPY | Age: 66
Setting detail: THERAPIES SERIES
Discharge: HOME | End: 2021-07-28
Payer: MEDICAID

## 2021-07-28 PROCEDURE — 97530 THERAPEUTIC ACTIVITIES: CPT

## 2021-07-28 PROCEDURE — 97110 THERAPEUTIC EXERCISES: CPT

## 2021-07-28 NOTE — FLOWSHEET NOTE
Outpatient Physical Therapy  [place]           [x] Phone: [phone]   Fax: [phone]  [place]           [] Phone: [phone]   Fax: [phone]        Physical Therapy Daily Treatment Note  Date:  2021    Patient Name:  Mk Brooks    :  1955  MRN: [id number]  Restrictions/Precautions:   rotator cuff repair, no active ROM until 6 weeks post op -6 weeks as of 21   Diagnosis:     Z98.890 s/p arthroscopy of L shoulder. Date of Injury/Surgery: surgery: 5/10/21  Treatment Diagnosis:     M62.81 muscle weakness,   Insurance/Certification information:   [employer]  Referring Physician:   Dr Mariela Barrera MD  Plan of care signed (Y/N):  elsa faxed  Outcome Measure: Quick DASH: 56.82  Visit# / total visits:  17/  Pain level: 2/10     Goals:     Short term goals to be achieved by 2021:  Short term goal 1: Pt will report compliance with current HEP as prescribed in order to improve ROM and strength. Short term goal 2:  Pt will demonstrate AROM L shoulder flexion to at least 120 degrees in order to improve ROM  Short term goal 3: Pt will demonstrate AROM L shoulder abduction to at least 100 degrees in order to improve ROM. Short term goal 4: Pt will demonstrate AROM L UE supination to at least 80 degrees in order to improve ROM. Short term goal 5: Pt will demonstrate L  strength to at least 75 lbs in order to improve strength. Short term goal 6: Pt will demonstrate a score of no more than 15 on the Quick DASH in order to improve quality of life. Subjective:   Continues to have the pain in the front of the shoulder. States this pain is constant and 2/10. Can get up to 4-5/10 with activity. Pain also remains on the left side of the neck.        Any changes in Ambulatory Summary Sheet? no    Objective:      COVID screening questions were asked and patient attested that there had been no contact or symptoms    Exercises: (No more than 4 columns)   Exercise/Equipment 7/21/2021 7/26/2021 7/28/2021           WARM UP      Pulleys    20x5\" 20x5\" 20x5\"    Arm bike  2'F/2'B 2/2 F/B 2/2 F/B   TABLE      Supine punch  AROM [place] chair position 2x10 [place] chair position 2x10 [address] chair position 2x10 w/small green ball   Supine FLEX AROM [place] chair position 2x10 w/small green ball [place] chair position 2x10 w/small green ball [place] chair position 2x10 w/small green ball   Supine shoulder circles  CW/CCW [place] chair position 2x10 w/small green ball [place] chair position 2x10 w/small green ball [place] chair position 2x10 w/small green ball   Sidelying ABD 2x10 2x10 2x10   Sidelying ER 2x10 with towel 2x10 with towel 2x10 with towel   Flex AAROM towel  3x10 3x10 3x10   sidelying horizontal ABD 2x10 2x10 2x10   Bicep curls       Prone rows      Prone ext      Prone Horiz ABD      Digiflex  9# 2x20 9# 2x20 9# 2x20   Black clips  4x  Standing plinth at waist height reaching up to top of pole  Standing plinth at waist height reaching up to top of pole 4x   Seated AAROM ABD with towel on plinth  2x15                       STANDING      Pendulum forward and back      Pendulum side to side      Pendulum circles      AAROM Flexion  Large green ball on plinth 2x10 Large green ball on plinth 2x10 Large green ball on plinth 2x10   AAROM scaption  Large green ball on plinth 2x10 Large green ball on plinth 2x10 Large green ball on plinth 2x10   Shoulder circles CW/CCW Large green ball on plinth in standing shoulder flexion 90*  2x10 [address] way Large green ball on plinth in standing shoulder flexion 90*  2x10 ea way Large green ball on plinth in standing shoulder flexion 90*  2x10 e[address] way    AAROM shoulder flex on wall. PROPRIOCEPTION                                    MODALITIES      Vaso              Other Therapeutic Activities/Education:  Pt educated on continuing HEP. Prone activities not performed this session due to patients L rib pain.      Home Exercise Program: Nothing new provided. Pt to continue current HEP. Manual Treatments: none    Modalities:  None     Communication with other providers:   PT present     Assessment:  (Response towards treatment session) (Pain Rating) Pain 4/10 in the front of the shoulder after treatment.   Continued to have the pain in the neck.        Plan for Next Session:  Continue per POC    Time In / Time Out: 4483/8684    Timed Code/Total Treatment Minutes:  43 15ta 28te    Next Progress Note due:  7/9/21    Plan of Care Interventions:  [x] Therapeutic Exercise  [] Modalities:  [x] Therapeutic Activity     [] Ultrasound  [] Estim  [] Gait Training      [] Cervical Traction [] Lumbar Traction  [] Neuromuscular Re-education    [] Cold/hotpack [] Iontophoresis   [x] Instruction in HEP      [] Vasopneumatic   [] Dry Needling    [] Manual Therapy               [] Aquatic Therapy              Electronically signed by:  Lynn Matthew PTA  7/28/2021, 9:47 AM

## 2021-07-29 ENCOUNTER — OFFICE VISIT (OUTPATIENT)
Dept: ORTHOPEDIC SURGERY | Age: 66
End: 2021-07-29
Payer: MEDICAID

## 2021-07-29 VITALS — HEIGHT: 70 IN | BODY MASS INDEX: 33.79 KG/M2 | WEIGHT: 236 LBS | RESPIRATION RATE: 15 BRPM

## 2021-07-29 DIAGNOSIS — M54.2 NECK PAIN: ICD-10-CM

## 2021-07-29 DIAGNOSIS — Z98.890 S/P ARTHROSCOPY OF LEFT SHOULDER: ICD-10-CM

## 2021-07-29 DIAGNOSIS — Z98.890 STATUS POST LEFT ROTATOR CUFF REPAIR: ICD-10-CM

## 2021-07-29 DIAGNOSIS — M47.22 CERVICAL SPONDYLOSIS WITH RADICULOPATHY: Primary | ICD-10-CM

## 2021-07-29 PROCEDURE — G8428 CUR MEDS NOT DOCUMENT: HCPCS | Performed by: ORTHOPAEDIC SURGERY

## 2021-07-29 PROCEDURE — 1036F TOBACCO NON-USER: CPT | Performed by: ORTHOPAEDIC SURGERY

## 2021-07-29 PROCEDURE — 3017F COLORECTAL CA SCREEN DOC REV: CPT | Performed by: ORTHOPAEDIC SURGERY

## 2021-07-29 PROCEDURE — 99214 OFFICE O/P EST MOD 30 MIN: CPT | Performed by: ORTHOPAEDIC SURGERY

## 2021-07-29 PROCEDURE — G8417 CALC BMI ABV UP PARAM F/U: HCPCS | Performed by: ORTHOPAEDIC SURGERY

## 2021-07-29 RX ORDER — METHOCARBAMOL 750 MG/1
TABLET, FILM COATED ORAL
COMMUNITY
Start: 2021-07-03

## 2021-07-29 NOTE — PATIENT INSTRUCTIONS
Continue weight-bearing as tolerated. Continue range of motion exercises as instructed. Ice and elevate as needed. Tylenol or Motrin for pain. Follow up in 6 weeks   continue working with physical therapy   MRI of the cervical spine   Follow up once MRI is completed   Central scheduling [phone] will be calling you to schedule your MRI. If you do not hear from them with in a week give them a call.

## 2021-07-29 NOTE — PROGRESS NOTES
Patient returns to the office today for s/p left shoulder scope DOS 5/10/21. Pt states pain today is an 5/10. Pt states pain is globally in his left shoulder traveling into the neck. Pt states he is having a burring pain in the shoulder.  He is still working with therapy

## 2021-08-01 ASSESSMENT — ENCOUNTER SYMPTOMS
EYE PAIN: 0
CHEST TIGHTNESS: 0
VOMITING: 0
WHEEZING: 0
SHORTNESS OF BREATH: 0
COLOR CHANGE: 0
EYE REDNESS: 0

## 2021-08-01 NOTE — PROGRESS NOTES
7/29/2021   Chief Complaint   Patient presents with    Post-Op Check     s/p left shoulder scope DOS 5/10/21      New problem: Neck pain is    History of Present Illness:                             Elias Jamison is a 63 y.o. male who returns today for follow-up of his left shoulder rotator cuff repair and also evaluation of new problem of neck pain. He is making progress with physical therapy with regards to shoulder but has been significantly limited because of severe worsening neck pain. He has been through chiropractic treatments anti-inflammatory medications in the past but feels that his neck is getting very stiff and painful and tender time performing his therapy exercises because of it. He has pain throughout the day which is worse with movement activities and also pain in his neck at night. Patient returns to the office today for s/p left shoulder scope DOS 5/10/21. Pt states pain today is an 5/10. Pt states pain is globally in his left shoulder traveling into the neck. Pt states he is having a burring pain in the shoulder. He is still working with therapy       Medical History  Patient's medications, allergies, past medical, surgical, social and family histories were reviewed and updated as appropriate.     Past Medical History:   Diagnosis Date    Acid reflux     Arthritis     Diabetes mellitus ([facility] [address]     dx since approx 2015\"    Full dentures     Hemochromatosis     \"have to much iron- last time they removed some blood was in Jan 2021\"follow with Dr Juan Carlos Medina in [place] for this    Hypertension     \"on medication since 2016\" follows with PCP    Wears glasses      Past Surgical History:   Procedure Laterality Date    COLONOSCOPY  05/08/2019    normal colonoscopy    COLONOSCOPY N/A 5/8/2019    COLORECTAL CANCER SCREENING, HIGH RISK performed by Dante Reed MD at [address] [facility] ARTHROSCOPY Left 5/10/2021    LEFT SHOULDER ARTHROSCOPY, ROTATOR CUFF REPAIR, SUBACROMIAL DECOMPRESSION performed by Alta Yeboah MD at [address] [facility]       Family History   Problem Relation Age of Onset    Cancer Father     Cancer Brother     Asthma Mother         copd     Social History     Socioeconomic History    Marital status:      Spouse name: Not on file    Number of children: Not on file    Years of education: Not on file    Highest education level: Not on file   Occupational History    Not on file   Tobacco Use    Smoking status: Never Smoker    Smokeless tobacco: Never Used   Vaping Use    Vaping Use: Never used   Substance and Sexual Activity    Alcohol use: Not Currently    Drug use: No    Sexual activity: Not on file   Other Topics Concern    Not on file   Social History Narrative    Not on file     Social Determinants of Health     Financial Resource Strain:     Difficulty of Paying Living Expenses:    Food Insecurity:     Worried About Running Out of Food in the Last Year:     [address] in the Last Year:    Transportation Needs:     Lack of Transportation (Medical):  Lack of Transportation (Non-Medical):    Physical Activity:     Days of Exercise per Week:     Minutes of Exercise per Session:    Stress:     Feeling of Stress :    Social Connections:     Frequency of Communication with Friends and Family:     Frequency of Social Gatherings with Friends and Family:     Attends [employer] Services:     Active Member of Clubs or Organizations:     Attends Club or Organization Meetings:     Marital Status:    Intimate Partner Violence:     Fear of Current or Ex-Partner:     Emotionally Abused:     Physically Abused:     Sexually Abused:      Current Outpatient Medications   Medication Sig Dispense Refill    gabapentin (NEURONTIN) 300 MG capsule Take 300 mg by mouth 4 times daily.       blood glucose test strips (ONETOUCH ULTRA) strip CHECK BLOOD GLUCOSE TWICE DAILY      ibuprofen (ADVIL;MOTRIN) 800 MG tablet Take 1 tablet by strength. Right elbow: Normal.      Left elbow: Normal. No swelling, deformity, effusion or lacerations. Normal range of motion. No tenderness. Cervical back: Rigidity, spasms, tenderness and bony tenderness present. No swelling, edema, deformity, erythema, signs of trauma or lacerations. Pain with movement present. Decreased range of motion. Comments: There is neck pain centrally at the mid cervical region during extremes of motion with moderately restricted rotational movements left and right as well as extension. There is a mildly positive Spurling sign on the right. There is tenderness to palpation centrally along the spinous processes at the C5-6 level. Sensation and motor function is intact throughout the upper extremities with no severe deficits with strength or sensation in the wrist and hands. Left upper extremity:  Well-healed surgical scars of the left shoulder. Active range of motion is present with forward elevation 110 degrees and abduction 90 degrees with good but moderately restricted internal and external rotation of the arm by the side. Rotator cuff is functional but painful and weak appearing   Skin:     General: Skin is warm and dry. Neurological:      Mental Status: He is alert and oriented to person, place, and time. Psychiatric:         Mood and Affect: Mood normal.         Behavior: Behavior normal.            Diagnostic testing:  X-ray images were reviewed by myself and discussed with the patient:  Narrative   3 views of the cervical spine show evidence of disc space narrowing and    anterior osteophyte formation at the C5-6 level with endplate sclerosis    present.       There is mild left scoliosis deformity present. Martha Mackenzie is no evidence of    acute abnormality or fracture.       Mild loss of cervical lordosis.          Office Procedures:  Orders Placed This Encounter   Procedures    XR CERVICAL SPINE (2-3 VIEWS)     Standing Status:   Future     Number of Occurrences:   1     Standing Expiration Date:   7/29/2022    MRI CERVICAL SPINE WO CONTRAST     Standing Status:   Future     Standing Expiration Date:   7/29/2022       Assessment and Plan  1. Cervical spondylosis C5-6    2. Left rotator cuff repair    We discussed his healing left rotator cuff following arthroscopic repair. He is making good progress with therapy and encouraged him to continue his exercise program to increase range of motion and begin more aggressive strengthening activities for the rotator cuff. He will continue with physical therapy and follow-up in 6 weeks for check of his progress. He wanted me to address a new problem. He is having severe stiffness and pain in his neck posteriorly. He has a difficult time rotating his head side to side and extending looking upward. He complains of sharp pain that radiates into his shoulders bilaterally. His pain has been severe and he has been treated in the past with chiropractic treatments but does not wish to return there. He requested an x-ray today and we reviewed the results which show degenerative changes at the C5-6 level. Given the significance of his pain and failure of therapy and anti-inflammatory medications, I recommended we proceed with an MRI to evaluate for spinal stenosis or nerve root impingement at the neck.     He will follow-up after the MRI is complete for review the results        Electronically signed by Earney Severs, MD on 8/1/2021 at 7:36 PM

## 2021-08-02 ENCOUNTER — HOSPITAL ENCOUNTER (OUTPATIENT)
Dept: PHYSICAL THERAPY | Age: 66
Setting detail: THERAPIES SERIES
Discharge: HOME | End: 2021-08-02
Payer: MEDICAID

## 2021-08-02 PROCEDURE — 97530 THERAPEUTIC ACTIVITIES: CPT

## 2021-08-02 PROCEDURE — 97110 THERAPEUTIC EXERCISES: CPT

## 2021-08-02 NOTE — FLOWSHEET NOTE
Outpatient Physical Therapy  [place]           [x] Phone: [phone]   Fax: [phone]  [place]           [] Phone: [phone]   Fax: [phone]        Physical Therapy Daily Treatment Note  Date:  2021    Patient Name:  Elias Jamison    :  1955  MRN: [id number]  Restrictions/Precautions:   rotator cuff repair, no active ROM until 6 weeks post op -6 weeks as of 21   Diagnosis:     Z98.890 s/p arthroscopy of L shoulder. Date of Injury/Surgery: surgery: 5/10/21  Treatment Diagnosis:     M62.81 muscle weakness,   Insurance/Certification information:   [employer]  Referring Physician:   Dr Scarlet Jones MD  Plan of care signed (Y/N):  eval faxed  Outcome Measure: Quick DASH: 56.82  Visit# / total visits:  18/  Pain level: 1-2/10     Goals:     Short term goals to be achieved by 2021:  Short term goal 1: Pt will report compliance with current HEP as prescribed in order to improve ROM and strength. Short term goal 2:  Pt will demonstrate AROM L shoulder flexion to at least 120 degrees in order to improve ROM  Short term goal 3: Pt will demonstrate AROM L shoulder abduction to at least 100 degrees in order to improve ROM. Short term goal 4: Pt will demonstrate AROM L UE supination to at least 80 degrees in order to improve ROM. Short term goal 5: Pt will demonstrate L  strength to at least 75 lbs in order to improve strength. Short term goal 6: Pt will demonstrate a score of no more than 15 on the Quick DASH in order to improve quality of life. Subjective:    Patient reports he saw his surgeon and told him about his neck pain and the doctor is putting in for an MRI of his neck. Any changes in Ambulatory Summary Sheet? no    Objective:   Active Flex 130*    COVID screening questions were asked and patient attested that there had been no contact or symptoms    Exercises: (No more than 4 columns)   Exercise/Equipment 2021 1030/1115      Timed Code/Total Treatment Minutes:  45  15ta 30te    Next Progress Note due:  7/9/21    Plan of Care Interventions:  [x] Therapeutic Exercise  [] Modalities:  [x] Therapeutic Activity     [] Ultrasound  [] Estim  [] Gait Training      [] Cervical Traction [] Lumbar Traction  [] Neuromuscular Re-education    [] Cold/hotpack [] Iontophoresis   [x] Instruction in HEP      [] Vasopneumatic   [] Dry Needling    [] Manual Therapy               [] Aquatic Therapy              Electronically signed by:  Aaron Dugan PTA  8/2/2021, 10:30 AM

## 2021-08-05 ENCOUNTER — HOSPITAL ENCOUNTER (OUTPATIENT)
Dept: PHYSICAL THERAPY | Age: 66
Setting detail: THERAPIES SERIES
Discharge: HOME | End: 2021-08-05
Payer: MEDICAID

## 2021-08-05 ENCOUNTER — TELEPHONE (OUTPATIENT)
Dept: ORTHOPEDIC SURGERY | Age: 66
End: 2021-08-05

## 2021-08-05 PROCEDURE — 97110 THERAPEUTIC EXERCISES: CPT

## 2021-08-05 PROCEDURE — 97530 THERAPEUTIC ACTIVITIES: CPT

## 2021-08-05 NOTE — FLOWSHEET NOTE
Outpatient Physical Therapy  [place]           [x] Phone: [phone]   Fax: [phone]  [place]           [] Phone: [phone]   Fax: [phone]        Physical Therapy Daily Treatment Note  Date:  2021    Patient Name:  Yelitza Ludwig    :  1955  MRN: [id number]  Restrictions/Precautions:   rotator cuff repair, no active ROM until 6 weeks post op -6 weeks as of 21   Diagnosis:     Z98.890 s/p arthroscopy of L shoulder. Date of Injury/Surgery: surgery: 5/10/21  Treatment Diagnosis:     M62.81 muscle weakness,   Insurance/Certification information:   [employer]  Referring Physician:   Dr Earney Severs MD  Plan of care signed (Y/N):  eval faxed  Outcome Measure: Quick DASH: 56.82  Visit# / total visits:  19/  Pain level: 2/10     Goals:     Short term goals to be achieved by 2021:  Short term goal 1: Pt will report compliance with current HEP as prescribed in order to improve ROM and strength. Short term goal 2:  Pt will demonstrate AROM L shoulder flexion to at least 120 degrees in order to improve ROM  Short term goal 3: Pt will demonstrate AROM L shoulder abduction to at least 100 degrees in order to improve ROM. Short term goal 4: Pt will demonstrate AROM L UE supination to at least 80 degrees in order to improve ROM. Short term goal 5: Pt will demonstrate L  strength to at least 75 lbs in order to improve strength. Short term goal 6: Pt will demonstrate a score of no more than 15 on the Quick DASH in order to improve quality of life. Subjective:  Patient rates his shoulder pain 2/10 and neck pain 8/10. Is trying to get the MRI for the neck scheduled.        Any changes in Ambulatory Summary Sheet? no    Objective:  Continues to demonstrate increased weakness with AROM    COVID screening questions were asked and patient attested that there had been no contact or symptoms    Exercises: (No more than 4 columns)   Exercise/Equipment 8/2/2021 8/5/2021          WARM UP     Pulleys    20x5\" 20x5\"   Arm bike  2/2 F/B 2/2 F/B   TABLE     Supine punch  AROM [place] chair position 2x10 w/small green ball [place] chair position 2x10 w/small green ball   Supine FLEX AROM [place] chair position 2x10 w/small green ball [place] chair position 2x10 w/small green ball   Supine shoulder circles  CW/CCW [place] chair position 2x10 w/small green ball [place] chair position 2x10 w/small green ball   Sidelying ABD 2x10 2x10 with small green ball   Sidelying ER 2x10 w/towel 2x10 with small green ball   Flex AAROM towel  3x10 3x10   sidelying horizontal ABD 2x10 2x10   Bicep curls      Prone rows     Prone ext     Prone Horiz ABD     Digiflex  9# 2x20 9# 2x20   Black clips  Standing plinth at waist height reaching up to top of pole 4x Standing plinth at waist height reaching up to top of pole 4x   Seated AAROM ABD with towel on plinth                     STANDING     Pendulum forward and back     Pendulum side to side     Pendulum circles     AAROM Flexion  Large green ball on plinth 2x10 Large green ball on plinth 2x10   AAROM scaption  Large green ball on plinth 2x10 Large green ball on plinth 2x10   Shoulder circles CW/CCW Large green ball on plinth in standing shoulder flexion 90*  2x10 ea way Large green ball on plinth in standing shoulder flexion 90*  2x10 ea way    AAROM shoulder flex on wall. PROPRIOCEPTION                              MODALITIES     Vaso            Other Therapeutic Activities/Education:  Pt educated on continuing HEP. Prone activities not performed this session due to patients L rib pain. Home Exercise Program:  Nothing new provided. Pt to continue current HEP.     Manual Treatments: none    Modalities:  None     Communication with other providers:   PT present     Assessment:  (Response towards treatment session) (Pain Rating) Patient continued to rate his shoulder pain 3/10 after treatment.      Plan for Next Session:  Continue per

## 2021-08-05 NOTE — TELEPHONE ENCOUNTER
Pt called into the office today in regards to his MRI he is supposed to have. Pt states he has not completed it yet and requested to have a referral sent to have an open MRI for his cervical neck, if possible. Pt prefers to go to [place] to have his MRI completed. Please Advise.      Phone: [phone]

## 2021-08-10 ENCOUNTER — HOSPITAL ENCOUNTER (OUTPATIENT)
Dept: PHYSICAL THERAPY | Age: 66
Setting detail: THERAPIES SERIES
Discharge: HOME | End: 2021-08-10
Payer: MEDICAID

## 2021-08-10 PROCEDURE — 97530 THERAPEUTIC ACTIVITIES: CPT

## 2021-08-10 PROCEDURE — 97110 THERAPEUTIC EXERCISES: CPT

## 2021-08-10 NOTE — FLOWSHEET NOTE
Outpatient Physical Therapy  [place]           [x] Phone: [phone]   Fax: [phone]  [place]           [] Phone: [phone]   Fax: [phone]        Physical Therapy Daily Treatment Note  Date:  8/10/2021    Patient Name:  Anahi Simon    :  1955  MRN: [id number]  Restrictions/Precautions:   rotator cuff repair, no active ROM until 6 weeks post op -6 weeks as of 21   Diagnosis:     Z98.890 s/p arthroscopy of L shoulder. Date of Injury/Surgery: surgery: 5/10/21  Treatment Diagnosis:     M62.81 muscle weakness,   Insurance/Certification information:   [employer]  Referring Physician:   Dr Pawan Caballero MD  Plan of care signed (Y/N):  elsa faxed  Outcome Measure: Quick DASH: 56.82  Visit# / total visits:  20/  Pain level: 2/10     Goals:     Short term goals to be achieved by 2021:  Short term goal 1: Pt will report compliance with current HEP as prescribed in order to improve ROM and strength. Short term goal 2:  Pt will demonstrate AROM L shoulder flexion to at least 120 degrees in order to improve ROM  Short term goal 3: Pt will demonstrate AROM L shoulder abduction to at least 100 degrees in order to improve ROM. Short term goal 4: Pt will demonstrate AROM L UE supination to at least 80 degrees in order to improve ROM. Short term goal 5: Pt will demonstrate L  strength to at least 75 lbs in order to improve strength. Short term goal 6: Pt will demonstrate a score of no more than 15 on the Quick DASH in order to improve quality of life. Subjective:  Patient rates his shoulder pain 2/10 and neck pain 4/10. To have an MRI on the 16 of his neck.     Any changes in Ambulatory Summary Sheet? no    Objective:   No increase in shld pain at end of session w/new exs added    COVID screening questions were asked and patient attested that there had been no contact or symptoms    Exercises: (No more than 4 columns)   Exercise/Equipment 8/5/2021 8/10/2021          WARM UP     Pulleys    20x5\" 20x5\"   Arm bike  2/2 F/B 2/2 F/B   [address] chair punch  AROM [place] chair position 2x10 w/small green ball  2x10 w/small green ball   [address] [facility] chair position 2x10 w/small green ball   2x10 w/small green ball   Beach chair shoulder circles  CW/CCW [place] chair position 2x10 w/small green ball  2x10 w/small green ball   Sidelying ABD 2x10 with small green ball    Sidelying ER 2x10 with small green ball    Flex AAROM towel  3x10    sidelying horizontal ABD 2x10    Digiflex  9# 2x20    Black clips  Standing plinth at waist height reaching up to top of pole 4x    Seated AAROM ABD with towel on [address] chair flex w/cane  10x   Beach chair ABC's  1x through                                      [employer] Flexion  Large green ball on plinth 2x10    AAROM scaption  Large green ball on plinth 2x10    Shoulder circles CW/CCW Large green ball on plinth in standing shoulder flexion 90*  2x10 ea way     AAROM shoulder flex on wall. With towel hands clasped 10x   With roller flex 10x  scap 10x   PROPRIOCEPTION     Ball around waist   CW/CCW 2x10   Cane ER at wall  2x10 5\"                  MODALITIES     Vaso            Other Therapeutic Activities/Education:  Pt educated on continuing HEP. Prone activities not performed this session due to patients L rib pain. Home Exercise Program:  Nothing new provided. Pt to continue current HEP.     Manual Treatments: none    Modalities:  None     Communication with other providers:   PT present     Assessment:  (Response towards treatment session) (Pain Rating) Patient continued to rate his shoulder pain 2/10 shld  6-7/10 neck after treatment.        Plan for Next Session:  Continue per POC    Time In / Time Out:   3512/4843      Timed Code/Total Treatment Minutes:  50  15ta 35te    Next Progress Note due:  7/9/21    Plan of Care Interventions:  [x] Therapeutic Exercise  [] Modalities:  [x] Therapeutic Activity     [] Ultrasound  [] Estim  [] Gait Training      [] Cervical Traction [] Lumbar Traction  [] Neuromuscular Re-education    [] Cold/hotpack [] Iontophoresis   [x] Instruction in HEP      [] Vasopneumatic   [] Dry Needling    [] Manual Therapy               [] Aquatic Therapy              Electronically signed by:  Merlinda Barbone, PTA  8/10/2021, 8:57 AM

## 2021-08-12 ENCOUNTER — HOSPITAL ENCOUNTER (OUTPATIENT)
Dept: PHYSICAL THERAPY | Age: 66
Setting detail: THERAPIES SERIES
Discharge: HOME | End: 2021-08-12
Payer: MEDICAID

## 2021-08-12 PROCEDURE — 97112 NEUROMUSCULAR REEDUCATION: CPT

## 2021-08-12 NOTE — FLOWSHEET NOTE
Outpatient Physical Therapy  [place]           [x] Phone: [phone]   Fax: [phone]  [place]           [] Phone: [phone]   Fax: [phone]        Physical Therapy Daily Treatment Note  Date:  2021    Patient Name:  Blane Gallegos    :  1955  MRN: [id number]  Restrictions/Precautions:   rotator cuff repair, no active ROM until 6 weeks post op -6 weeks as of 21   Diagnosis:     Z98.890 s/p arthroscopy of L shoulder. Date of Injury/Surgery: surgery: 5/10/21  Treatment Diagnosis:     M62.81 muscle weakness,   Insurance/Certification information:   [employer]  Referring Physician:   Dr Alta Yeboah MD  Plan of care signed (Y/N):  elsa faxed  Outcome Measure: Quick DASH: 56.82  Visit# / total visits:  21/  Pain level: 3/10     Goals:     Short term goals to be achieved by 2021:  Short term goal 1: Pt will report compliance with current HEP as prescribed in order to improve ROM and strength. - MET, discharge goal.   Short term goal 2:  Pt will demonstrate AROM L shoulder flexion to at least 120 degrees in order to improve ROM  - MET, discharge goal.   Short term goal 3: Pt will demonstrate AROM L shoulder abduction to at least 100 degrees in order to improve ROM. - MET, discharge goal.   Short term goal 4: Pt will demonstrate AROM L UE supination to at least 80 degrees in order to improve ROM. - not met, continue. Short term goal 5: Pt will demonstrate L  strength to at least 75 lbs in order to improve strength. - not met, continue. Short term goal 6: Pt will demonstrate a score of no more than 15 on the Quick DASH in order to improve quality of life. - not met, continue. Subjective:  Patient rates his shoulder pain 3/10 today.      Any changes in Ambulatory Summary Sheet? no    Objective: NDI: 22.72  : L 50,45,55 AV lbs  R: 75,60,70 av.33 lbs  Shoulder:    Left    Flexion      125  degrees    ABduction       110 degrees    AROM  Elbow:     Left   Flexion        120 degrees   Extension       25 degree lag      Wrist:  Supination      70   degrees      COVID screening questions were asked and patient attested that there had been no contact or symptoms    Exercises: (No more than 4 columns)   Exercise/Equipment 8/5/2021 8/10/2021 Date: 8/12/21           WARM UP      Pulleys    20x5\" 20x5\" 20x5\"   Arm bike  2/2 F/B 2/2 F/B 2/2 F/B   [address] [facility] chair punch  AROM [place] chair position 2x10 w/small green ball  2x10 w/small green ball 2x15 small green ball   [address] [facility] chair position 2x10 w/small green ball   2x10 w/small green ball 2x15 small green ball   Beach chair shoulder circles  CW/CCW [place] chair position 2x10 w/small green ball  2x10 w/small green ball 2x15 small green ball   Sidelying ABD 2x10 with small green ball  2x15 small green ball   Sidelying ER 2x10 with small green ball  2x10 small green ball   Flex AAROM towel  3x10     sidelying horizontal ABD 2x10     Digiflex  9# 2x20     Black clips  Standing plinth at waist height reaching up to top of pole 4x     Seated AAROM ABD with towel on [address] chair flex w/cane  10x    Beach chair ABC's  1x through                                              [employer] Flexion  Large green ball on plinth 2x10     AAROM scaption  Large green ball on plinth 2x10     Shoulder circles CW/CCW Large green ball on plinth in standing shoulder flexion 90*  2x10 ea way      AAROM shoulder flex on wall. With towel hands clasped 10x   With roller flex 10x  scap 10x    PROPRIOCEPTION      Ball around waist   CW/CCW 2x10 CW/CCW 2x15   Cane ER at wall  2x10 5\" 2x10 5\"                     MODALITIES      Vaso              Other Therapeutic Activities/Education:  Pt educated on continuing HEP. Pt required min cueing and facilitation to perform correctly    Home Exercise Program:  Nothing new provided. Pt to continue current HEP.     Manual Treatments: none    Modalities:  None     Communication with other providers:   PT present     Assessment:  (Response towards treatment session) (Pain Rating) Patient reported pan 2/10 shld       Plan for Next Session:  Contact insurance for additional visits.      Time In / Time Out:  10:45-11:30 am    Timed Code/Total Treatment Minutes: 45'/  3 neuro re-ed    Next Progress Note due:  8/20/21    Plan of Care Interventions:  [x] Therapeutic Exercise  [] Modalities:  [x] Therapeutic Activity     [] Ultrasound  [] Estim  [] Gait Training      [] Cervical Traction [] Lumbar Traction  [x] Neuromuscular Re-education    [] Cold/hotpack [] Iontophoresis   [x] Instruction in HEP      [] Vasopneumatic   [] Dry Needling    [] Manual Therapy               [] Aquatic Therapy              Electronically signed by:  Sandi Rosario PT, DPT [id number]  8/12/2021, 11:01 AM

## 2021-08-16 ENCOUNTER — HOSPITAL ENCOUNTER (OUTPATIENT)
Dept: MRI IMAGING | Age: 66
Discharge: HOME | End: 2021-08-16
Payer: MEDICAID

## 2021-08-16 DIAGNOSIS — M54.2 NECK PAIN: ICD-10-CM

## 2021-08-16 PROCEDURE — 72141 MRI NECK SPINE W/O DYE: CPT

## 2021-09-09 ENCOUNTER — OFFICE VISIT (OUTPATIENT)
Dept: ORTHOPEDIC SURGERY | Age: 66
End: 2021-09-09
Payer: MEDICAID

## 2021-09-09 VITALS
RESPIRATION RATE: 16 BRPM | OXYGEN SATURATION: 97 % | BODY MASS INDEX: 33.79 KG/M2 | HEART RATE: 100 BPM | WEIGHT: 236 LBS | HEIGHT: 70 IN

## 2021-09-09 DIAGNOSIS — M47.22 CERVICAL SPONDYLOSIS WITH RADICULOPATHY: Primary | ICD-10-CM

## 2021-09-09 DIAGNOSIS — Z98.890 STATUS POST LEFT ROTATOR CUFF REPAIR: ICD-10-CM

## 2021-09-09 PROCEDURE — 3017F COLORECTAL CA SCREEN DOC REV: CPT | Performed by: ORTHOPAEDIC SURGERY

## 2021-09-09 PROCEDURE — G8427 DOCREV CUR MEDS BY ELIG CLIN: HCPCS | Performed by: ORTHOPAEDIC SURGERY

## 2021-09-09 PROCEDURE — G8417 CALC BMI ABV UP PARAM F/U: HCPCS | Performed by: ORTHOPAEDIC SURGERY

## 2021-09-09 PROCEDURE — 99214 OFFICE O/P EST MOD 30 MIN: CPT | Performed by: ORTHOPAEDIC SURGERY

## 2021-09-09 PROCEDURE — 1036F TOBACCO NON-USER: CPT | Performed by: ORTHOPAEDIC SURGERY

## 2021-09-09 ASSESSMENT — ENCOUNTER SYMPTOMS
WHEEZING: 0
CHEST TIGHTNESS: 0
EYE PAIN: 0
COLOR CHANGE: 0
SHORTNESS OF BREATH: 0
VOMITING: 0
EYE REDNESS: 0

## 2021-09-09 NOTE — PROGRESS NOTES
9/9/2021   Chief Complaint   Patient presents with    Shoulder Pain     left shoulder scope DOS 5/10/21    Neck Pain     MRiI cervical neck completed        History of Present Illness:                             José Luis Martinez is a 63 y.o. male who returns today for follow-up of his left shoulder rotator cuff repair and further discussion of his ongoing chronic worsening neck pain. He recently had his MRI performed of his cervical spine. His symptoms are unchanged. He continues to have stiffness throughout the neck and pain with rotational movements. He has trouble getting comfortable and has constant pain throughout the day and also at night. He has noticed some improvements with his strength and range of motion of the shoulder but does have difficulty with lifting pushing pulling. He has been making progress with physical therapy but has exhausted his visits. He is now doing a home exercise program.    Patient returns to the office for a 4 month post operative follow up on his left shoulder arthroscopy that was performed on 5/10/21 and to discuss recent MRI results of the cervical neck completed on 8/16/21.          Medical History  Patient's medications, allergies, past medical, surgical, social and family histories were reviewed and updated as appropriate. Review of Systems   Constitutional: Negative for chills and fever. HENT: Negative for congestion and sneezing. Eyes: Negative for pain and redness. Respiratory: Negative for chest tightness, shortness of breath and wheezing. Cardiovascular: Negative for chest pain and palpitations. Gastrointestinal: Negative for vomiting. Musculoskeletal: Positive for arthralgias, neck pain and neck stiffness. Skin: Negative for color change and rash. Neurological: Negative for weakness and numbness. Psychiatric/Behavioral: Negative for agitation. The patient is not nervous/anxious. Examination:  General Exam:  Vitals: Pulse 100   Resp 16   Ht 5' 10\" (1.778 m)   Wt 236 lb (107 kg)   SpO2 97%   BMI 33.86 kg/m²    Physical Exam  Vitals and nursing note reviewed. Constitutional:       Appearance: Normal appearance. HENT:      Head: Normocephalic and atraumatic. Eyes:      Conjunctiva/sclera: Conjunctivae normal.      Pupils: Pupils are equal, round, and reactive to light. Pulmonary:      Effort: Pulmonary effort is normal.   Musculoskeletal:      Right shoulder: No swelling, deformity, laceration, tenderness or bony tenderness. Normal range of motion. Normal strength. Right elbow: Normal.      Left elbow: Normal. No swelling, deformity, effusion or lacerations. Normal range of motion. No tenderness. Cervical back: Rigidity, spasms and tenderness present. No swelling, edema, deformity or erythema. Pain with movement present. Decreased range of motion. Comments: Left upper extremity:  Well-healed surgical scars over the shoulder. Good active range of motion of the shoulder with forward elevation of 120 degrees, abduction of 120 degrees and internal rotation to the sacroiliac region and external rotation of 30 degrees. Rotator cuff is functional and active against gravity and resistance with 5 out of 5 strength but mild discomfort and weakness compared to contralateral side. Sensation motor functions intact throughout the left upper extremity. Skin:     General: Skin is warm and dry. Neurological:      Mental Status: He is alert and oriented to person, place, and time.    Psychiatric:         Mood and Affect: Mood normal.         Behavior: Behavior normal.              Diagnostic testing:    MRI images of the cervical spine were reviewed by myself and discussed with the patient today:       MRI CERVICAL SPINE WO CONTRAST      Impression   Disc and osteophytes result in minimal narrowing of the neural foramina   throughout the cervical region as discussed above.  No stenosis of the thecal   sac in the cervical region.      Narrowing of the cervical spine canal at C5-6 with 4 mm of disc protrusion      Office Procedures:  Orders Placed This Encounter   Procedures    External Referral - Leonor Jacobo MD, Neurosurgery, [facility]     Referral Priority:   Routine     Referral Type:   Eval and Treat     Referral Reason:   Specialty Services Required     Referred to Provider:   Alphia Kocher, MD     Requested Specialty:   Neurosurgery     Number of Visits Requested:   1       Assessment and Plan  1. Cervical spondylosis multilevel    2. History of left rotator cuff repair    We discussed his recovery regarding his left rotator cuff repair. I have reassured him that he has good range of motion and strength and appears to be functioning well making regular improvements following his surgery. I have encouraged to continue with a home exercise program to continue making advancements in strength, endurance, and range of motion at the shoulder. He will follow-up in 3 months for check of his progress. We discussed his chronic neck pain and radiating discomfort into the scapular and shoulder areas. We reviewed the MRI images and I have reassured him that there is no evidence of severe or acute abnormality. We did discuss the multilevel degenerative changes with areas of mild narrowing of his cervical canal which may be responsible for his ongoing symptoms. He would like to have further evaluation of this by the spine physician and I have sent a referral to Dr. Marie Solis to review his MRI and determine whether further treatments for his cervical spine may be necessary.         Electronically signed by Jorge Cuba MD on 9/9/2021 at 10:23 AM

## 2021-09-09 NOTE — PATIENT INSTRUCTIONS
Referred to Dr. Manolo Soto for further evaluation of the neck  Continue to work on at on at home therapy exercises   May take Ibuprofen or Tylenol as needed  Rest, ice, and elevate as needed  Weightbearing and activities as tolerated  Follow up in 3 months

## 2021-09-09 NOTE — PROGRESS NOTES
Patient returns to the office for a 4 month post operative follow up on his left shoulder arthroscopy that was performed on 5/10/21 and to discuss recent MRI results of the cervical neck completed on 8/16/21. Pain is rated in office at a 4/10 with no improvement within the shoulder or neck. He continues to have stiffness, pain and tenderness within the neck and limited ROM within the shoulder. He has not returned to physical therapy due to insurance conflict. No new or worsening symptoms reported at this time. MRI CERVICAL SPINE WO CONTRAST     Impression   Disc and osteophytes result in minimal narrowing of the neural foramina   throughout the cervical region as discussed above.  No stenosis of the thecal   sac in the cervical region.

## 2021-10-26 ENCOUNTER — HOSPITAL ENCOUNTER (OUTPATIENT)
Dept: PHYSICAL THERAPY | Age: 66
Setting detail: THERAPIES SERIES
Discharge: HOME | End: 2021-10-26
Payer: MEDICAID

## 2021-10-26 PROCEDURE — 97162 PT EVAL MOD COMPLEX 30 MIN: CPT

## 2021-10-26 PROCEDURE — 97110 THERAPEUTIC EXERCISES: CPT

## 2021-10-26 ASSESSMENT — PAIN DESCRIPTION - ORIENTATION: ORIENTATION: RIGHT

## 2021-10-26 ASSESSMENT — PAIN SCALES - GENERAL: PAINLEVEL_OUTOF10: 7

## 2021-10-26 ASSESSMENT — PAIN DESCRIPTION - ONSET: ONSET: ON-GOING

## 2021-10-26 ASSESSMENT — PAIN DESCRIPTION - PAIN TYPE: TYPE: CHRONIC PAIN

## 2021-10-26 ASSESSMENT — PAIN DESCRIPTION - FREQUENCY: FREQUENCY: INTERMITTENT

## 2021-10-26 ASSESSMENT — PAIN DESCRIPTION - LOCATION: LOCATION: NECK

## 2021-10-26 ASSESSMENT — PAIN DESCRIPTION - PROGRESSION: CLINICAL_PROGRESSION: NOT CHANGED

## 2021-10-26 ASSESSMENT — PAIN - FUNCTIONAL ASSESSMENT: PAIN_FUNCTIONAL_ASSESSMENT: PREVENTS OR INTERFERES SOME ACTIVE ACTIVITIES AND ADLS

## 2021-10-26 NOTE — PROGRESS NOTES
Physical Therapy  Initial Assessment  Date: 10/26/2021  Patient Name: Cheri Mckoy  MRN: [id number]  : 1955     Treatment Diagnosis: neck pain    Restrictions  Position Activity Restriction  Other position/activity restrictions: none    Subjective   General  Chart Reviewed: Yes  Patient assessed for rehabilitation services?: Yes  Referring Practitioner: Chelly Sandhu  Diagnosis: M47.812- crvical spondylosis  Follows Commands: Within Functional Limits  PT Visit Information  PT Insurance Information: [facility] CP- needs pre cert  Subjective  Subjective: Ongoing  R sided neck pain since - ; insidious onset;constant  Pain Screening  Patient Currently in Pain: Yes  Pain Assessment  Pain Assessment: 0-10  Pain Level: 7  Pain Type: Chronic pain  Pain Location: Neck  Pain Orientation: Right  Pain Frequency: Intermittent  Pain Onset: On-going  Clinical Progression: Not changed  Functional Pain Assessment: Prevents or interferes some active activities and ADLs  Vital Signs  Patient Currently in Pain: Yes    Vision/Hearing  Vision  Vision: Impaired (glasses)  Hearing  Hearing: Within functional limits    Orientation  Orientation  Overall Orientation Status: Within Normal Limits    Social/Functional History  Social/Functional History  Lives With: Alone  ADL Assistance: Independent  Homemaking Assistance: Independent  Ambulation Assistance: Independent  Transfer Assistance: Independent  Active : Yes  Mode of Transportation: Car  Type of occupation: [employer]    Objective     Observation/Palpation  Posture: Fair  Palpation: B UT TTP  Observation: FWD head    Spine  Cervical: L/R ROT AROM to 60*    Strength RUE  Strength RUE: WFL  Strength LUE  Strength LUE: WFL     Additional Measures  Special Tests: L Spurling's test painful - no pain with R Spurlings                                             Assessment   Conditions Requiring Skilled Therapeutic Intervention  Body structures, Functions, Activity limitations: Decreased ROM; Increased pain;Decreased high-level IADLs  Assessment: NDI 18/50  Treatment Diagnosis: neck pain  Prognosis: Good  Decision Making: Medium Complexity  History: PF-disc space narrowing and anterior osteophyte formation at the C5-6 level  Exam: NDI/ ROM/ strength  Clinical Presentation: changing characteristics of function  Barriers to Learning: none  REQUIRES PT FOLLOW UP: Yes         Plan        G-Code       OutComes Score                                                  AM-PAC Score             Goals  Long term goals  Time Frame for Long term goals : 6 weeks  Long term goal 1: patient will be independent with HEP  Long term goal 2: patient will score 10/50 on NDI  Patient Goals   Patient goals : no neck pain       Therapy Time   Individual Concurrent Group Co-treatment   Time In 1020         Time Out 1100         Minutes 40         Timed Code Treatment Minutes: 20 Minutes       YAEL Reynolds, PT

## 2021-10-26 NOTE — PLAN OF CARE
Outpatient Physical Therapy           [place]           [] Phone: [phone]   Fax: [phone]  [facility]           [x] Phone: [phone]   Fax: [phone]     To: Referring Practitioner: Leoncio Kurtz    From: Sebastián Green, PT,    Patient: Ren Raines       : 1955  Diagnosis: Diagnosis: M47.812- crvical spondylosis   Treatment Diagnosis: Treatment Diagnosis: neck pain   Date: 10/26/2021    Physical Therapy Certification/Re-Certification Form    The following patient has been evaluated for physical therapy services and for therapy to continue, insurance requires physician review of the treatment plan initially and every 90 days. Please review the attached evaluation and/or summary of the patient's plan of care, and verify that you agree therapy should continue by signing the attached document and sending it back to our office.     Patient primary complaints: neck pain  History of condition: Ongoing  R sided neck pain since - ; insidious onset;constant  Current functional limitations:  Able to complete all necessary ADLs/IADLs with neck pain present  Clinical findings:NDI 18/50, neck ROT 60* R/L  PLOF:Pt was independent with ADLs/IADLs without significant pain or difficulties prior to July of this year  Patient's response to treatment:able to perform HEP, neck pain present @ end of session  Skilled PT interventions are intended to:  Decrease pain which will enable patient  to return to PLOF  Patient agrees with established plan of care and assisted in the development of their  goals  Barriers to learning:none- no mental/cognitive barriers observed  Preferred learning style(s):   written- demonstration -practice  Preferred Language: English  Potential barriers to progress:none  The patient appears motivated to participate in PT and regain PLOF: yesPlan of Care/Treatment to date:  [x] Therapeutic Exercise  [] Modalities:  [x] Therapeutic Activity     [] Ultrasound  [x] Electrical Stimulation  [] Gait Training      [x] Cervical Traction [] Lumbar Traction  [x] Neuromuscular Re-education    [x] Cold/hotpack [] Iontophoresis   [x] Instruction in HEP      [] Vasopneumatic    [] Dry Needling  [x] Manual Therapy               [] Aquatic Therapy     Frequency/Duration:  # Days per week: [] 1 day # Weeks: [] 1 week [] 5 weeks     [x] 2 days   [] 2 weeks [x] 6 weeks     [] 3 days   [] 3 weeks [] 7 weeks     [] 4 days   [] 4 weeks [] 8 weeks         [] 9 weeks [] 10 weeks         [] 11 weeks [] 12 weeks  Rehab Potential/Progress: [] Excellent [x] Good [] Fair  [] Poor   Goals:    Patient goals : no neck pain     Long term goals  Time Frame for Long term goals : 6 weeks  Long term goal 1: patient will be independent with HEP  Long term goal 2: patient will score 10/50 on NDI  Electronically signed by:  Leeanne Bhatt PT, PT, 10/26/2021, 5:50 PM  If you have any questions or concerns, please don't hesitate to call.   Thank you for your referral.      Physician Signature:________________________________Date:_________ TIME: _____  By signing above, therapists plan is approved by physician

## 2021-10-26 NOTE — FLOWSHEET NOTE
Outpatient Physical Therapy  [place]           [x] Phone: [phone]   Fax: [phone]  [facility]           [] Phone: [phone]   Fax: [phone]        Physical Therapy Daily Treatment Note  Date:  10/26/2021    Patient Name:  Yadira Silverman    :  1955  MRN: [id number]  Restrictions/Precautions:  Other position/activity restrictions: none  Diagnosis:   Diagnosis: M47.812- crvical spondylosis  Date of Injury/Surgery:   Treatment Diagnosis: Treatment Diagnosis: neck pain    Insurance/Certification information: PT Insurance Information: [facility] CP- needs pre cert   Referring Physician:  Referring Practitioner: Ant Trejo  Next Doctor Visit:    Plan of care signed (Y/N):    Outcome Measure:  NDI 18/50  Visit# / total visits:  1 /  Pain level: 710 R c- spine  Goals:     Patient goals : no neck pain     Long term goals  Time Frame for Long term goals : 6 weeks  Long term goal 1: patient will be independent with HEP  Long term goal 2: [facility]    ASSESSMENT  Patient primary complaints: neck pain  History of condition: Ongoing  R sided neck pain since - ; insidious onset;constant  Current functional limitations:  Able to complete all necessary ADLs/IADLs with neck pain present  Clinical findings:NDI 18/50, neck ROT 60* R/L  PLOF:Pt was independent with ADLs/IADLs without significant pain or difficulties prior to July of this year  Patient's response to treatment:able to perform HEP, neck pain present @ end of session  Skilled PT interventions are intended to:  Decrease pain which will enable patient  to return to PLOF  Patient agrees with established plan of care and assisted in the development of their  goals  Barriers to learning:none- no mental/cognitive barriers observed  Preferred learning style(s):   written- demonstration -practice  Preferred Language: English  Potential barriers to progress:none  The patient appears motivated to participate in PT and regain PLOF: yes    Subjective:  See eval         Any changes in Ambulatory Summary Sheet? None        Objective:  See eval   COVID screening questions were asked and patient attested that there had been no contact or symptoms        Exercises: (No more than 4 columns)   Exercise/Equipment Date10/26/21 Date Date      PT eval/ HEP instruct     WARM UP         UBE  F/B trial          TABLE      Manual therapy See below     chin tucks Supine and seated                          STANDING      LAE/ lat rows  Trial pink TT                                             PROPRIOCEPTION                                    MODALITIES                      Other Therapeutic Activities/Education:        Home Exercise Program:  Chin tucks/ cervical ROT      Manual Treatments:  Supine STM UT/ cervical spine distraction      Modalities:        Communication with other providers:        Assessment:  (Response towards treatment session) (Pain Rating)  Pt tolerated  treatment without any adverse reactions or complications this date.  . Pt would continue to benefit from skilled therapy interventions to address remaining impairments, improve mobility and strength,  and progress toward goal completion and prepare for d/c including finalizing HEP ;   Pain complaints after session 5/10    Plan for Next Session:        Time In / Time Out:    See elsa             Timed Code/Total Treatment Minutes:  21' TE x1/40' includes eval      Next Progress Note due:        Plan of Care Interventions:  [x] Therapeutic Exercise  [] Modalities:  [x] Therapeutic Activity     [] Ultrasound  [] Estim  [] Gait Training      [] Cervical Traction [] Lumbar Traction  [x] Neuromuscular Re-education    [x] Cold/hotpack [] Iontophoresis   [x] Instruction in HEP      [] Vasopneumatic   [] Dry Needling    [x] Manual Therapy               [] Aquatic Therapy              Electronically signed by:  Tiana John PT, 10/26/2021, 5:50 PM

## 2021-11-09 ENCOUNTER — HOSPITAL ENCOUNTER (OUTPATIENT)
Dept: PHYSICAL THERAPY | Age: 66
Setting detail: THERAPIES SERIES
Discharge: HOME | End: 2021-11-09
Payer: MEDICAID

## 2021-11-09 PROCEDURE — 97140 MANUAL THERAPY 1/> REGIONS: CPT

## 2021-11-09 PROCEDURE — 97110 THERAPEUTIC EXERCISES: CPT

## 2021-11-09 NOTE — FLOWSHEET NOTE
Outpatient Physical Therapy  [place]           [x] Phone: [phone]   Fax: [phone]  [place] [facility]           [] Phone: [phone]   Fax: [phone]        Physical Therapy Daily Treatment Note  Date:  2021    Patient Name:  Jackie Mcadams    :  1955  MRN: [id number]  Restrictions/Precautions:  Other position/activity restrictions: none  Diagnosis:   Diagnosis: M47.812- crvical spondylosis  Date of Injury/Surgery:   Treatment Diagnosis: Treatment Diagnosis: neck pain    Insurance/Certification information: PT Insurance Information: [facility] CP- needs pre cert   Referring Physician:  Referring Practitioner: Lissy Glaser  Next Doctor Visit:    Plan of care signed (Y/N):    Outcome Measure:  NDI 18/50  Visit# / total visits:  2 /  Pain level: 710 R c- spine  Goals:     Patient goals : no neck pain     Long term goals  Time Frame for Long term goals : 6 weeks  Long term goal 1: patient will be independent with HEP  Long term goal 2: patient will score 10/50 on [facility]    ASSESSMENT  Patient primary complaints: neck pain  History of condition: Ongoing  R sided neck pain since - ; insidious onset;constant  Current functional limitations:  Able to complete all necessary ADLs/IADLs with neck pain present  Clinical findings:NDI 18/50, neck ROT 60* R/L  PLOF:Pt was independent with ADLs/IADLs without significant pain or difficulties prior to July of this year  Patient's response to treatment:able to perform HEP, neck pain present @ end of session  Skilled PT interventions are intended to:  Decrease pain which will enable patient  to return to PLOF  Patient agrees with established plan of care and assisted in the development of their  goals  Barriers to learning:none- no mental/cognitive barriers observed  Preferred learning style(s):   written- demonstration -practice  Preferred Language: English  Potential barriers to progress:none  The patient appears motivated to participate in PT and regain PLOF: yes    Subjective:  Patient rates his neck pain 7/10. Is sore all over today from working on a roof yesterday. Any changes in Ambulatory Summary Sheet? None        Objective: Increased tightness noted in R UT  COVID screening questions were asked and patient attested that there had been no contact or symptoms        Exercises: (No more than 4 columns)   Exercise/Equipment Date10/26/21 Date  2021 Date      PT eval/ HEP instruct     WARM UP         UBE  F/B 2' ea           TABLE      Manual therapy See below See below    chin tucks Supine and seated Supine and seated 10x5\"                          STANDING      LAE/ lat rows  Pink TT 2x10                                             PROPRIOCEPTION                                    MODALITIES                      Other Therapeutic Activities/Education:        Home Exercise Program:  Chin tucks/ cervical ROT      Manual Treatments:  Supine STM UT/ cervical spine distraction      Modalities:        Communication with other providers:        Assessment:  (Response towards treatment session) (Pain Rating) Patient rated his neck pain 4/10 after treatment. Added above exercises with fair form and tolerance. Plan for Next Session:        Time In / Time Out:  1031/1109           Timed Code/Total Treatment Minutes:  45' (15' MT, 23' TE)  Insurance approved the followin              48 units  36075              48 units  90251              48 units  29033              48 units  89977              12 units    CPT Code Units today Running Total Units Total approved    TE [id number]  2 3 [address]  1 1 48   Gait [id number]      NR 85619   48   TA  30182   48   Estim Unatt [address]      [facility] [place] [address]      PNGK 86353    12   ADL/Self care 90938      DNT 1-2 73018      DNT 3-4       Other:                 Total for episode of care            Date range: 10/27/2021 thru 2022     Next Progress Note due:        Plan of Care Interventions:  [x] Therapeutic Exercise  [] Modalities:  [x] Therapeutic Activity     [] Ultrasound  [] Estim  [] Gait Training      [] Cervical Traction [] Lumbar Traction  [x] Neuromuscular Re-education    [x] Cold/hotpack [] Iontophoresis   [x] Instruction in HEP      [] Vasopneumatic   [] Dry Needling    [x] Manual Therapy               [] Aquatic Therapy              Electronically signed by:  Yash Chiang PTA/Ilsa Tiwari PT  11/9/2021, 10:31 AM

## 2021-11-12 ENCOUNTER — HOSPITAL ENCOUNTER (OUTPATIENT)
Dept: PHYSICAL THERAPY | Age: 66
Setting detail: THERAPIES SERIES
Discharge: HOME | End: 2021-11-12
Payer: MEDICAID

## 2021-11-12 PROCEDURE — 97140 MANUAL THERAPY 1/> REGIONS: CPT

## 2021-11-12 PROCEDURE — 97110 THERAPEUTIC EXERCISES: CPT

## 2021-11-12 NOTE — FLOWSHEET NOTE
Outpatient Physical Therapy  [place]           [x] Phone: [phone]   Fax: [phone]  Clarissa Marquez           [] Phone: [phone]   Fax: [phone]        Physical Therapy Daily Treatment Note  Date:  2021    Patient Name:  Braxton Epps    :  1955  MRN: [id number]  Restrictions/Precautions:  Other position/activity restrictions: none  Diagnosis:   Diagnosis: M47.812- crvical spondylosis  Date of Injury/Surgery:   Treatment Diagnosis: Treatment Diagnosis: neck pain    Insurance/Certification information: PT Insurance Information: [facility] CP- needs pre cert   Referring Physician:  Referring Practitioner: Rubina Cruz  Next Doctor Visit:    Plan of care signed (Y/N):    Outcome Measure:  NDI 18/50  Visit# / total visits:  3/  Pain level: 3-4/10 R c- spine  Goals:     Patient goals : no neck pain     Long term goals  Time Frame for Long term goals : 6 weeks  Long term goal 1: patient will be independent with HEP  Long term goal 2: [facility]    ASSESSMENT  Patient primary complaints: neck pain  History of condition: Ongoing  R sided neck pain since - ; insidious onset;constant  Current functional limitations:  Able to complete all necessary ADLs/IADLs with neck pain present  Clinical findings:NDI 18/50, neck ROT 60* R/L  PLOF:Pt was independent with ADLs/IADLs without significant pain or difficulties prior to July of this year  Patient's response to treatment:able to perform HEP, neck pain present @ end of session  Skilled PT interventions are intended to:  Decrease pain which will enable patient  to return to PLOF  Patient agrees with established plan of care and assisted in the development of their  goals  Barriers to learning:none- no mental/cognitive barriers observed  Preferred learning style(s):   written- demonstration -practice  Preferred Language: English  Potential barriers to progress:none  The patient appears motivated to participate in PT and regain PLOF: yes    Subjective:  Patient reports of 3-4/10 pain in his neck upon arrival and reports later after his last treatment his neck started to bother him more which lasted into the next day. Any changes in Ambulatory Summary Sheet? None        Objective:   Tightness in Nigel sub occipital area with discomfort    COVID screening questions were asked and patient attested that there had been no contact or symptoms        Exercises: (No more than 4 columns)   Exercise/Equipment  Date  2021 Date 21      PT eval/ HEP instruct     WARM UP         UBE  F/B 2' ea  F/B 2/         TABLE      Manual therapy See below See below See below   chin tucks Supine and seated Supine and seated 10x5\"  Supine and seated 10x5\"                        STANDING      LAE/ lat rows  Pink TT 2x10 Pink TT 2x10                                            PROPRIOCEPTION                                    MODALITIES                      Other Therapeutic Activities/Education:        Home Exercise Program:  Chin tucks/ cervical ROT      Manual Treatments:  Supine STM UT/ cervical spine distraction      Modalities:        Communication with other providers:        Assessment:  (Response towards treatment session) (Pain Rating)  Patient reports of 2/10 pain at end of session and did well with therapy today. Plan for Next Session:        Time In / Time Out:      Timed Code/Total Treatment Minutes:  30 66HI 33WXO   [id number] approved the followin              48 units  26008              48 units  85946              48 units  00347              48 units  01344              12 units    CPT Code Units today Running Total Units Total approved    TE 90828  4 4 86   MRU 04096  2 3 48   Gait 51584      NR 78972   17   TA  11920   48   Estim Unatt 75495       AG [address]      [facility] [place] [address]      BSNT 82586    12   ADL/Self care 10313      DNT 1-2 71772      DNT 3-4       Other:                 Total for episode of care            Date range: 10/27/2021 thru 01/25/2022     Next Progress Note due:        Plan of Care Interventions:  [x] Therapeutic Exercise  [] Modalities:  [x] Therapeutic Activity     [] Ultrasound  [] Estim  [] Gait Training      [] Cervical Traction [] Lumbar Traction  [x] Neuromuscular Re-education    [x] Cold/hotpack [] Iontophoresis   [x] Instruction in HEP      [] Vasopneumatic   [] Dry Needling    [x] Manual Therapy               [] Aquatic Therapy              Electronically signed by:  Ruby Melvin PTA   11/12/2021, 9:49 AM

## 2021-11-16 ENCOUNTER — HOSPITAL ENCOUNTER (OUTPATIENT)
Dept: PHYSICAL THERAPY | Age: 66
Setting detail: THERAPIES SERIES
Discharge: HOME | End: 2021-11-16
Payer: MEDICAID

## 2021-11-16 PROCEDURE — 97140 MANUAL THERAPY 1/> REGIONS: CPT

## 2021-11-16 PROCEDURE — 97110 THERAPEUTIC EXERCISES: CPT

## 2021-11-16 NOTE — FLOWSHEET NOTE
Outpatient Physical Therapy  [place]           [x] Phone: [phone]   Fax: [phone]  [place]           [] Phone: [phone]   Fax: [phone]        Physical Therapy Daily Treatment Note  Date:  2021    Patient Name:  Yadira Silverman    :  1955  MRN: [id number]  Restrictions/Precautions:  Other position/activity restrictions: none  Diagnosis:   Diagnosis: M47.812- crvical spondylosis  Date of Injury/Surgery:   Treatment Diagnosis: Treatment Diagnosis: neck pain    Insurance/Certification information: PT Insurance Information: [facility] CP- needs pre cert   Referring Physician:  Referring Practitioner: Ant Hunt Doctor Visit:    Plan of care signed (Y/N):    Outcome Measure:  NDI 18/50  Visit# / total visits:  4/  Pain level: 4/10 R c- spine  Goals:     Patient goals : no neck pain     Long term goals  Time Frame for Long term goals : 6 weeks  Long term goal 1: patient will be independent with HEP  Long term goal 2: [facility]    ASSESSMENT  Patient primary complaints: neck pain  History of condition: Ongoing  R sided neck pain since - ; insidious onset;constant  Current functional limitations:  Able to complete all necessary ADLs/IADLs with neck pain present  Clinical findings:NDI 18/50, neck ROT 60* R/L  PLOF:Pt was independent with ADLs/IADLs without significant pain or difficulties prior to July of this year  Patient's response to treatment:able to perform HEP, neck pain present @ end of session  Skilled PT interventions are intended to:  Decrease pain which will enable patient  to return to PLOF  Patient agrees with established plan of care and assisted in the development of their  goals  Barriers to learning:none- no mental/cognitive barriers observed  Preferred learning style(s):   written- demonstration -practice  Preferred Language: English  Potential barriers to progress:none  The patient appears motivated to participate in PT and regain PLOF: yes    Subjective: Patient rates his pain 4/10 in the right C spine today. Any changes in Ambulatory Summary Sheet? None        Objective:   Decreased tightness in B UT with massage. COVID screening questions were asked and patient attested that there had been no contact or symptoms        Exercises: (No more than 4 columns)   Exercise/Equipment  Date  2021 Date 21      PT eval/ HEP instruct      WARM UP          UBE  F/B 2' ea  F/B  F/B           TABLE       Manual therapy See below See below See below See Below   chin tucks Supine and seated Supine and seated 10x5\"  Supine and seated 10x5\" Supine and seated 10x5\"                           STANDING       LAE/ lat rows  Pink TT 2x10 Pink TT 2x10 Pink TT 2x10                                                  PROPRIOCEPTION                                          MODALITIES                         Other Therapeutic Activities/Education:        Home Exercise Program:  Chin tucks/ cervical ROT      Manual Treatments:  Supine STM UT/ cervical spine distraction      Modalities:        Communication with other providers:        Assessment:  (Response towards treatment session) (Pain Rating)  Patient reports of 2/10 pain at end of session. Noted most relief with the cervical distraction      Plan for Next Session:        Time In / Time Out:  817/850    Timed Code/Total Treatment Minutes:  35 15te 25 man   Insurance approved the followin              48 units  81394              48 units  40841              48 units  58682              48 units  02330              12 units    CPT Code Units today Running Total Units Total approved    TE 42667  4 7 93   JUG 15291  2 4 48   Gait 87432      NR 83228   48   TA  [address]      [facility] [place] [address]      AWHR 14605    12   ADL/Self care 73755      DNT 1-2 34859      DNT 3-4       Other:                 Total for episode of care Date range: 10/27/2021 thru 01/25/2022     Next Progress Note due:        Plan of Care Interventions:  [x] Therapeutic Exercise  [] Modalities:  [x] Therapeutic Activity     [] Ultrasound  [] Estim  [] Gait Training      [] Cervical Traction [] Lumbar Traction  [x] Neuromuscular Re-education    [x] Cold/hotpack [] Iontophoresis   [x] Instruction in HEP      [] Vasopneumatic   [] Dry Needling    [x] Manual Therapy               [] Aquatic Therapy              Electronically signed by:  Elizabeth Lara PTA   11/16/2021, 8:17 AM

## 2021-11-18 ENCOUNTER — HOSPITAL ENCOUNTER (OUTPATIENT)
Dept: PHYSICAL THERAPY | Age: 66
Setting detail: THERAPIES SERIES
Discharge: HOME | End: 2021-11-18
Payer: MEDICAID

## 2021-11-18 PROCEDURE — 97110 THERAPEUTIC EXERCISES: CPT

## 2021-11-18 PROCEDURE — 97140 MANUAL THERAPY 1/> REGIONS: CPT

## 2021-11-18 NOTE — FLOWSHEET NOTE
Outpatient Physical Therapy  [place]           [x] Phone: [phone]   Fax: [phone]  Irene Gerardokevin           [] Phone: [phone]   Fax: [phone]        Physical Therapy Daily Treatment Note  Date:  2021    Patient Name:  Johnathan Connors    :  1955  MRN: [id number]  Restrictions/Precautions:  Other position/activity restrictions: none  Diagnosis:   Diagnosis: M47.812- crvical spondylosis  Date of Injury/Surgery:   Treatment Diagnosis: Treatment Diagnosis: neck pain    Insurance/Certification information: PT Insurance Information: [facility] CP- needs pre cert   Referring Physician:  Referring Practitioner: Johanna Castellanos  Next Doctor Visit:    Plan of care signed (Y/N):    Outcome Measure:  NDI 18  Visit# / total visits:  5/  Pain level: 4/10 R c- spine  Goals:     Patient goals : no neck pain     Long term goals  Time Frame for Long term goals : 6 weeks  Long term goal 1: patient will be independent with HEP  Long term goal 2: [facility]    ASSESSMENT  Patient primary complaints: neck pain  History of condition: Ongoing  R sided neck pain since - ; insidious onset;constant  Current functional limitations:  Able to complete all necessary ADLs/IADLs with neck pain present  Clinical findings:NDI 18/50, neck ROT 60* R/L  PLOF:Pt was independent with ADLs/IADLs without significant pain or difficulties prior to July of this year  Patient's response to treatment:able to perform HEP, neck pain present @ end of session  Skilled PT interventions are intended to:  Decrease pain which will enable patient  to return to PLOF  Patient agrees with established plan of care and assisted in the development of their  goals  Barriers to learning:none- no mental/cognitive barriers observed  Preferred learning style(s):   written- demonstration -practice  Preferred Language: English  Potential barriers to progress:none  The patient appears motivated to participate in PT and regain PLOF: yes    Subjective: Patient rates his pain 4/10 in the right C spine today. Any changes in Ambulatory Summary Sheet? None        Objective:   Decreased tightness in B UT with massage. COVID screening questions were asked and patient attested that there had been no contact or symptoms        Exercises: (No more than 4 columns)   Exercise/Equipment  Date  2021 Date 21      PT eval/ HEP instruct      WARM UP          UBE  F/B 2' ea  F/B  F/B           TABLE       Manual therapy See below See below See below See Below   chin tucks Supine and seated Supine and seated 10x5\"  Supine and seated 10x5\" Supine and seated 2x10x5\"                           STANDING       LAE/ lat rows  Pink TT 2x10 Pink TT 2x10 Pink TT 2x10                                                  PROPRIOCEPTION                                          MODALITIES                         Other Therapeutic Activities/Education:        Home Exercise Program:  Chin tucks/ cervical ROT      Manual Treatments:  Supine STM UT/ cervical spine distraction      Modalities:        Communication with other providers:        Assessment:  (Response towards treatment session) (Pain Rating)  Patient reports of 2/10 pain at end of session. Noted most relief with the cervical distraction      Plan for Next Session:        Time In / Time Out:  08/    Timed Code/Total Treatment Minutes:  36 15te 25 man   Insurance approved the followin              48 units  15469              48 units  84312              48 units  26892              48 units  53093              12 units    CPT Code Units today Running Total Units Total approved    TE 96377  2 8 93   FWU 34290  2 6 48   Gait 65458      NR 15239   48   TA  [address]      [facility] [place] [address]      [facility] [address]    12   ADL/Self care 91136      DNT 1-2       DNT 3-4       Other:                 Total for episode of care Date range: 10/27/2021 thru 01/25/2022     Next Progress Note due:        Plan of Care Interventions:  [x] Therapeutic Exercise  [] Modalities:  [x] Therapeutic Activity     [] Ultrasound  [] Estim  [] Gait Training      [] Cervical Traction [] Lumbar Traction  [x] Neuromuscular Re-education    [x] Cold/hotpack [] Iontophoresis   [x] Instruction in HEP      [] Vasopneumatic   [] Dry Needling    [x] Manual Therapy               [] Aquatic Therapy              Electronically signed by:  Zac Peralta PTA/Ilsa Tiwari PT   11/18/2021, 8:30 AM

## 2021-11-23 ENCOUNTER — HOSPITAL ENCOUNTER (OUTPATIENT)
Dept: PHYSICAL THERAPY | Age: 66
Setting detail: THERAPIES SERIES
Discharge: HOME | End: 2021-11-23
Payer: MEDICAID

## 2021-11-23 PROCEDURE — 97110 THERAPEUTIC EXERCISES: CPT

## 2021-11-23 PROCEDURE — 97140 MANUAL THERAPY 1/> REGIONS: CPT

## 2021-11-23 NOTE — FLOWSHEET NOTE
Outpatient Physical Therapy  [place]           [x] Phone: [phone]   Fax: [phone]  [place]           [] Phone: [phone]   Fax: [phone]        Physical Therapy Daily Treatment Note  Date:  2021    Patient Name:  Jayashree Multani    :  1955  MRN: [id number]  Restrictions/Precautions:  Other position/activity restrictions: none  Diagnosis:   Diagnosis: M47.812- crvical spondylosis  Date of Injury/Surgery:   Treatment Diagnosis: Treatment Diagnosis: neck pain    Insurance/Certification information: PT Insurance Information: [facility] CP- needs pre cert   Referring Physician:  Referring Practitioner: Tamara Kessler  Next Doctor Visit:    Plan of care signed (Y/N):    Outcome Measure:  NDI 18/50  Visit# / total visits:  5/  Pain level: 8/10 R c- spine  Goals:     Patient goals : no neck pain     Long term goals  Time Frame for Long term goals : 6 weeks  Long term goal 1: patient will be independent with HEP  Long term goal 2: [facility]    ASSESSMENT  Patient primary complaints: neck pain  History of condition: Ongoing  R sided neck pain since - ; insidious onset;constant  Current functional limitations:  Able to complete all necessary ADLs/IADLs with neck pain present  Clinical findings:NDI 18/50, neck ROT 60* R/L  PLOF:Pt was independent with ADLs/IADLs without significant pain or difficulties prior to July of this year  Patient's response to treatment:able to perform HEP, neck pain present @ end of session  Skilled PT interventions are intended to:  Decrease pain which will enable patient  to return to PLOF  Patient agrees with established plan of care and assisted in the development of their  goals  Barriers to learning:none- no mental/cognitive barriers observed  Preferred learning style(s):   written- demonstration -practice  Preferred Language: English  Potential barriers to progress:none  The patient appears motivated to participate in PT and regain PLOF: yes    Subjective: Pt reports to therapy with a headache and more pain in his neck than usual. States that he had an unusual pain in his abdomen and went to the hospital yesterday. Checked his gallbladder but all tests came back negative so they are sending him to a specialist.         Any changes in Ambulatory Summary Sheet? None        Objective: Increased tightness noted in L UT    COVID screening questions were asked and patient attested that there had been no contact or symptoms        Exercises: (No more than 4 columns)   Exercise/Equipment Date:  21 Date:   2021 Date:   21           WARM UP         UBE F/B 2/2 F/B 2/2 F/B 2/         TABLE      Manual therapy See below See Below    chin tucks Supine and seated 10x5\" Supine and seated 2x10x5\" Supine 2x10x5\"                        STANDING      LAE/ lat rows Pink TT 2x10 Pink TT 2x10 Pink TT 2x10                                            PROPRIOCEPTION                                    MODALITIES                      Other Therapeutic Activities/Education:        Home Exercise Program:  Chin tucks/ cervical ROT      Manual Treatments:  Supine STM UT/ cervical spine distraction      Modalities:        Communication with other providers:        Assessment:  (Response towards treatment session) (Pain Rating)  Patient rated his pain 3-4/10 in the neck and HA after treatment.         Plan for Next Session:        Time In / Time Out:  8043/6771    Timed Code/Total Treatment Minutes:    Insurance approved the followin              48 units  30769              48 units  31733              48 units  31391              36 units  71436              12 units    CPT Code Units today Running Total Units Total approved    TE 57425  9 5 24   JCR 81757  1 7 48   Gait 08444      NR 39942   48   TA  37457   48   Estim Unatt 41055       OE [address]      [facility] [place] [address]      UOUU 96782    12   ADL/Self care 13993      DNT 1-2 50106      DNT 3-4 37824 Other:                Total for episode of care            Date range: 10/27/2021 thru 01/25/2022     Next Progress Note due:        Plan of Care Interventions:  [x] Therapeutic Exercise  [] Modalities:  [x] Therapeutic Activity     [] Ultrasound  [] Estim  [] Gait Training      [] Cervical Traction [] Lumbar Traction  [x] Neuromuscular Re-education    [x] Cold/hotpack [] Iontophoresis   [x] Instruction in HEP      [] Vasopneumatic   [] Dry Needling    [x] Manual Therapy               [] Aquatic Therapy              Electronically signed by:  Quintin Primrose, PTA/ Kiran Alert, SPTA   11/23/2021, 9:02 AM

## 2021-11-30 ENCOUNTER — HOSPITAL ENCOUNTER (OUTPATIENT)
Dept: PHYSICAL THERAPY | Age: 66
Setting detail: THERAPIES SERIES
Discharge: HOME | End: 2021-11-30
Payer: MEDICAID

## 2021-11-30 PROCEDURE — 97110 THERAPEUTIC EXERCISES: CPT

## 2021-11-30 PROCEDURE — 97140 MANUAL THERAPY 1/> REGIONS: CPT

## 2021-11-30 NOTE — FLOWSHEET NOTE
Outpatient Physical Therapy  [place]           [x] Phone: [phone]   Fax: [phone]  [place] [place]           [] Phone: [phone]   Fax: [phone]        Physical Therapy Daily Treatment Note  Date:  2021    Patient Name:  Brenita Burkitt    :  1955  MRN: [id number]  Restrictions/Precautions:  Other position/activity restrictions: none  Diagnosis:   Diagnosis: M47.812- crvical spondylosis  Date of Injury/Surgery:   Treatment Diagnosis: Treatment Diagnosis: neck pain    Insurance/Certification information: PT Insurance Information: [facility] CP- needs pre cert   Referring Physician:  Referring Practitioner: Denys Gudino  Next Doctor Visit:    Plan of care signed (Y/N):    Outcome Measure:  NDI 18/50  Visit# / total visits:  7/  Pain level: 3-4/10 R c- spine  Goals:     Patient goals : no neck pain     Long term goals  Time Frame for Long term goals : 6 weeks  Long term goal 1: patient will be independent with HEP  Long term goal 2: [facility]    ASSESSMENT  Patient primary complaints: neck pain  History of condition: Ongoing  R sided neck pain since - ; insidious onset;constant  Current functional limitations:  Able to complete all necessary ADLs/IADLs with neck pain present  Clinical findings:NDI 18/50, neck ROT 60* R/L  PLOF:Pt was independent with ADLs/IADLs without significant pain or difficulties prior to July of this year  Patient's response to treatment:able to perform HEP, neck pain present @ end of session  Skilled PT interventions are intended to:  Decrease pain which will enable patient  to return to PLOF  Patient agrees with established plan of care and assisted in the development of their  goals  Barriers to learning:none- no mental/cognitive barriers observed  Preferred learning style(s):   written- demonstration -practice  Preferred Language: English  Potential barriers to progress:none  The patient appears motivated to participate in PT and regain PLOF: yes    Subjective: Patient was unable to attend his appointment this morning, but was able to be rescheduled this afternoon. States that his pain is 3-4/10 in the neck. Continues to have difficulty with sleeping. Any changes in Ambulatory Summary Sheet? None        Objective:   Suboccipital tightness on the right continues    COVID screening questions were asked and patient attested that there had been no contact or symptoms        Exercises: (No more than 4 columns)   Exercise/Equipment Date:  21 Date:   2021 Date:   21            WARM UP          UBE F/B 2/2 F/B 2/2 F/B 2/ F/B 2          TABLE       Manual therapy See below See Below     chin tucks Supine and seated 10x5\" Supine and seated 2x10x5\" Supine 2x10x5\" Supine 2x10x5\"                           STANDING       LAE/ lat rows Pink TT 2x10 Pink TT 2x10 Pink TT 2x10 Pink TT 2x10                                                  PROPRIOCEPTION                                          MODALITIES                         Other Therapeutic Activities/Education:        Home Exercise Program:  Chin tucks/ cervical ROT      Manual Treatments:  Supine STM UT/ cervical spine distraction      Modalities:        Communication with other providers:        Assessment:  (Response towards treatment session) (Pain Rating)  Patient rated his pain 2/10 after treatment. Decreased tightness noted in B UT, but continues to have tightness in R suboccipitals. Getting some pain relief with treatment, but not having much carryover.        Plan for Next Session:        Time In / Time Out:  1340/1415    Timed Code/Total Treatment Minutes:  28' ( 15' TE, 20' MT)  Insurance approved the followin              48 units  78536              48 units  23469              48 units  74193              48 units  51507              12 units    CPT Code Units today Running Total Units Total approved    TE 43037  9 9 72   LYO 73826  4 1 58 Gait 21002      NR 45927   48   TA  34784   . [facility] [address] [place] [id number]      [facility] [address]    12   ADL/Self care 07978      DNT 1-2 20560      DNT 3-4 20561      Other:                 Total for episode of care            Date range: 10/27/2021 thru 01/25/2022     Next Progress Note due:        Plan of Care Interventions:  [x] Therapeutic Exercise  [] Modalities:  [x] Therapeutic Activity     [] Ultrasound  [] Estim  [] Gait Training      [] Cervical Traction [] Lumbar Traction  [x] Neuromuscular Re-education    [x] Cold/hotpack [] Iontophoresis   [x] Instruction in HEP      [] Vasopneumatic   [] Dry Needling    [x] Manual Therapy               [] Aquatic Therapy              Electronically signed by:  Morelia Luz PTA     11/30/2021, 1:41 PM

## 2021-11-30 NOTE — FLOWSHEET NOTE
Physical Therapy  Cancellation/No-show Note  Patient Name:  Yadira Andra  :  1955   Date:  2021  Cancelled visits to date: 1  No-shows to date: 0    For today's appointment patient:  [x]  Cancelled  []  Rescheduled appointment  []  No-show     Reason given by patient:  []  Patient ill  []  Conflicting appointment  []  No transportation    []  Conflict with work  []  No reason given  [x]  Other:  Something came up   Comments:      Electronically signed by:  Ashlee Ahn PTA

## 2021-12-02 ENCOUNTER — HOSPITAL ENCOUNTER (OUTPATIENT)
Dept: PHYSICAL THERAPY | Age: 66
Setting detail: THERAPIES SERIES
Discharge: HOME | End: 2021-12-02
Payer: MEDICAID

## 2021-12-02 PROCEDURE — 97140 MANUAL THERAPY 1/> REGIONS: CPT

## 2021-12-02 PROCEDURE — 97110 THERAPEUTIC EXERCISES: CPT

## 2021-12-02 NOTE — FLOWSHEET NOTE
Outpatient Physical Therapy  [place]           [x] Phone: [phone]   Fax: [phone]  [place]           [] Phone: [phone]   Fax: [phone]        Physical Therapy Daily Treatment Note  Date:  2021    Patient Name:  Humberto Rene    :  1955  MRN: [id number]  Restrictions/Precautions:  Other position/activity restrictions: none  Diagnosis:   Diagnosis: M47.812- cervical spondylosis  Date of Injury/Surgery:   Treatment Diagnosis: Treatment Diagnosis: neck pain    Insurance/Certification information: PT Insurance Information: [facility] CP- needs pre cert   Referring Physician:  Referring Practitioner: Jon Montoya  Next Doctor Visit:    Plan of care signed (Y/N):    Outcome Measure:  NDI 18/50  Visit# / total visits:  8/  Pain level: 2/10 R c- spine  Goals:     Patient goals : no neck pain     Long term goals  Time Frame for Long term goals : 6 weeks  Long term goal 1: patient will be independent with HEP  Long term goal 2: [facility]    ASSESSMENT  Patient primary complaints: neck pain  History of condition: Ongoing  R sided neck pain since - ; insidious onset;constant  Current functional limitations:  Able to complete all necessary ADLs/IADLs with neck pain present  Clinical findings:NDI 18/50, neck ROT 60* R/L  PLOF:Pt was independent with ADLs/IADLs without significant pain or difficulties prior to July of this year  Patient's response to treatment:able to perform HEP, neck pain present @ end of session  Skilled PT interventions are intended to:  Decrease pain which will enable patient  to return to PLOF  Patient agrees with established plan of care and assisted in the development of their  goals  Barriers to learning:none- no mental/cognitive barriers observed  Preferred learning style(s):   written- demonstration -practice  Preferred Language: English  Potential barriers to progress:none  The patient appears motivated to participate in PT and regain PLOF: yes    Subjective: Pt has minimal pain but states that he's sleeping 2-3 hours a night and switches between his bed and couch at night. Any changes in Ambulatory Summary Sheet? None      Objective: All ex's tolerated well. COVID screening questions were asked and patient attested that there had been no contact or symptoms        Exercises: (No more than 4 columns)   Exercise/Equipment Date:   21           WARM UP         UBE F/B 2/2 F/B / F/B 3/3         TABLE      Manual therapy   See below    chin tucks Supine 2x10x5\" Supine 2x10x5\" Supine 3x10x5\"                        STANDING      LAE/ lat rows Pink TT 2x10 Pink TT 2x10 Pink TT 3x10                                            PROPRIOCEPTION                                    MODALITIES                      Other Therapeutic Activities/Education:        Home Exercise Program:  Chin tucks/ cervical ROT      Manual Treatments:  Supine STM UT/ cervical spine distraction      Modalities:        Communication with other providers:        Assessment:  (Response towards treatment session) (Pain Rating) Pt tolerated rx well and had no c/o pain during ex's. Pain 2/10 at end of rx. Plan for Next Session:        Time In / Time Out:  1355/1425    Timed Code/Total Treatment Minutes: 30', 15man, 15te  Insurance approved the followin              50 units  11820              48 units  40872              48 units  99770              90 units  93545              12 units    CPT Code Units today Running Total Units Total approved    TE 36497  1 8 62   RGG 58267  1 8 48   Gait 54086      NR 17160   48   TA  20548   48   Estim Unatt [address]      [facility] [place] [address]      QEEP 19659    12   ADL/Self care 88701      DNT 1-2 04814      DNT 3-4       Other:                 Total for episode of care            Date range: 10/27/2021 thru 2022     Next Progress Note due:        Plan of Care Interventions:  [x] Therapeutic Exercise  [] Modalities:  [x] Therapeutic Activity     [] Ultrasound  [] Estim  [] Gait Training      [] Cervical Traction [] Lumbar Traction  [x] Neuromuscular Re-education    [x] Cold/hotpack [] Iontophoresis   [x] Instruction in HEP      [] Vasopneumatic   [] Dry Needling    [x] Manual Therapy               [] Aquatic Therapy              Electronically signed by:  Quintin Primrose, PTA/Klarissa Weston, [address] [place] [address]  12/2/2021, 1:55 PM

## 2021-12-07 ENCOUNTER — HOSPITAL ENCOUNTER (OUTPATIENT)
Dept: PHYSICAL THERAPY | Age: 66
Setting detail: THERAPIES SERIES
Discharge: HOME | End: 2021-12-07
Payer: MEDICAID

## 2021-12-07 PROCEDURE — 97140 MANUAL THERAPY 1/> REGIONS: CPT

## 2021-12-07 PROCEDURE — 97110 THERAPEUTIC EXERCISES: CPT

## 2021-12-07 NOTE — FLOWSHEET NOTE
Outpatient Physical Therapy  [place]           [x] Phone: [phone]   Fax: [phone]  Frannie Maher           [] Phone: [phone]   Fax: [phone]        Physical Therapy Daily Treatment Note  Date:  2021    Patient Name:  Ana Mena    :  1955  MRN: [id number]  Restrictions/Precautions:  Other position/activity restrictions: none  Diagnosis:   Diagnosis: M47.812- crvical spondylosis  Date of Injury/Surgery:   Treatment Diagnosis: Treatment Diagnosis: neck pain    Insurance/Certification information: PT Insurance Information: [facility] CP- needs pre cert   Referring Physician:  Referring Practitioner: Karthik Hunt Doctor Visit:    Plan of care signed (Y/N):    Outcome Measure:  NDI 18/50  Visit# / total visits:  8/  Pain level: 3/10 R c- spine  Goals:     Patient goals : no neck pain     Long term goals  Time Frame for Long term goals : 6 weeks  Long term goal 1: patient will be independent with HEP  Long term goal 2: patient will [facility]    ASSESSMENT  Patient primary complaints: neck pain  History of condition: Ongoing  R sided neck pain since - ; insidious onset;constant  Current functional limitations:  Able to complete all necessary ADLs/IADLs with neck pain present  Clinical findings:NDI 18/50, neck ROT 60* R/L  PLOF:Pt was independent with ADLs/IADLs without significant pain or difficulties prior to July of this year  Patient's response to treatment:able to perform HEP, neck pain present @ end of session  Skilled PT interventions are intended to:  Decrease pain which will enable patient  to return to PLOF  Patient agrees with established plan of care and assisted in the development of their  goals  Barriers to learning:none- no mental/cognitive barriers observed  Preferred learning style(s):   written- demonstration -practice  Preferred Language: English  Potential barriers to progress:none  The patient appears motivated to participate in PT and regain PLOF: yes    Subjective: Patient rates his pain 3/10 today. Any changes in Ambulatory Summary Sheet? None        Objective:  Decreased overall tightness in B UT today    COVID screening questions were asked and patient attested that there had been no contact or symptoms        Exercises: (No more than 4 columns)   Exercise/Equipment Date:   21           WARM UP         UBE F/B 2/2 F/B 2/2 F/B 2/2         TABLE      Manual therapy      chin tucks Supine 2x10x5\" Supine 2x10x5\" Supine 2x10x5\"                        STANDING      LAE/ lat rows Pink TT 2x10 Pink TT 2x10 Pink TT 2x10                                            PROPRIOCEPTION                                    MODALITIES                      Other Therapeutic Activities/Education:        Home Exercise Program:  Chin tucks/ cervical ROT      Manual Treatments:  Supine STM UT/ cervical spine distraction      Modalities:        Communication with other providers:        Assessment:  (Response towards treatment session) (Pain Rating)  Patient rated his pain 2/10 after treatment. Plan for Next Session:        Time In / Time Out:  0947/1017    Timed Code/Total Treatment Minutes:  27' (15' TE, 15' MT  Insurance approved the followin              48 units  89921              48 units  28563              48 units  73985              48 units  33089              12 units    CPT Code Units today Running Total Units Total approved    TE 70252  4 2 04   DNE 39132  1 8 48   Gait 76808      NR 21074   48   TA  23523   48   Estim Unatt [address]      [facility] [place] [address]      PWBC 41142    12   ADL/Self care 22771      DNT 1-2 81304      DNT 3-4 79075      Other:                 Total for episode of care            Date range: 10/27/2021 thru 2022     Next Progress Note due:        Plan of Care Interventions:  [x] Therapeutic Exercise  [] Modalities:  [x] Therapeutic Activity     [] Ultrasound  [] Estim  [] Gait Training      [] Cervical Traction [] Lumbar Traction  [x] Neuromuscular Re-education    [x] Cold/hotpack [] Iontophoresis   [x] Instruction in HEP      [] Vasopneumatic   [] Dry Needling    [x] Manual Therapy               [] Aquatic Therapy              Electronically signed by:  Iwona Gutierrez PTA     12/7/2021, 9:45 AM

## 2021-12-09 ENCOUNTER — OFFICE VISIT (OUTPATIENT)
Dept: ORTHOPEDIC SURGERY | Age: 66
End: 2021-12-09
Payer: MEDICAID

## 2021-12-09 ENCOUNTER — HOSPITAL ENCOUNTER (OUTPATIENT)
Dept: PHYSICAL THERAPY | Age: 66
Setting detail: THERAPIES SERIES
Discharge: HOME | End: 2021-12-09
Payer: MEDICAID

## 2021-12-09 VITALS
RESPIRATION RATE: 15 BRPM | BODY MASS INDEX: 33.79 KG/M2 | OXYGEN SATURATION: 96 % | HEIGHT: 70 IN | HEART RATE: 100 BPM | WEIGHT: 236 LBS

## 2021-12-09 DIAGNOSIS — Z98.890 STATUS POST LEFT ROTATOR CUFF REPAIR: Primary | ICD-10-CM

## 2021-12-09 PROCEDURE — G8427 DOCREV CUR MEDS BY ELIG CLIN: HCPCS | Performed by: ORTHOPAEDIC SURGERY

## 2021-12-09 PROCEDURE — 3017F COLORECTAL CA SCREEN DOC REV: CPT | Performed by: ORTHOPAEDIC SURGERY

## 2021-12-09 PROCEDURE — G8417 CALC BMI ABV UP PARAM F/U: HCPCS | Performed by: ORTHOPAEDIC SURGERY

## 2021-12-09 PROCEDURE — 97110 THERAPEUTIC EXERCISES: CPT

## 2021-12-09 PROCEDURE — G8484 FLU IMMUNIZE NO ADMIN: HCPCS | Performed by: ORTHOPAEDIC SURGERY

## 2021-12-09 PROCEDURE — 97140 MANUAL THERAPY 1/> REGIONS: CPT

## 2021-12-09 PROCEDURE — 99212 OFFICE O/P EST SF 10 MIN: CPT | Performed by: ORTHOPAEDIC SURGERY

## 2021-12-09 PROCEDURE — 1036F TOBACCO NON-USER: CPT | Performed by: ORTHOPAEDIC SURGERY

## 2021-12-09 RX ORDER — LISINOPRIL 40 MG/1
TABLET ORAL
COMMUNITY
Start: 2021-10-22

## 2021-12-09 RX ORDER — DULAGLUTIDE 1.5 MG/.5ML
INJECTION, SOLUTION SUBCUTANEOUS
COMMUNITY
Start: 2021-11-10

## 2021-12-09 RX ORDER — FUROSEMIDE 20 MG/1
TABLET ORAL
COMMUNITY
Start: 2021-10-22

## 2021-12-09 RX ORDER — DULAGLUTIDE 0.75 MG/.5ML
INJECTION, SOLUTION SUBCUTANEOUS
COMMUNITY
Start: 2021-10-13

## 2021-12-09 ASSESSMENT — ENCOUNTER SYMPTOMS
COLOR CHANGE: 0
SHORTNESS OF BREATH: 0
CHEST TIGHTNESS: 0

## 2021-12-09 NOTE — PROGRESS NOTES
Outpatient Physical Therapy           [place]           [] Phone: [phone]   Fax: [phone]  [place]           [x] Phone: [phone]   Fax: [phone]      Referring Practitioner: Guero Hall                                          From: Claude Bienenstock, PT,            Patient: Letty Chou                                                            : 1955  Diagnosis: Diagnosis: M47.812- crvical spondylosis              Treatment Diagnosis: Treatment Diagnosis: neck pain [x]  Progress Note                []  Discharge Note    Evaluation Date:  10/26/21   Total Visits to date:10    Cancels/No-shows to date:    Subjective:   Patient rates his pain 2/10 today.   Pain continues to fluctuate depending on his activity level  Plan of Care/Treatment to date:  [x] Therapeutic Exercise    [] Modalities:  [x] Therapeutic Activity     [] Ultrasound  [] Electrical Stimulation  [] Gait Training      [] Cervical Traction   [] Lumbar Traction  [x] Neuromuscular Re-education  [] Cold/hotpack [] Iontophoresis  [x] Instruction in HEP      Other:  [x] Manual Therapy       []  Vasopneumatic  [] Aquatic Therapy       []   Dry Needle Therapy                    Objective/Significant Findings At Last Visit/Comments:   Remains ttp over B UT  NDI Score  Assessment:  @ eval  Clinical findings:NDI 18/50, neck ROT 60* R/L - NDI improved from  to   Goal Status:  [] Achieved [] Partially Achieved  [x] Not Achieved   Long term goal 1: patient will be independent with HEP  Long term goal 2: patient will score 10/50 on NDI   Changes to goals:          Frequency/Duration:  # Days per week: [] 1 day # Weeks: [] 1 week [] 4 weeks [] 8 weeks     [] 2 days   [] 2 weeks [] 5 weeks [] 10 weeks     [] 3 days   [] 3 weeks [] 6 weeks [] 12 weeks     Rehab Potential: [] Excellent [x] Good [] Fair  [] Poor  Patient Status: [] Continue per initial plan of Care     [] Patient now discharged     [x] Additional visits requested, Please re-certify for additional visits:      Requested frequency/duration:  1-2/week for 6weeks  If we are requesting more visits, we fully anticipate the patient's condition is expected to improve within the treatment timeframe we are requesting. Electronically signed by:  Zenaida Wiggins PT, PT, 12/9/2021, 4:22 PM  If you have any questions or concerns, please don't hesitate to call.   Thank you for your referral.    Physician Signature:______________________ Date:______ Time: ________  By signing above, therapists plan is approved by physician

## 2021-12-09 NOTE — PROGRESS NOTES
Patient returns to the office today for fu of the left shoulder scope DOS 5/10/21. Pt states pain today is a 3/10 will increase with ROM. Pt states overall the shoulder is doing well. He is still having pain in the neck area. Pt states he is working with therapy for the left shoulder.
throughout the upper extremity      Assessment and Plan  1. Status post left shoulder arthroscopic rotator cuff repair    Patient is made good progress with his range of motion and strengthening activities since the previous visit. He feels that his shoulder is comfortable and moving well. He has no complaints today and I have recommended that he continue building up his strength and range of motion as a home exercise program.    He will call as needed if there are any question questions or concerns.             Electronically signed by Natasha Lancaster MD on 12/9/2021 at 10:05 AM

## 2021-12-09 NOTE — FLOWSHEET NOTE
Outpatient Physical Therapy  [place]           [x] Phone: [phone]   Fax: [phone]  [place]           [] Phone: [phone]   Fax: [phone]        Physical Therapy Daily Treatment Note  Date:  2021    Patient Name:  India Beltran    :  1955  MRN: [id number]  Restrictions/Precautions:  Other position/activity restrictions: none  Diagnosis:   Diagnosis: M47.812- crvical spondylosis  Date of Injury/Surgery:   Treatment Diagnosis: Treatment Diagnosis: neck pain    Insurance/Certification information: PT Insurance Information: [facility] CP- needs pre cert   Referring Physician:  Referring Practitioner: Alayna Reed  Next Doctor Visit:    Plan of care signed (Y/N):    Outcome Measure:  NDI 18/50  Visit# / total visits:  10/  Pain level: 2/10 R c- spine  Goals:     Patient goals : no neck pain     Long term goals  Time Frame for Long term goals : 6 weeks  Long term goal 1: patient will be independent with HEP  Long term goal 2: patient will score 10/50 on [facility]    ASSESSMENT  Patient primary complaints: neck pain  History of condition: Ongoing  R sided neck pain since - ; insidious onset;constant  Current functional limitations:  Able to complete all necessary ADLs/IADLs with neck pain present  Clinical findings:NDI 18/50, neck ROT 60* R/L  PLOF:Pt was independent with ADLs/IADLs without significant pain or difficulties prior to July of this year  Patient's response to treatment:able to perform HEP, neck pain present @ end of session  Skilled PT interventions are intended to:  Decrease pain which will enable patient  to return to PLOF  Patient agrees with established plan of care and assisted in the development of their  goals  Barriers to learning:none- no mental/cognitive barriers observed  Preferred learning style(s):   written- demonstration -practice  Preferred Language: English  Potential barriers to progress:none  The patient appears motivated to participate in PT and regain PLOF: yes    Subjective: Patient rates his pain 2/10 today. Pain continues to fluctuate depending on his activity level. Any changes in Ambulatory Summary Sheet? None        Objective:  Remains ttp over B UT   16/50 NDI Score    COVID screening questions were asked and patient attested that there had been no contact or symptoms        Exercises: (No more than 4 columns)   Exercise/Equipment Date:   21            WARM UP          UBE F/B 2/2 F/B 2/2 F/B 2/2 F/B 2/2          TABLE       Manual therapy       chin tucks Supine 2x10x5\" Supine 2x10x5\" Supine 2x10x5\" Supine 2x10x5\"                           STANDING       LAE/ lat rows Pink TT 2x10 Pink TT 2x10 Pink TT 2x10 Pink TT 20x ea                                                   PROPRIOCEPTION                                          MODALITIES                         Other Therapeutic Activities/Education:        Home Exercise Program:  Chin tucks/ cervical ROT      Manual Treatments:  Supine STM UT/ cervical spine distraction      Modalities:        Communication with other providers:        Assessment:  (Response towards treatment session) (Pain Rating)  Patient rated his pain 1/10 after treatment. Plan for Next Session:        Time In / Time Out:  1116/1150    Timed Code/Total Treatment Minutes:  29' (15' MT, 23' TE)    Insurance approved the followin              48 units  39554              48 units  91635              48 units  58638              48 units  55069              12 units    CPT Code Units today Running Total Units Total approved    TE 93921  1 [address]  1 9 48   Gait 62915      NR 90665   48   TA  15280   48   Estim Unatt [address]       [place] [address]      [facility] [place] [address]      EEFW [address]    12   ADL/Self care 06590      DNT 1-2 60921      DNT 3-4       Other:                 Total for episode of care            Date range: 10/27/2021 thru 2022     Next Progress Note due: Plan of Care Interventions:  [x] Therapeutic Exercise  [] Modalities:  [x] Therapeutic Activity     [] Ultrasound  [] Estim  [] Gait Training      [] Cervical Traction [] Lumbar Traction  [x] Neuromuscular Re-education    [x] Cold/hotpack [] Iontophoresis   [x] Instruction in HEP      [] Vasopneumatic   [] Dry Needling    [x] Manual Therapy               [] Aquatic Therapy              Electronically signed by:  Gabe Dillon PTA     12/9/2021, 11:16 AM

## 2021-12-14 ENCOUNTER — HOSPITAL ENCOUNTER (OUTPATIENT)
Dept: PHYSICAL THERAPY | Age: 66
Setting detail: THERAPIES SERIES
Discharge: HOME | End: 2021-12-14
Payer: MEDICAID

## 2021-12-14 PROCEDURE — 97110 THERAPEUTIC EXERCISES: CPT

## 2021-12-14 PROCEDURE — 97140 MANUAL THERAPY 1/> REGIONS: CPT

## 2021-12-17 ENCOUNTER — HOSPITAL ENCOUNTER (OUTPATIENT)
Dept: PHYSICAL THERAPY | Age: 66
Setting detail: THERAPIES SERIES
Discharge: HOME | End: 2021-12-17
Payer: MEDICAID

## 2021-12-17 PROCEDURE — 97110 THERAPEUTIC EXERCISES: CPT

## 2021-12-17 PROCEDURE — 97140 MANUAL THERAPY 1/> REGIONS: CPT

## 2021-12-17 NOTE — FLOWSHEET NOTE
Outpatient Physical Therapy  [place]           [x] Phone: [phone]   Fax: [phone]  [place]           [] Phone: [phone]   Fax: [phone]        Physical Therapy Daily Treatment Note  Date:  2021    Patient Name:  Brenita Burkitt    :  1955  MRN: [id number]  Restrictions/Precautions:  Other position/activity restrictions: none  Diagnosis:   Diagnosis: M47.812- crvical spondylosis  Date of Injury/Surgery:   Treatment Diagnosis: Treatment Diagnosis: neck pain    Insurance/Certification information: PT Insurance Information: [facility] CP- needs pre cert   Referring Physician:  Referring Practitioner: Denys Gudino  Next Doctor Visit:    Plan of care signed (Y/N):    Outcome Measure:  NDI 18/50  Visit# / total visits:   then PN  Pain level: 2/10 R c- spine  Goals:     Patient goals : no neck pain     Long term goals  Time Frame for Long term goals : plan expires 21  Long term goal 1: patient will be independent with HEP  Long term goal 2: patient will score 10/50 on [facility]    ASSESSMENT  Patient primary complaints: neck pain  History of condition: Ongoing  R sided neck pain since - ; insidious onset;constant  Current functional limitations:  Able to complete all necessary ADLs/IADLs with neck pain present  Clinical findings:NDI 18/50, neck ROT 60* R/L  PLOF:Pt was independent with ADLs/IADLs without significant pain or difficulties prior to July of this year  Patient's response to treatment:able to perform HEP, neck pain present @ end of session  Skilled PT interventions are intended to:  Decrease pain which will enable patient  to return to PLOF  Patient agrees with established plan of care and assisted in the development of their  goals  Barriers to learning:none- no mental/cognitive barriers observed  Preferred learning style(s):   written- demonstration -practice  Preferred Language: English  Potential barriers to progress:none  The patient appears motivated to participate in PT and regain PLOF: yes    Subjective: Patient rates his pain 2/10 today. Any changes in Ambulatory Summary Sheet? None        Objective:  Continued increased tightness in B UT    COVID screening questions were asked and patient attested that there had been no contact or symptoms        Exercises: (No more than 4 columns)   Exercise/Equipment 2021            WARM UP          UBE F/B 2/2 F/B 2/2 F/B 2/2 F/B 2/2          TABLE       Manual therapy       chin tucks Supine 2x10x5\" Supine 2x10x5\"     Taffy Pulls   RTB 20x  RTB 20x                    STANDING       LAE/ lat rows Pink TT 2x10 Pink TT 20x ea  Pink TT 20x ea  Pink TT 30x ea                                                   PROPRIOCEPTION                                          MODALITIES                         Other Therapeutic Activities/Education:        Home Exercise Program:  Chin tucks/ cervical ROT      Manual Treatments:  Supine STM UT/ cervical spine distraction      Modalities:        Communication with other providers:        Assessment:  (Response towards treatment session) (Pain Rating)  Patient rated his pain 2/10 after treatment. Plan for Next Session:        Time In / Time Out:  1049/1115  Timed Code/Total Treatment Minutes:  22' (15' MT, 10' TE)  Insurance approved the followin              48 units  53137              48 units  32893              48 units  69990              48 units  21122              12 units    CPT Code Units today Running Total Units Total approved    TE 78879  2 69 02   NGP 28222  1 10 48   Gait 49137      NR 65504   [facility] [place] [facility]    12   ADL/Self care 76807      DNT 1-2 04825      DNT 3-4 82861      Other:                 Total for episode of care            Date range: 10/27/2021 thru 2022     Next Progress Note due:        Plan of Care Interventions:  [x] Therapeutic Exercise  [] Modalities:  [x] Therapeutic Activity     [] Ultrasound  [] Estim  [] Gait Training      [] Cervical Traction [] Lumbar Traction  [x] Neuromuscular Re-education    [x] Cold/hotpack [] Iontophoresis   [x] Instruction in HEP      [] Vasopneumatic   [] Dry Needling    [x] Manual Therapy               [] Aquatic Therapy              Electronically signed by:  NAHED Reina PT    12/17/2021, 10:50 AM

## 2021-12-30 ENCOUNTER — HOSPITAL ENCOUNTER (OUTPATIENT)
Dept: PHYSICAL THERAPY | Age: 66
Setting detail: THERAPIES SERIES
Discharge: HOME | End: 2021-12-30
Payer: MEDICAID

## 2021-12-30 PROCEDURE — 97140 MANUAL THERAPY 1/> REGIONS: CPT

## 2021-12-30 PROCEDURE — 97110 THERAPEUTIC EXERCISES: CPT

## 2021-12-30 NOTE — FLOWSHEET NOTE
Outpatient Physical Therapy  [place]           [x] Phone: [phone]   Fax: [phone]  [place] Wright           [] Phone: [phone]   Fax: [phone]        Physical Therapy Daily Treatment Note  Date:  2021    Patient Name:  Brenita Burkitt    :  1955  MRN: [id number]  Restrictions/Precautions:  Other position/activity restrictions: none  Diagnosis:   Diagnosis: M47.812- crvical spondylosis  Date of Injury/Surgery:   Treatment Diagnosis: Treatment Diagnosis: neck pain    Insurance/Certification information: PT Insurance Information: [facility] CP- needs pre cert   Referring Physician:  Referring Practitioner: Denys Gudino  Next Doctor Visit:    Plan of care signed (Y/N):    Outcome Measure:  NDI 18/50  Visit# / total visits:   then PN  Pain level: 2/10 R c- spine  Goals:     Patient goals : no neck pain     Long term goals  Time Frame for Long term goals : plan expires 21  Long term goal 1: patient will be independent with HEP  Long term goal 2: patient will score 10/50 on [facility]    ASSESSMENT  Patient primary complaints: neck pain  History of condition: Ongoing  R sided neck pain since - ; insidious onset;constant  Current functional limitations:  Able to complete all necessary ADLs/IADLs with neck pain present  Clinical findings:NDI 18/50, neck ROT 60* R/L  PLOF:Pt was independent with ADLs/IADLs without significant pain or difficulties prior to July of this year  Patient's response to treatment:able to perform HEP, neck pain present @ end of session  Skilled PT interventions are intended to:  Decrease pain which will enable patient  to return to PLOF  Patient agrees with established plan of care and assisted in the development of their  goals  Barriers to learning:none- no mental/cognitive barriers observed  Preferred learning style(s):   written- demonstration -practice  Preferred Language: English  Potential barriers to progress:none  The patient appears motivated to Ultrasound  [] Estim  [] Gait Training      [] Cervical Traction [] Lumbar Traction  [x] Neuromuscular Re-education    [x] Cold/hotpack [] Iontophoresis   [x] Instruction in HEP      [] Vasopneumatic   [] Dry Needling    [x] Manual Therapy               [] Aquatic Therapy              Electronically signed by:  Rochelle Reynolds PT    12/30/2021, 10:46 AM

## 2022-01-03 ENCOUNTER — HOSPITAL ENCOUNTER (OUTPATIENT)
Dept: PHYSICAL THERAPY | Age: 67
Setting detail: THERAPIES SERIES
Discharge: HOME | End: 2022-01-03
Payer: MEDICAID

## 2022-01-03 PROCEDURE — 97110 THERAPEUTIC EXERCISES: CPT

## 2022-01-03 PROCEDURE — 97140 MANUAL THERAPY 1/> REGIONS: CPT

## 2022-01-03 NOTE — FLOWSHEET NOTE
Outpatient Physical Therapy  [place]           [x] Phone: [phone]   Fax: [phone]  [place]           [] Phone: [phone]   Fax: [phone]        Physical Therapy Daily Treatment Note  Date:  1/3/2022    Patient Name:  Giuliana Sánchez    :  1955  MRN: [id number]  Restrictions/Precautions:  Other position/activity restrictions: none  Diagnosis:   Diagnosis: M47.812- crvical spondylosis  Date of Injury/Surgery:   Treatment Diagnosis: Treatment Diagnosis: neck pain    Insurance/Certification information: PT Insurance Information: [facility] CP- needs pre cert   Referring Physician:  Referring Practitioner: Madalyn Hunt Doctor Visit:    Plan of care signed (Y/N):    Outcome Measure:  NDI 18/50  Visit# / total visits:   then PN  Pain level: 2/10 R c- spine  Goals:     Patient goals : no neck pain     Long term goals  Time Frame for Long term goals : plan expires 21  Long term goal 1: patient will be independent with HEP  Long term goal 2: patient will score 10/50 on [facility]    ASSESSMENT  Patient primary complaints: neck pain  History of condition: Ongoing  R sided neck pain since - ; insidious onset;constant  Current functional limitations:  Able to complete all necessary ADLs/IADLs with neck pain present  Clinical findings:NDI 18/50, neck ROT 60* R/L  PLOF:Pt was independent with ADLs/IADLs without significant pain or difficulties prior to July of this year  Patient's response to treatment:able to perform HEP, neck pain present @ end of session  Skilled PT interventions are intended to:  Decrease pain which will enable patient  to return to PLOF  Patient agrees with established plan of care and assisted in the development of their  goals  Barriers to learning:none- no mental/cognitive barriers observed  Preferred learning style(s):   written- demonstration -practice  Preferred Language: English  Potential barriers to progress:none  The patient appears motivated to participate in PT and regain PLOF: yes    Subjective: Patient states that some days his neck feels better, but other days it does not feel any better at all. Is supposed to follow up with the surgeon when his therapy is complete. Any changes in Ambulatory Summary Sheet? None        Objective: Increased tightness noted in R UT today.       COVID screening questions were asked and patient attested that there had been no contact or symptoms        Exercises: (No more than 4 columns)   Exercise/Equipment 2021 2021 12/30/21 1/3/2022            WARM UP          UBE F/B 2/2 F/B / F/B  F/B           TABLE       Manual therapy       Taffy Pulls RTB 20x  RTB 20x RTB 30x RTB 30x                     STANDING       LAE/ lat rows Pink TT 20x ea  Pink TT 30x ea  Pink TT 30x ea  Pink TT 30x ea                                                   PROPRIOCEPTION                                          MODALITIES                         Other Therapeutic Activities/Education:        Home Exercise Program:  Chin tucks/ cervical ROT      Manual Treatments:  Supine STM UT/ cervical spine distraction      Modalities:        Communication with other providers:        Assessment:  (Response towards treatment session) (Pain Rating)  Patient continues to have good days and bad days, but overall function has improved since eval.        Plan for Next Session:        Time In / Time Out:  1550/1620    Timed Code/Total Treatment Minutes:  30' (15' MT, 15' TE)  Insurance approved the followin              48 units  21331              48 units  38684              48 units  87728              48 units  78772              12 units    CPT Code Units today Running Total Units Total approved    TE 24547  1 12 48   MAN 36857  1 12 48   Gait 90347      NR 71417   48   TA  59150   48   Estim Unatt 27722        11009      [facility] [place] [address] [facility] [address]    12   ADL/Self care 40063      DNT 1-2 91795      DNT 3-4 17846      Other: Total for episode of care            Date range: 10/27/2021 thru 01/25/2022     Next Progress Note due:        Plan of Care Interventions:  [x] Therapeutic Exercise  [] Modalities:  [x] Therapeutic Activity     [] Ultrasound  [] Estim  [] Gait Training      [] Cervical Traction [] Lumbar Traction  [x] Neuromuscular Re-education    [x] Cold/hotpack [] Iontophoresis   [x] Instruction in HEP      [] Vasopneumatic   [] Dry Needling    [x] Manual Therapy               [] Aquatic Therapy              Electronically signed by:  Rajendra Hager PTA   1/3/2022, 3:49 PM

## 2022-01-06 ENCOUNTER — HOSPITAL ENCOUNTER (OUTPATIENT)
Dept: PHYSICAL THERAPY | Age: 67
Setting detail: THERAPIES SERIES
Discharge: HOME | End: 2022-01-06
Payer: MEDICAID

## 2022-01-06 PROCEDURE — 97140 MANUAL THERAPY 1/> REGIONS: CPT

## 2022-01-06 PROCEDURE — 97110 THERAPEUTIC EXERCISES: CPT

## 2022-01-06 NOTE — FLOWSHEET NOTE
Outpatient Physical Therapy  [place]           [x] Phone: [phone]   Fax: [phone]  [place]           [] Phone: [phone]   Fax: [phone]        Physical Therapy Daily Treatment Note  Date:  2022    Patient Name:  James Cespedes    :  1955  MRN: [id number]  Restrictions/Precautions:  Other position/activity restrictions: none  Diagnosis:   Diagnosis: M47.812- crvical spondylosis  Date of Injury/Surgery:   Treatment Diagnosis: Treatment Diagnosis: neck pain    Insurance/Certification information: PT Insurance Information: [facility] CP- needs pre cert   Referring Physician:  Referring Practitioner: Yesika Harley  Next Doctor Visit:    Plan of care signed (Y/N):    Outcome Measure:  NDI 18/50  Visit# / total visits:  15/20 then PN 10/27/2021 thru 2022  Pain level: 2/10 R c- spine  Goals:     Patient goals : no neck pain     Long term goals  Time Frame for Long term goals : plan expires 21  Long term goal 1: patient will be independent with HEP  Long term goal 2: patient will score 10/50 on [facility]    ASSESSMENT  Patient primary complaints: neck pain  History of condition: Ongoing  R sided neck pain since - ; insidious onset;constant  Current functional limitations:  Able to complete all necessary ADLs/IADLs with neck pain present  Clinical findings:NDI 18/50, neck ROT 60* R/L  PLOF:Pt was independent with ADLs/IADLs without significant pain or difficulties prior to July of this year  Patient's response to treatment:able to perform HEP, neck pain present @ end of session  Skilled PT interventions are intended to:  Decrease pain which will enable patient  to return to PLOF  Patient agrees with established plan of care and assisted in the development of their  goals  Barriers to learning:none- no mental/cognitive barriers observed  Preferred learning style(s):   written- demonstration -practice  Preferred Language: English  Potential barriers to progress:none  The patient appears motivated to participate in PT and regain PLOF: yes    Subjective: Patient reports of 2/10 pain upon arrival, but reports the other night his pain went up to an 8/10. Any changes in Ambulatory Summary Sheet? None        Objective: Increased tightness noted in R UT today. COVID screening questions were asked and patient attested that there had been no contact or symptoms        Exercises: (No more than 4 columns)   Exercise/Equipment 12/30/21 1/3/2022 2022           WARM UP         UBE F/B 2/2 F/B 2/2 F/B 3/3         TABLE      Manual therapy      Taffy Pulls RTB 30x RTB 30x  GTB 30x                  STANDING      LAE/ lat rows Pink TT 30x ea  Pink TT 30x ea  SHIRA 30x ea                                             PROPRIOCEPTION                                    MODALITIES                      Other Therapeutic Activities/Education:        Home Exercise Program:  Chin tucks/ cervical ROT      Manual Treatments:  Supine STM UT/ cervical spine distraction      Modalities:        Communication with other providers:        Assessment:  (Response towards treatment session) (Pain Rating) Patient denies pain at end of session. Patient continues to have good days and bad days, but overall function has improved since eval.        Plan for Next Session:        Time In / Time Out:   1114/1152    Timed Code/Total Treatment Minutes:   38 15man 23te    Insurance approved the followin              48 units  25115              48 units  57131              48 units  66029              48 units  74586              12 units    CPT Code Units today Running Total Units Total approved    TE 82644  7 72 18   [facility] [address]  1 13 48   Gait 66186      NR 07251   [address] [facility] [place] [address]    12   ADL/Self care 18249      DNT 1-2       DNT 3-4       Other:                 Total for episode of care            Date range: 10/27/2021 thru 01/25/2022     Next Progress Note due:        Plan of Care Interventions:  [x] Therapeutic Exercise  [] Modalities:  [x] Therapeutic Activity     [] Ultrasound  [] Estim  [] Gait Training      [] Cervical Traction [] Lumbar Traction  [x] Neuromuscular Re-education    [x] Cold/hotpack [] Iontophoresis   [x] Instruction in HEP      [] Vasopneumatic   [] Dry Needling    [x] Manual Therapy               [] Aquatic Therapy              Electronically signed by:  Bernardine Huntington , PTA Areta Aase PT  1/6/2022, 11:15 AM

## 2022-01-10 ENCOUNTER — HOSPITAL ENCOUNTER (OUTPATIENT)
Dept: PHYSICAL THERAPY | Age: 67
Setting detail: THERAPIES SERIES
Discharge: HOME | End: 2022-01-10
Payer: MEDICAID

## 2022-01-10 PROCEDURE — 97110 THERAPEUTIC EXERCISES: CPT

## 2022-01-10 PROCEDURE — 97140 MANUAL THERAPY 1/> REGIONS: CPT

## 2022-01-10 NOTE — FLOWSHEET NOTE
Outpatient Physical Therapy  [place]           [x] Phone: [phone]   Fax: [phone]  [place]           [] Phone: [phone]   Fax: [phone]        Physical Therapy Daily Treatment Note  Date:  1/10/2022    Patient Name:  Tita Frias    :  1955  MRN: [id number]  Restrictions/Precautions:  Other position/activity restrictions: none  Diagnosis:   Diagnosis: M47.812- crvical spondylosis  Date of Injury/Surgery:   Treatment Diagnosis: Treatment Diagnosis: neck pain    Insurance/Certification information: PT Insurance Information: [facility] CP- needs pre cert   Referring Physician:  Referring Practitioner: Arlene Ron  Next Doctor Visit:    Plan of care signed (Y/N):    Outcome Measure:  NDI 18/50  Visit# / total visits:   then PN 10/27/2021 thru 2022  Pain level: 2/10 R c- spine  Goals:     Patient goals : no neck pain     Long term goals  Time Frame for Long term goals : plan expires 21  Long term goal 1: patient will be independent with HEP  Long term goal 2: patient will score 10/50 on [facility]    ASSESSMENT  Patient primary complaints: neck pain  History of condition: Ongoing  R sided neck pain since - ; insidious onset;constant  Current functional limitations:  Able to complete all necessary ADLs/IADLs with neck pain present  Clinical findings:NDI 18/50, neck ROT 60* R/L  PLOF:Pt was independent with ADLs/IADLs without significant pain or difficulties prior to July of this year  Patient's response to treatment:able to perform HEP, neck pain present @ end of session  Skilled PT interventions are intended to:  Decrease pain which will enable patient  to return to PLOF  Patient agrees with established plan of care and assisted in the development of their  goals  Barriers to learning:none- no mental/cognitive barriers observed  Preferred learning style(s):   written- demonstration -practice  Preferred Language: English  Potential barriers to progress:none  The patient appears motivated to participate in PT and regain PLOF: yes    Subjective: patient reports he is still not sleeping well; patient reports shoulders are now more mobile since starting PT  Any changes in Ambulatory Summary Sheet? None        Objective:    COVID screening questions were asked and patient attested that there had been no contact or symptoms        Exercises: (No more than 4 columns)   Exercise/Equipment 12/30/21 1/3/2022 2022 1/10/22            WARM UP          UBE F/B 2/2 F/B  F/B 3/3 F/B 3/3          TABLE       Manual therapy       Taffy Pulls RTB 30x RTB 30x  GTB 30x GTB 30x                    STANDING       LAE/ lat rows Pink TT 30x ea  Pink TT 30x ea  SHIRA 30x ea  SHIRA 30x ea                                                   PROPRIOCEPTION                                          MODALITIES                         Other Therapeutic Activities/Education:        Home Exercise Program:  Chin tucks/ cervical ROT      Manual Treatments:  Supine STM UT/ cervical spine distraction      Modalities:        Communication with other providers:        Assessment:  (Response towards treatment session) (Pain Rating) Patient denies pain at end of session. Patient continues to have good days and bad days, but overall function has improved since eval.        Plan for Next Session:        Time In / Time Out:       Timed Code/Total Treatment Minutes:      Insurance approved the followin              48 units  22963              48 units  74021              48 units  09114              48 units  92165              12 units    CPT Code Units today Running Total Units Total approved    TE 07020  5 18 51   TXN 31046  1 13 48   Gait [address]   [address] [facility] [facility] [address] [place] [address] [facility]    12   ADL/Self care 74292      DNT 1-2       DNT 3-4       Other:                 Total for episode of care            Date range: 10/27/2021 thru 01/25/2022     Next Progress Note due:        Plan of Care Interventions:  [x] Therapeutic Exercise  [] Modalities:  [x] Therapeutic Activity     [] Ultrasound  [] Estim  [] Gait Training      [] Cervical Traction [] Lumbar Traction  [x] Neuromuscular Re-education    [x] Cold/hotpack [] Iontophoresis   [x] Instruction in HEP      [] Vasopneumatic   [] Dry Needling    [x] Manual Therapy               [] Aquatic Therapy              Electronically signed by:  Donna Smith PT ,  1/10/2022, 9:03 AM

## 2022-01-13 ENCOUNTER — HOSPITAL ENCOUNTER (OUTPATIENT)
Dept: PHYSICAL THERAPY | Age: 67
Setting detail: THERAPIES SERIES
Discharge: HOME | End: 2022-01-13
Payer: MEDICAID

## 2022-01-13 PROCEDURE — 97110 THERAPEUTIC EXERCISES: CPT

## 2022-01-13 PROCEDURE — 97140 MANUAL THERAPY 1/> REGIONS: CPT

## 2022-01-13 NOTE — FLOWSHEET NOTE
Outpatient Physical Therapy  [place]           [x] Phone: [phone]   Fax: [phone]  [place]           [] Phone: [phone]   Fax: [phone]        Physical Therapy Daily Treatment Note  Date:  2022    Patient Name:  Marlys Dlaey    :  1955  MRN: [id number]  Restrictions/Precautions:  Other position/activity restrictions: none  Diagnosis:   Diagnosis: M47.812- crvical spondylosis  Date of Injury/Surgery:   Treatment Diagnosis: Treatment Diagnosis: neck pain    Insurance/Certification information: PT Insurance Information: [facility] CP- needs pre cert   Referring Physician:  Referring Practitioner: Zee Castro  Next Doctor Visit:    Plan of care signed (Y/N):    Outcome Measure:  NDI 18/50  Visit# / total visits:   then PN 10/27/2021 thru 2022  Pain level: 7/10 R c- spine  Goals:     Patient goals : no neck pain     Long term goals  Time Frame for Long term goals : plan expires 21  Long term goal 1: patient will be independent with HEP  Long term goal 2: patient will score 10/50 on [facility]    ASSESSMENT  Patient primary complaints: neck pain  History of condition: Ongoing  R sided neck pain since - ; insidious onset;constant  Current functional limitations:  Able to complete all necessary ADLs/IADLs with neck pain present  Clinical findings:NDI 18/50, neck ROT 60* R/L  PLOF:Pt was independent with ADLs/IADLs without significant pain or difficulties prior to July of this year  Patient's response to treatment:able to perform HEP, neck pain present @ end of session  Skilled PT interventions are intended to:  Decrease pain which will enable patient  to return to PLOF  Patient agrees with established plan of care and assisted in the development of their  goals  Barriers to learning:none- no mental/cognitive barriers observed  Preferred learning style(s):   written- demonstration -practice  Preferred Language: English  Potential barriers to progress:none  The patient appears motivated to participate in PT and regain PLOF: yes    Subjective: Patient reports he has more intense neck pain since last PT session    Any changes in Ambulatory Summary Sheet? None        Objective:  No increase in pain noted at end of session    COVID screening questions were asked and patient attested that there had been no contact or symptoms        Exercises: (No more than 4 columns)   Exercise/Equipment 1/3/2022 2022 1/10/22 1/13/22            WARM UP          UBE F/B  F/B 3/3 F/B 3/3 F/B 3/3          TABLE       Manual therapy       Taffy Pulls RTB 30x  GTB 30x GTB 30x GTB 30x                    STANDING       LAE/ lat rows Pink TT 30x ea  SHIRA 30x ea  SHIRA 30x ea  SHIRA 30x ea                                                   PROPRIOCEPTION                                          MODALITIES                         Other Therapeutic Activities/Education:        Home Exercise Program:  Chin tucks/ cervical ROT      Manual Treatments:  Supine STM UT/ cervical spine distraction      Modalities:        Communication with other providers:        Assessment:  (Response towards treatment session) (Pain Rating) Patient reports of 7/10 pain at end of session. Patient continues to have good days and bad days, but overall function has improved since eval.        Plan for Next Session:        Time In / Time Out:    45/1023    Timed Code/Total Treatment Minutes:   38  15man 23te    Insurance approved the followin              48 units  66900              48 units  16414              48 units  44194              48 units  57676              12 units    CPT Code Units today Running Total Units Total approved    TE 34340  4 37 29   ZFT 63296  1 14 48   Gait 91152      NR 56439   [facility] [id number]      [facility] [address]    12   ADL/Self care 23517      DNT 1-2 06079      DNT 3-4       Other:                 Total for episode of care Date range: 10/27/2021 thru 01/25/2022     Next Progress Note due:        Plan of Care Interventions:  [x] Therapeutic Exercise  [] Modalities:  [x] Therapeutic Activity     [] Ultrasound  [] Estim  [] Gait Training      [] Cervical Traction [] Lumbar Traction  [x] Neuromuscular Re-education    [x] Cold/hotpack [] Iontophoresis   [x] Instruction in HEP      [] Vasopneumatic   [] Dry Needling    [x] Manual Therapy               [] Aquatic Therapy              Electronically signed by:  Tanvir Geiger PTA  1/13/2022, 9:48 AM

## 2022-01-20 ENCOUNTER — HOSPITAL ENCOUNTER (OUTPATIENT)
Dept: PHYSICAL THERAPY | Age: 67
Setting detail: THERAPIES SERIES
Discharge: HOME | End: 2022-01-20
Payer: MEDICAID

## 2022-01-20 PROCEDURE — 97140 MANUAL THERAPY 1/> REGIONS: CPT

## 2022-01-20 PROCEDURE — 97110 THERAPEUTIC EXERCISES: CPT

## 2022-01-20 NOTE — FLOWSHEET NOTE
Outpatient Physical Therapy  [place]           [x] Phone: [phone]   Fax: [phone]  Janene Cason           [] Phone: [phone]   Fax: [phone]        Physical Therapy Daily Treatment Note  Date:  2022    Patient Name:  Shayna Alvarado    :  1955  MRN: [id number]  Restrictions/Precautions:  Other position/activity restrictions: none  Diagnosis:   Diagnosis: M47.812- crvical spondylosis  Date of Injury/Surgery:   Treatment Diagnosis: Treatment Diagnosis: neck pain    Insurance/Certification information: PT Insurance Information: [facility] CP- needs pre cert   Referring Physician:  Referring Practitioner: Chel Tapia  Next Doctor Visit:    Plan of care signed (Y/N):    Outcome Measure:  NDI 18/50  Visit# / total visits:   then PN 10/27/2021 thru 2022  Pain level: 2/10 R c- spine  Goals:     Patient goals : no neck pain     Long term goals  Time Frame for Long term goals : plan expires 21  Long term goal 1: patient will be independent with HEP  Long term goal 2: patient will score 10/50 on [facility]    ASSESSMENT  Patient primary complaints: neck pain  History of condition: Ongoing  R sided neck pain since - ; insidious onset;constant  Current functional limitations:  Able to complete all necessary ADLs/IADLs with neck pain present  Clinical findings:NDI 18/50, neck ROT 60* R/L  PLOF:Pt was independent with ADLs/IADLs without significant pain or difficulties prior to July of this year  Patient's response to treatment:able to perform HEP, neck pain present @ end of session  Skilled PT interventions are intended to:  Decrease pain which will enable patient  to return to PLOF  Patient agrees with established plan of care and assisted in the development of their  goals  Barriers to learning:none- no mental/cognitive barriers observed  Preferred learning style(s):   written- demonstration -practice  Preferred Language: English  Potential barriers to progress:none  The patient appears motivated to participate in PT and regain PLOF: yes    Subjective: Patient reports he has more intense neck pain since last PT session    Any changes in Ambulatory Summary Sheet? None        Objective:  No increase in pain noted at end of session    COVID screening questions were asked and patient attested that there had been no contact or symptoms        Exercises: (No more than 4 columns)   Exercise/Equipment 1/10/22 1/13/22 2022           WARM UP         UBE F/B 3/3 F/B 3/3 F/B 3/3         TABLE      Manual therapy      Taffy Pulls GTB 30x GTB 30x GTB 30x                  STANDING      LAE/ lat rows SHIRA 30x ea  SHIRA 30x ea  SHIRA 30x ea                                            PROPRIOCEPTION                                    MODALITIES                      Other Therapeutic Activities/Education:        Home Exercise Program:  Chin tucks/ cervical ROT      Manual Treatments:  Supine STM UT/ cervical spine distraction      Modalities:        Communication with other providers:        Assessment:  (Response towards treatment session) (Pain Rating) Patient reports of 1-2/10 pain at end of session. Patient continues to have good days and bad days, but overall function has improved since eval.        Plan for Next Session:        Time In / Time Out:        Timed Code/Total Treatment Minutes:  [address] approved the followin              48 units  39917              48 units  71137              48 units  67023              48 units  78268              12 units    CPT Code Units today Running Total Units Total approved    TE 50120  7 98 61   [place] [address]  1 15 48   Gait [address] [facility] [address] [facility]      VASO [address]    12   ADL/Self care 11105      DNT 1-2 53269      DNT 3-4       Other:                 Total for episode of care            Date range: 10/27/2021 thru 2022     Next Progress Note due:        Plan of Care Interventions:  [x] Therapeutic Exercise  [] Modalities:  [x] Therapeutic Activity     [] Ultrasound  [] Estim  [] Gait Training      [] Cervical Traction [] Lumbar Traction  [x] Neuromuscular Re-education    [x] Cold/hotpack [] Iontophoresis   [x] Instruction in HEP      [] Vasopneumatic   [] Dry Needling    [x] Manual Therapy               [] Aquatic Therapy              Electronically signed by:  Micah Enriquez PTA  1/20/2022, 9:33 AM

## 2022-01-24 ENCOUNTER — HOSPITAL ENCOUNTER (OUTPATIENT)
Dept: PHYSICAL THERAPY | Age: 67
Setting detail: THERAPIES SERIES
Discharge: HOME | End: 2022-01-24
Payer: MEDICAID

## 2022-01-24 PROCEDURE — 97140 MANUAL THERAPY 1/> REGIONS: CPT

## 2022-01-24 PROCEDURE — 97110 THERAPEUTIC EXERCISES: CPT

## 2022-01-24 NOTE — FLOWSHEET NOTE
Outpatient Physical Therapy  [place]           [x] Phone: [phone]   Fax: [phone]  [place]           [] Phone: [phone]   Fax: [phone]        Physical Therapy Daily Treatment Note  Date:  2022    Patient Name:  Yina Vega    :  1955  MRN: [id number]  Restrictions/Precautions:  Other position/activity restrictions: none  Diagnosis:   Diagnosis: M47.812- crvical spondylosis  Date of Injury/Surgery:   Treatment Diagnosis: Treatment Diagnosis: neck pain    Insurance/Certification information: PT Insurance Information: [facility] CP- needs pre cert   Referring Physician:  Referring Practitioner: Yosi Hunt Doctor Visit:    Plan of care signed (Y/N):    Outcome Measure:  NDI 18/50  Visit# / total visits:   then PN 10/27/2021 thru 2022  Pain level: 2/10 R c- spine  Goals:     Patient goals : no neck pain     Long term goals  Time Frame for Long term goals : plan expires 21  Long term goal 1: patient will be independent with HEP  Long term goal 2: patient will score 10/50 on [facility]    ASSESSMENT  Patient primary complaints: neck pain  History of condition: Ongoing  R sided neck pain since - ; insidious onset;constant  Current functional limitations:  Able to complete all necessary ADLs/IADLs with neck pain present  Clinical findings:NDI 18/50, neck ROT 60* R/L  PLOF:Pt was independent with ADLs/IADLs without significant pain or difficulties prior to July of this year  Patient's response to treatment:able to perform HEP, neck pain present @ end of session  Skilled PT interventions are intended to:  Decrease pain which will enable patient  to return to PLOF  Patient agrees with established plan of care and assisted in the development of their  goals  Barriers to learning:none- no mental/cognitive barriers observed  Preferred learning style(s):   written- demonstration -practice  Preferred Language: English  Potential barriers to progress:none  The patient appears motivated to participate in PT and regain PLOF: yes    Subjective: Patient reports 10% improvement with PT    Any changes in Ambulatory Summary Sheet? None        Objective:  NDI   COVID screening questions were asked and patient attested that there had been no contact or symptoms        Exercises: (No more than 4 columns)   Exercise/Equipment 1/10/22 1/13/22 2022 1/24/22            WARM UP          UBE F/B 3/3 F/B 3/3 F/B 3/3 F/B 3/3          TABLE       Manual therapy       Taffy Pulls GTB 30x GTB 30x GTB 30x ---                    STANDING       LAE/ lat rows SHIRA 30x ea  SHIRA 30x ea  SHIRA 30x ea SHIRA 30x ea                                                                                                                                                                                                                                                                            PROPRIOCEPTION                                          MODALITIES                         Other Therapeutic Activities/Education:        Home Exercise Program:  Chin tucks/ cervical ROT      Manual Treatments:  Supine STM UT/ cervical spine distraction      Modalities:        Communication with other providers:        Assessment:  (Response towards treatment session) (Pain Rating) Patient reports of 1-2/10 pain at end of session.    Patient continues to have good days and bad days, but overall function has improved since eval.        Plan for Next Session:  discharge from PT      Time In / Time Out:    957/    Timed Code/Total Treatment Minutes:  Edson approved the followin              48 units  13313              48 units  77678              48 units  96303              48 units  62707              12 units    CPT Code Units today Running Total Units Total approved    TE 07491  2 87 54   GYB 37185  1 16 48   Gait [address] [place] [address] [facility] [id number]      [facility] [id number]    12   ADL/Self care 76792      DNT 1-2 20560      DNT 3-4 20561      Other:                 Total for episode of care            Date range: 10/27/2021 thru 01/25/2022     Next Progress Note due:        Plan of Care Interventions:  [x] Therapeutic Exercise  [] Modalities:  [x] Therapeutic Activity     [] Ultrasound  [] Estim  [] Gait Training      [] Cervical Traction [] Lumbar Traction  [x] Neuromuscular Re-education    [x] Cold/hotpack [] Iontophoresis   [x] Instruction in HEP      [] Vasopneumatic   [] Dry Needling    [x] Manual Therapy               [] Aquatic Therapy              Electronically signed by:  Rickie Marr PT ,   1/24/2022, 9:56 AM

## 2022-02-02 ENCOUNTER — HOSPITAL ENCOUNTER (OUTPATIENT)
Dept: NUCLEAR MEDICINE | Age: 67
Discharge: HOME | End: 2022-02-02
Payer: MEDICAID

## 2022-02-02 VITALS — WEIGHT: 234 LBS | BODY MASS INDEX: 32.76 KG/M2 | HEIGHT: 71 IN

## 2022-02-02 DIAGNOSIS — R10.811 RIGHT UPPER QUADRANT ABDOMINAL TENDERNESS, REBOUND TENDERNESS PRESENCE NOT SPECIFIED: ICD-10-CM

## 2022-02-02 DIAGNOSIS — R10.13 ABDOMINAL PAIN, EPIGASTRIC: ICD-10-CM

## 2022-02-02 PROCEDURE — A4216 STERILE WATER/SALINE, 10 ML: HCPCS | Performed by: INTERNAL MEDICINE

## 2022-02-02 PROCEDURE — 3430000000 HC RX DIAGNOSTIC RADIOPHARMACEUTICAL: Performed by: INTERNAL MEDICINE

## 2022-02-02 PROCEDURE — 2580000003 HC RX 258: Performed by: INTERNAL MEDICINE

## 2022-02-02 PROCEDURE — A9537 TC99M MEBROFENIN: HCPCS | Performed by: INTERNAL MEDICINE

## 2022-02-02 PROCEDURE — 78227 HEPATOBIL SYST IMAGE W/DRUG: CPT

## 2022-02-02 PROCEDURE — 6360000002 HC RX W HCPCS: Performed by: INTERNAL MEDICINE

## 2022-02-02 RX ADMIN — Medication 5 MILLICURIE: at 09:40

## 2022-02-02 RX ADMIN — SODIUM CHLORIDE 2.12 MCG: 9 INJECTION, SOLUTION INTRAMUSCULAR; INTRAVENOUS; SUBCUTANEOUS at 10:40

## 2022-02-02 NOTE — PROGRESS NOTES
Outpatient Physical Therapy           [place]           [] Phone: [phone]   Fax: [phone]  [place]           [x] Phone: [phone]   Fax: [phone]      Referring Practitioner: Yosi Monzon                                          From: Dimitri Hummel PT,            Patient: Yina Vega                                                            : 1955  Diagnosis: Diagnosis: M47.812- crvical spondylosis              Treatment Diagnosis: Treatment Diagnosis: neck pain []  Progress Note                [x]  Discharge Note    Evaluation Date:  10/26/21   Total Visits to date:19 - completed authorization   Cancels/No-shows to date:    Subjective:   Patient rates his pain 2/10 today. Pain continues to fluctuate depending on his activity level  Plan of Care/Treatment to date:  [x] Therapeutic Exercise    [] Modalities:  [x] Therapeutic Activity     [] Ultrasound  [] Electrical Stimulation  [] Gait Training      [] Cervical Traction   [] Lumbar Traction  [x] Neuromuscular Re-education  [] Cold/hotpack [] Iontophoresis  [x] Instruction in HEP      Other:  [x] Manual Therapy       []  Vasopneumatic  [] Aquatic Therapy       []   Dry Needle Therapy                    Objective/Significant Findings At Last Visit/Comments:   Remains ttp over B UT NDI 14/50  Assessment:  @ eval  Clinical findings:NDI 18/50, neck ROT 60* R/L - NDI improved from 1850 to 1450  Goal Status:  [] Achieved [] Partially Achieved  [x] Not Achieved   Long term goal 1: patient will be independent with HEP  Long term goal 2: patient will score 10/50 on NDI      [x] Patient now discharged      Electronically signed by:  Dimitri Hummel PT,, 2022, 9:18 AM  If you have any questions or concerns, please don't hesitate to call.   Thank you for your referral.

## 2022-02-11 ENCOUNTER — TELEPHONE (OUTPATIENT)
Dept: SURGERY | Age: 67
End: 2022-02-11

## 2022-03-17 ENCOUNTER — TELEPHONE (OUTPATIENT)
Dept: BARIATRICS/WEIGHT MGMT | Age: 67
End: 2022-03-17

## 2022-03-17 NOTE — TELEPHONE ENCOUNTER
lmvm- new pt ref - for ryq pain - with hida results of ef 37%     Paper ref - for dr. Mckenzie Calvert can make this appt.

## 2022-03-21 ENCOUNTER — TELEPHONE (OUTPATIENT)
Dept: SURGERY | Age: 67
End: 2022-03-21

## 2022-03-21 ENCOUNTER — OFFICE VISIT (OUTPATIENT)
Dept: SURGERY | Age: 67
End: 2022-03-21
Payer: MEDICAID

## 2022-03-21 ENCOUNTER — ANESTHESIA EVENT (OUTPATIENT)
Dept: OPERATING ROOM | Age: 67
End: 2022-03-21
Payer: MEDICAID

## 2022-03-21 VITALS
WEIGHT: 231.2 LBS | BODY MASS INDEX: 32.37 KG/M2 | DIASTOLIC BLOOD PRESSURE: 68 MMHG | HEART RATE: 93 BPM | SYSTOLIC BLOOD PRESSURE: 124 MMHG | HEIGHT: 71 IN | OXYGEN SATURATION: 96 %

## 2022-03-21 DIAGNOSIS — K82.8 BILIARY DYSKINESIA: Primary | ICD-10-CM

## 2022-03-21 PROCEDURE — 1036F TOBACCO NON-USER: CPT | Performed by: SURGERY

## 2022-03-21 PROCEDURE — 99214 OFFICE O/P EST MOD 30 MIN: CPT | Performed by: SURGERY

## 2022-03-21 PROCEDURE — G8417 CALC BMI ABV UP PARAM F/U: HCPCS | Performed by: SURGERY

## 2022-03-21 PROCEDURE — G8427 DOCREV CUR MEDS BY ELIG CLIN: HCPCS | Performed by: SURGERY

## 2022-03-21 PROCEDURE — G8484 FLU IMMUNIZE NO ADMIN: HCPCS | Performed by: SURGERY

## 2022-03-21 PROCEDURE — 3017F COLORECTAL CA SCREEN DOC REV: CPT | Performed by: SURGERY

## 2022-03-21 ASSESSMENT — PATIENT HEALTH QUESTIONNAIRE - PHQ9
SUM OF ALL RESPONSES TO PHQ QUESTIONS 1-9: 0
SUM OF ALL RESPONSES TO PHQ QUESTIONS 1-9: 0
1. LITTLE INTEREST OR PLEASURE IN DOING THINGS: 0
SUM OF ALL RESPONSES TO PHQ QUESTIONS 1-9: 0
SUM OF ALL RESPONSES TO PHQ9 QUESTIONS 1 & 2: 0
2. FEELING DOWN, DEPRESSED OR HOPELESS: 0
SUM OF ALL RESPONSES TO PHQ QUESTIONS 1-9: 0

## 2022-03-21 NOTE — ANESTHESIA PRE PROCEDURE
Department of Anesthesiology  Preprocedure Note       Name:  Annice Sandhoff   Age:  61 y.o.  :  1955                                          MRN:  [id number]         Date:  3/21/2022      Surgeon: Serenity Mann):  Jeanette Aguilar MD    Procedure: Procedure(s):  CHOLECYSTECTOMY LAPAROSCOPIC    Medications prior to admission:   Prior to Admission medications    Medication Sig Start Date End Date Taking? Authorizing Provider   TRULICITY 4.30 XK/9.3WZ SOPN  10/13/21   Historical Provider, MD   TRULICITY 1.5 UO/7.6VZ SOPN  11/10/21   Historical Provider, MD   furosemide (LASIX) 20 MG tablet  10/22/21   Historical Provider, MD   lisinopril (PRINIVIL;ZESTRIL) 40 MG tablet  10/22/21   Historical Provider, MD   methocarbamol (ROBAXIN) 750 MG tablet TAKE 1 TABLET BY MOUTH 4 TIMES DAILY AS NEEDED 7/3/21   Historical Provider, MD   gabapentin (NEURONTIN) 300 MG capsule Take 300 mg by mouth 4 times daily. Historical Provider, MD   blood glucose test strips (ONETOUCH ULTRA) strip CHECK BLOOD GLUCOSE TWICE DAILY 21   Historical Provider, MD   ibuprofen (ADVIL;MOTRIN) 800 MG tablet Take 1 tablet by mouth 3 times daily (with meals) 21   Camillia Goltz, PA-C   ONE TOUCH ULTRASOFT LANCETS MISC  20   Historical Provider, MD   pantoprazole (PROTONIX) 40 MG tablet 40 mg daily  21   Historical Provider, MD   sitaGLIPtin (JANUVIA) 100 MG tablet Take 100 mg by mouth daily    Historical Provider, MD   carvedilol (COREG) 12.5 MG tablet Take 12.5 mg by mouth 2 times daily (with meals)    Historical Provider, MD   metFORMIN (GLUCOPHAGE) 1000 MG tablet Take 1,000 mg by mouth 2 times daily (with meals). Historical Provider, MD   glimepiride (AMARYL) 4 MG tablet Take 4 mg by mouth 2 times daily     Historical Provider, MD   lisinopril (PRINIVIL;ZESTRIL) 20 MG tablet Take 20 mg by mouth daily. Historical Provider, MD       Current medications:    No current facility-administered medications for this encounter. SHOULDER ARTHROSCOPY, ROTATOR CUFF REPAIR, SUBACROMIAL DECOMPRESSION performed by Yon Yen MD at [facility] History:    Social History     Tobacco Use    Smoking status: Never Smoker    Smokeless tobacco: Never Used   Substance Use Topics    Alcohol use: Not Currently                                Counseling given: Not Answered      Vital Signs (Current): There were no vitals filed for this visit. BP Readings from Last 3 Encounters:   03/21/22 124/68   05/10/21 (!) 60/53   05/10/21 122/63       NPO Status:                                                                                 BMI:   Wt Readings from Last 3 Encounters:   03/21/22 231 lb 3.2 oz (104.9 kg)   02/02/22 234 lb (106.1 kg)   12/09/21 236 lb (107 kg)     There is no height or weight on file to calculate BMI.    CBC:   Lab Results   Component Value Date    WBC 7.1 05/04/2021    RBC 4.09 05/04/2021    HGB 12.4 05/04/2021    HCT 37.3 05/04/2021    MCV 91.2 05/04/2021    RDW 12.4 05/04/2021     05/04/2021       CMP:   Lab Results   Component Value Date     05/04/2021    K 4.9 05/04/2021    CL 98 05/04/2021    CO2 24 05/04/2021    BUN 28 05/04/2021    CREATININE 1.2 05/04/2021    GFRAA >60 05/04/2021    LABGLOM >60 05/04/2021    GLUCOSE 311 05/04/2021    PROT 7.4 09/24/2019    CALCIUM 10.2 05/04/2021    BILITOT 0.2 09/24/2019    ALKPHOS 98 09/24/2019    AST 32 09/24/2019    ALT 37 09/24/2019       POC Tests: No results for input(s): POCGLU, POCNA, POCK, POCCL, POCBUN, POCHEMO, POCHCT in the last 72 hours.     Coags: No results found for: PROTIME, INR, APTT    HCG (If Applicable): No results found for: PREGTESTUR, PREGSERUM, HCG, HCGQUANT     ABGs: No results found for: PHART, PO2ART, STU6CQZ, ATY8BCK, BEART, V1WNVVSV     Type & Screen (If Applicable):  No results found for: LABABO, LABRH    Drug/Infectious Status (If Applicable):  No results found for: HIV, HEPCAB    COVID-19 Screening (If Applicable):   Lab Results   Component Value Date    COVID19 NOT DETECTED 05/04/2021           Anesthesia Evaluation  Patient summary reviewed  Airway:         Dental:          Pulmonary:Negative Pulmonary ROS                              Cardiovascular:    (+) hypertension:,                   Neuro/Psych:                ROS comment: Cervical spondylosis with radiculopathy GI/Hepatic/Renal:   (+) GERD:, morbid obesity         ROS comment: Biliary dyskinesia. Endo/Other:    (+) Diabetes, . Abdominal:             Vascular: negative vascular ROS. Other Findings:             Anesthesia Plan      general     ASA 2       Induction: intravenous. ROSCEO Mcclure - CRNA   3/21/2022         Pre Anesthesia Assessment complete.  Chart reviewed on 3/21/2022

## 2022-03-21 NOTE — TELEPHONE ENCOUNTER
SPOKE TO /Navid Douglas REGARDING SURGERY (CHINYERE FISHER) SCHEDULED @ [facility].  NOTIFIED OF DATES, TIMES AND LOCATION    PHONE ASSESSMENT   SURGERY - 4/27/22 @ 848  P/O - 5/11/22 @ 910    NPO AFTER MIDNIGHT  HOLD BLOOD THINNERS

## 2022-03-23 ENCOUNTER — ANESTHESIA (OUTPATIENT)
Dept: OPERATING ROOM | Age: 67
End: 2022-03-23
Payer: MEDICAID

## 2022-03-23 ENCOUNTER — HOSPITAL ENCOUNTER (OUTPATIENT)
Age: 67
Setting detail: OUTPATIENT SURGERY
Discharge: HOME | End: 2022-03-23
Attending: SURGERY | Admitting: SURGERY
Payer: MEDICAID

## 2022-03-23 VITALS
TEMPERATURE: 97.7 F | SYSTOLIC BLOOD PRESSURE: 121 MMHG | RESPIRATION RATE: 17 BRPM | OXYGEN SATURATION: 99 % | DIASTOLIC BLOOD PRESSURE: 74 MMHG

## 2022-03-23 VITALS
DIASTOLIC BLOOD PRESSURE: 88 MMHG | SYSTOLIC BLOOD PRESSURE: 171 MMHG | BODY MASS INDEX: 32.34 KG/M2 | RESPIRATION RATE: 18 BRPM | WEIGHT: 231 LBS | TEMPERATURE: 97.1 F | OXYGEN SATURATION: 93 % | HEIGHT: 71 IN | HEART RATE: 79 BPM

## 2022-03-23 DIAGNOSIS — K82.8 BILIARY DYSKINESIA: ICD-10-CM

## 2022-03-23 LAB — GLUCOSE BLD-MCNC: 150 MG/DL (ref 70–99)

## 2022-03-23 PROCEDURE — 47562 LAPAROSCOPIC CHOLECYSTECTOMY: CPT | Performed by: SURGERY

## 2022-03-23 PROCEDURE — 7100000001 HC PACU RECOVERY - ADDTL 15 MIN: Performed by: SURGERY

## 2022-03-23 PROCEDURE — 2580000003 HC RX 258: Performed by: SURGERY

## 2022-03-23 PROCEDURE — 7100000000 HC PACU RECOVERY - FIRST 15 MIN: Performed by: SURGERY

## 2022-03-23 PROCEDURE — 6360000002 HC RX W HCPCS: Performed by: SURGERY

## 2022-03-23 PROCEDURE — 7100000010 HC PHASE II RECOVERY - FIRST 15 MIN: Performed by: SURGERY

## 2022-03-23 PROCEDURE — 6370000000 HC RX 637 (ALT 250 FOR IP): Performed by: SURGERY

## 2022-03-23 PROCEDURE — 3600000014 HC SURGERY LEVEL 4 ADDTL 15MIN: Performed by: SURGERY

## 2022-03-23 PROCEDURE — 82962 GLUCOSE BLOOD TEST: CPT

## 2022-03-23 PROCEDURE — 7100000011 HC PHASE II RECOVERY - ADDTL 15 MIN: Performed by: SURGERY

## 2022-03-23 PROCEDURE — 6360000002 HC RX W HCPCS: Performed by: NURSE ANESTHETIST, CERTIFIED REGISTERED

## 2022-03-23 PROCEDURE — 3700000001 HC ADD 15 MINUTES (ANESTHESIA): Performed by: SURGERY

## 2022-03-23 PROCEDURE — 3700000000 HC ANESTHESIA ATTENDED CARE: Performed by: SURGERY

## 2022-03-23 PROCEDURE — 2500000003 HC RX 250 WO HCPCS: Performed by: SURGERY

## 2022-03-23 PROCEDURE — 3600000004 HC SURGERY LEVEL 4 BASE: Performed by: SURGERY

## 2022-03-23 PROCEDURE — 2709999900 HC NON-CHARGEABLE SUPPLY: Performed by: SURGERY

## 2022-03-23 PROCEDURE — 2500000003 HC RX 250 WO HCPCS: Performed by: NURSE ANESTHETIST, CERTIFIED REGISTERED

## 2022-03-23 PROCEDURE — 88304 TISSUE EXAM BY PATHOLOGIST: CPT | Performed by: PATHOLOGY

## 2022-03-23 RX ORDER — SODIUM CHLORIDE 0.9 % (FLUSH) 0.9 %
5-40 SYRINGE (ML) INJECTION PRN
Status: DISCONTINUED | OUTPATIENT
Start: 2022-03-23 | End: 2022-03-23 | Stop reason: HOSPADM

## 2022-03-23 RX ORDER — ROCURONIUM BROMIDE 10 MG/ML
INJECTION, SOLUTION INTRAVENOUS PRN
Status: DISCONTINUED | OUTPATIENT
Start: 2022-03-23 | End: 2022-03-23 | Stop reason: SDUPTHER

## 2022-03-23 RX ORDER — CEFAZOLIN SODIUM 2 G/100ML
2000 INJECTION, SOLUTION INTRAVENOUS ONCE
Status: COMPLETED | OUTPATIENT
Start: 2022-03-23 | End: 2022-03-23

## 2022-03-23 RX ORDER — SODIUM CHLORIDE, SODIUM LACTATE, POTASSIUM CHLORIDE, CALCIUM CHLORIDE 600; 310; 30; 20 MG/100ML; MG/100ML; MG/100ML; MG/100ML
INJECTION, SOLUTION INTRAVENOUS CONTINUOUS
Status: DISCONTINUED | OUTPATIENT
Start: 2022-03-23 | End: 2022-03-23 | Stop reason: HOSPADM

## 2022-03-23 RX ORDER — SODIUM CHLORIDE 9 MG/ML
25 INJECTION, SOLUTION INTRAVENOUS PRN
Status: DISCONTINUED | OUTPATIENT
Start: 2022-03-23 | End: 2022-03-23 | Stop reason: HOSPADM

## 2022-03-23 RX ORDER — DEXAMETHASONE SODIUM PHOSPHATE 4 MG/ML
INJECTION, SOLUTION INTRA-ARTICULAR; INTRALESIONAL; INTRAMUSCULAR; INTRAVENOUS; SOFT TISSUE PRN
Status: DISCONTINUED | OUTPATIENT
Start: 2022-03-23 | End: 2022-03-23 | Stop reason: SDUPTHER

## 2022-03-23 RX ORDER — HYDROCODONE BITARTRATE AND ACETAMINOPHEN 5; 325 MG/1; MG/1
1 TABLET ORAL EVERY 6 HOURS PRN
Qty: 12 TABLET | Refills: 0 | Status: SHIPPED | OUTPATIENT
Start: 2022-03-23 | End: 2022-03-26

## 2022-03-23 RX ORDER — FENTANYL CITRATE 50 UG/ML
INJECTION, SOLUTION INTRAMUSCULAR; INTRAVENOUS PRN
Status: DISCONTINUED | OUTPATIENT
Start: 2022-03-23 | End: 2022-03-23 | Stop reason: SDUPTHER

## 2022-03-23 RX ORDER — BUPIVACAINE HYDROCHLORIDE 5 MG/ML
INJECTION, SOLUTION EPIDURAL; INTRACAUDAL
Status: COMPLETED | OUTPATIENT
Start: 2022-03-23 | End: 2022-03-23

## 2022-03-23 RX ORDER — ONDANSETRON 2 MG/ML
INJECTION INTRAMUSCULAR; INTRAVENOUS PRN
Status: DISCONTINUED | OUTPATIENT
Start: 2022-03-23 | End: 2022-03-23 | Stop reason: SDUPTHER

## 2022-03-23 RX ORDER — LABETALOL HYDROCHLORIDE 5 MG/ML
INJECTION, SOLUTION INTRAVENOUS PRN
Status: DISCONTINUED | OUTPATIENT
Start: 2022-03-23 | End: 2022-03-23 | Stop reason: SDUPTHER

## 2022-03-23 RX ORDER — HYDROCODONE BITARTRATE AND ACETAMINOPHEN 5; 325 MG/1; MG/1
1 TABLET ORAL ONCE
Status: COMPLETED | OUTPATIENT
Start: 2022-03-23 | End: 2022-03-23

## 2022-03-23 RX ORDER — PROPOFOL 10 MG/ML
INJECTION, EMULSION INTRAVENOUS PRN
Status: DISCONTINUED | OUTPATIENT
Start: 2022-03-23 | End: 2022-03-23 | Stop reason: SDUPTHER

## 2022-03-23 RX ORDER — LIDOCAINE HYDROCHLORIDE 20 MG/ML
INJECTION, SOLUTION INTRAVENOUS PRN
Status: DISCONTINUED | OUTPATIENT
Start: 2022-03-23 | End: 2022-03-23 | Stop reason: SDUPTHER

## 2022-03-23 RX ORDER — SODIUM CHLORIDE 0.9 % (FLUSH) 0.9 %
5-40 SYRINGE (ML) INJECTION EVERY 12 HOURS SCHEDULED
Status: DISCONTINUED | OUTPATIENT
Start: 2022-03-23 | End: 2022-03-23 | Stop reason: HOSPADM

## 2022-03-23 RX ADMIN — PROPOFOL 30 MG: 10 INJECTION, EMULSION INTRAVENOUS at 09:22

## 2022-03-23 RX ADMIN — SODIUM CHLORIDE, POTASSIUM CHLORIDE, SODIUM LACTATE AND CALCIUM CHLORIDE: 600; 310; 30; 20 INJECTION, SOLUTION INTRAVENOUS at 08:59

## 2022-03-23 RX ADMIN — LABETALOL HYDROCHLORIDE 5 MG: 5 INJECTION, SOLUTION INTRAVENOUS at 10:05

## 2022-03-23 RX ADMIN — FENTANYL CITRATE 100 MCG: 50 INJECTION, SOLUTION INTRAMUSCULAR; INTRAVENOUS at 09:20

## 2022-03-23 RX ADMIN — ONDANSETRON HYDROCHLORIDE 4 MG: 4 INJECTION, SOLUTION INTRAMUSCULAR; INTRAVENOUS at 09:18

## 2022-03-23 RX ADMIN — LABETALOL HYDROCHLORIDE 5 MG: 5 INJECTION, SOLUTION INTRAVENOUS at 09:49

## 2022-03-23 RX ADMIN — HYDROCODONE BITARTRATE AND ACETAMINOPHEN 1 TABLET: 5; 325 TABLET ORAL at 11:07

## 2022-03-23 RX ADMIN — CEFAZOLIN SODIUM 2000 MG: 2 INJECTION, SOLUTION INTRAVENOUS at 09:17

## 2022-03-23 RX ADMIN — SUGAMMADEX 100 MG: 100 INJECTION, SOLUTION INTRAVENOUS at 10:05

## 2022-03-23 RX ADMIN — LABETALOL HYDROCHLORIDE 5 MG: 5 INJECTION, SOLUTION INTRAVENOUS at 09:32

## 2022-03-23 RX ADMIN — SUGAMMADEX 50 MG: 100 INJECTION, SOLUTION INTRAVENOUS at 10:00

## 2022-03-23 RX ADMIN — PROPOFOL 120 MG: 10 INJECTION, EMULSION INTRAVENOUS at 09:20

## 2022-03-23 RX ADMIN — DEXAMETHASONE SODIUM PHOSPHATE 4 MG: 4 INJECTION, SOLUTION INTRAMUSCULAR; INTRAVENOUS at 09:20

## 2022-03-23 RX ADMIN — LIDOCAINE HYDROCHLORIDE 50 MG: 20 INJECTION, SOLUTION INTRAVENOUS at 09:20

## 2022-03-23 RX ADMIN — ROCURONIUM BROMIDE 50 MG: 10 INJECTION INTRAVENOUS at 09:22

## 2022-03-23 ASSESSMENT — PULMONARY FUNCTION TESTS
PIF_VALUE: 31
PIF_VALUE: 21
PIF_VALUE: 33
PIF_VALUE: 4
PIF_VALUE: 18
PIF_VALUE: 27
PIF_VALUE: 21
PIF_VALUE: 21
PIF_VALUE: 3
PIF_VALUE: 32
PIF_VALUE: 20
PIF_VALUE: 0
PIF_VALUE: 21
PIF_VALUE: 32
PIF_VALUE: 33
PIF_VALUE: 31
PIF_VALUE: 35
PIF_VALUE: 20
PIF_VALUE: 33
PIF_VALUE: 9
PIF_VALUE: 22
PIF_VALUE: 33
PIF_VALUE: 21
PIF_VALUE: 32
PIF_VALUE: 29
PIF_VALUE: 21
PIF_VALUE: 21
PIF_VALUE: 20
PIF_VALUE: 9
PIF_VALUE: 21
PIF_VALUE: 24
PIF_VALUE: 21
PIF_VALUE: 2
PIF_VALUE: 21
PIF_VALUE: 21
PIF_VALUE: 33
PIF_VALUE: 31
PIF_VALUE: 21
PIF_VALUE: 21
PIF_VALUE: 1
PIF_VALUE: 2
PIF_VALUE: 21
PIF_VALUE: 18
PIF_VALUE: 33
PIF_VALUE: 22
PIF_VALUE: 21
PIF_VALUE: 21
PIF_VALUE: 18
PIF_VALUE: 21
PIF_VALUE: 26
PIF_VALUE: 1
PIF_VALUE: 35
PIF_VALUE: 6
PIF_VALUE: 21

## 2022-03-23 ASSESSMENT — ENCOUNTER SYMPTOMS
EYE ITCHING: 0
STRIDOR: 0
RECTAL PAIN: 0
CHOKING: 0
BACK PAIN: 0
ANAL BLEEDING: 0
COLOR CHANGE: 0
PHOTOPHOBIA: 0
CONSTIPATION: 0
EYE REDNESS: 0
APNEA: 0
SORE THROAT: 0

## 2022-03-23 ASSESSMENT — PAIN SCALES - GENERAL
PAINLEVEL_OUTOF10: 7
PAINLEVEL_OUTOF10: 0
PAINLEVEL_OUTOF10: 7

## 2022-03-23 ASSESSMENT — PAIN - FUNCTIONAL ASSESSMENT: PAIN_FUNCTIONAL_ASSESSMENT: 0-10

## 2022-03-23 NOTE — PROGRESS NOTES
Pt. Awake, alert, and oriented x 4. On room air, vs stable. Incisions to abdomen are closed with surgical glue, well-approximated. No drainage noted. Pt. Does c/o pain in abdomen at 7/10. He was medicated at 1107 with [place], per [facility]. Pt. Encouraged to walk and drink fluids after discharge. Reviewed discharge instructions with pt. And grandson. They verbalized understanding. Pt. Getting dressed and ready to be d/c'd home.

## 2022-03-23 NOTE — OP NOTE
Operative Note    Patient ID:  Griselda Crum  [id number]  56 y.o.  1955      Indications: This patient presents with a symptomatic gallbladder disease and will undergo laparoscopic cholecystecomy. Pre-operative Diagnosis: Biliary dyskinesia      Post-operative Diagnosis:  Same    Procedure:  Laparoscopic Cholecystectomy    Surgeon: Wilner Quarles MD    First Assistant: Mily Brooks PA-C  The  Use of a first assistant was necessary for the proper positioning, prepping, and draping of the patient, as well as the safe and expeditious execution of the case and closure of skin and subcutaneous tissues. Anesthesia: General endotracheal anesthesia    ASA Class: 2    Findings: same     Estimated Blood Loss:  Minimal           Drains:  none           Total IV Fluids: 500 ml           Specimens: Gallbladder             Complications:  None; patient tolerated the procedure well. Disposition: PACU - hemodynamically stable. Condition: stable        Procedure Details   The patient was seen again in the Holding Room. The risks, benefits, complications, treatment options, and expected outcomes were discussed with the patient. The possibilities of reaction to medication, pulmonary aspiration, perforation of viscus, bleeding, recurrent infection, finding a normal gallbladder, the need for additional procedures, failure to diagnose a condition, the possible need to convert to an open procedure, and creating a complication requiring transfusion or operation were discussed with the patient. The patient and/or family concurred with the proposed plan, giving informed consent. The site of surgery properly noted/marked. The patient was taken to Operating Room, identified as Griselda Crum and the procedure verified as Laparoscopic Cholecystectomy with possible Intraoperative Cholangiograms. A Time Out was held and the above information confirmed.      Prior to the induction of general anesthesia,  antibiotic prophylaxis was administered. General endotracheal anesthesia was then administered and tolerated well. After the induction, the abdomen was prepped in the usual sterile fashion. The patient was positioned in the supine position with the left armed comfortably tucked, along with some reverse trendelenberg. Local anesthetic agent was injected into the skin of the RUQ and an incision made. Using 5 mm optical trocar the peritoneum was entered without incidence. Pneumoperitoneum was then created with CO2 and tolerated well without any adverse changes in the patient's vital signs. Additional trocars were introduced under direct vision. All skin incisions were infiltrated with a local anesthetic agent before making the incision and placing the trocars. The patient was then positioned in reverse trendelenburg with the right side up. The gallbladder was identified, the fundus grasped and retracted cephalad. The gallbladder was minimal adhesions. Adhesions were lysed bluntly and with the electrocautery where indicated, taking care not to injure any adjacent organs or viscus. The infundibulum was grasped and retracted laterally, exposing the peritoneum overlying the triangle of Calot. This was then divided and exposed in a blunt fashion. The cystic duct was clearly identified and bluntly dissected circumferentially. The junctions of the gallbladder, cystic duct and common bile duct were clearly identified prior to the division of any linear structure. The cystic duct was then doubly ligated with surgical clips and on the patient side and singly clipped on the gallbladder side and divided. The cystic artery was identified, dissected free, ligated with clips and divided as well. The gallbladder was dissected from the liver bed in retrograde fashion with the electrocautery. The gallbladder was removed through Endobag. The liver bed was irrigated and inspected. Hemostasis was achieved with the electrocautery.  Pneumoperitoneum was completely reduced after viewing removal of the trocars under direct vision. The wound was thoroughly irrigated and the fascia  was then closed with a figure of eight suture; the skin was then closed with running absorbable suture and a sterile dressing was applied. Instrument, sponge, and needle counts were correct at closure and at the conclusion of the case.      Daniel Bowman MD

## 2022-03-23 NOTE — H&P
Brook Chan MD      General Surgery       Subjective:     Patient is a 61 y.o.  male scheduled for lap jackie. Indications for procedure are biliary dyskinesia    EF was   Impression   1.  Gallbladder ejection fraction is low at 37%.  In the proper clinical   setting, this is consistent with functional gallbladder disorder.       2. No acute cholecystitis.             .      Discussed Blood/Blood Products: yes    Patient Active Problem List    Diagnosis Date Noted    Cervical spondylosis with radiculopathy 09/09/2021    Status post left rotator cuff repair 06/17/2021     Past Medical History:   Diagnosis Date    Acid reflux     Arthritis     Diabetes mellitus ([address]     dx since approx 2015\"    Full dentures     Hemochromatosis     \"have to much iron- last time they removed some blood was in Jan 2021\"follow with Dr Catalina Guerra in [place] for this    Hypertension     \"on medication since 2016\" follows with PCP    Wears glasses       Past Surgical History:   Procedure Laterality Date    COLONOSCOPY  05/08/2019    normal colonoscopy    COLONOSCOPY N/A 5/8/2019    COLORECTAL CANCER SCREENING, HIGH RISK performed by Elizabeth Quinonez MD at [address] [facility] ARTHROSCOPY Left 5/10/2021    LEFT SHOULDER ARTHROSCOPY, ROTATOR CUFF REPAIR, SUBACROMIAL DECOMPRESSION performed by Emma Walker MD at [address] [place] [address]        Prior to Admission medications    Medication Sig Start Date End Date Taking?  Authorizing Provider   TRULICITY 7.59 FT/0.4ND SOPN  10/13/21   Historical Provider, MD   TRULICITY 1.5 LF/1.0SQ SOPN  11/10/21   Historical Provider, MD   furosemide (LASIX) 20 MG tablet  10/22/21   Historical Provider, MD   lisinopril (PRINIVIL;ZESTRIL) 40 MG tablet  10/22/21   Historical Provider, MD   methocarbamol (ROBAXIN) 750 MG tablet TAKE 1 TABLET BY MOUTH 4 TIMES DAILY AS NEEDED 7/3/21   Historical Provider, MD   gabapentin (NEURONTIN) 300 MG capsule Take 300 mg by mouth 4 times daily. Historical Provider, MD   blood glucose test strips (ONETOUCH ULTRA) strip CHECK BLOOD GLUCOSE TWICE DAILY 4/12/21   Historical Provider, MD   ibuprofen (ADVIL;MOTRIN) 800 MG tablet Take 1 tablet by mouth 3 times daily (with meals) 2/26/21   Khadra Day PA-C   ONE TOUCH ULTRASOFT LANCETS MISC  12/18/20   Historical Provider, MD   pantoprazole (PROTONIX) 40 MG tablet 40 mg daily  1/11/21   Historical Provider, MD   sitaGLIPtin (JANUVIA) 100 MG tablet Take 100 mg by mouth daily    Historical Provider, MD   carvedilol (COREG) 12.5 MG tablet Take 12.5 mg by mouth 2 times daily (with meals)    Historical Provider, MD   metFORMIN (GLUCOPHAGE) 1000 MG tablet Take 1,000 mg by mouth 2 times daily (with meals). Historical Provider, MD   glimepiride (AMARYL) 4 MG tablet Take 4 mg by mouth 2 times daily     Historical Provider, MD   lisinopril (PRINIVIL;ZESTRIL) 20 MG tablet Take 20 mg by mouth daily. Historical Provider, MD     No Known Allergies   Social History     Tobacco Use    Smoking status: Never Smoker    Smokeless tobacco: Never Used   Substance Use Topics    Alcohol use: Not Currently      Family History   Problem Relation Age of Onset    Cancer Father     Cancer Brother     Asthma Mother         copd          Review of Systems  Review of Systems   Constitutional: Negative for chills and fever. HENT: Negative for ear pain, mouth sores, sore throat and tinnitus. Eyes: Negative for photophobia, redness and itching. Respiratory: Negative for apnea, choking and stridor. Cardiovascular: Negative for chest pain and palpitations. Gastrointestinal: Negative for anal bleeding, constipation and rectal pain. Endocrine: Negative for polydipsia. Genitourinary: Negative for enuresis, flank pain and hematuria. Musculoskeletal: Negative for back pain, joint swelling and myalgias. Skin: Negative for color change and pallor.    Allergic/Immunologic: Negative for environmental

## 2022-03-23 NOTE — ANESTHESIA PRE PROCEDURE
Department of Anesthesiology  Preprocedure Note       Name:  Mariana Castro   Age:  61 y.o.  :  1955                                          MRN:  [id number]         Date:  3/23/2022      Surgeon: Francia Robles):  Corona Mtz MD    Procedure: Procedure(s):  CHOLECYSTECTOMY LAPAROSCOPIC    Medications prior to admission:   Prior to Admission medications    Medication Sig Start Date End Date Taking? Authorizing Provider   TRULICITY 9.79 UF/4.7HT SOPN  10/13/21   Historical Provider, MD   TRULICITY 1.5 UX/3.3DH SOPN  11/10/21   Historical Provider, MD   furosemide (LASIX) 20 MG tablet  10/22/21   Historical Provider, MD   lisinopril (PRINIVIL;ZESTRIL) 40 MG tablet  10/22/21   Historical Provider, MD   methocarbamol (ROBAXIN) 750 MG tablet TAKE 1 TABLET BY MOUTH 4 TIMES DAILY AS NEEDED 7/3/21   Historical Provider, MD   gabapentin (NEURONTIN) 300 MG capsule Take 300 mg by mouth 4 times daily. Historical Provider, MD   blood glucose test strips (ONETOUCH ULTRA) strip CHECK BLOOD GLUCOSE TWICE DAILY 21   Historical Provider, MD   ibuprofen (ADVIL;MOTRIN) 800 MG tablet Take 1 tablet by mouth 3 times daily (with meals) 21   Yung Fu PA-C   ONE TOUCH ULTRASOFT LANCETS MISC  20   Historical Provider, MD   pantoprazole (PROTONIX) 40 MG tablet 40 mg daily  21   Historical Provider, MD   sitaGLIPtin (JANUVIA) 100 MG tablet Take 100 mg by mouth daily    Historical Provider, MD   carvedilol (COREG) 12.5 MG tablet Take 12.5 mg by mouth 2 times daily (with meals)    Historical Provider, MD   metFORMIN (GLUCOPHAGE) 1000 MG tablet Take 1,000 mg by mouth 2 times daily (with meals). Historical Provider, MD   glimepiride (AMARYL) 4 MG tablet Take 4 mg by mouth 2 times daily     Historical Provider, MD   lisinopril (PRINIVIL;ZESTRIL) 20 MG tablet Take 20 mg by mouth daily.     Historical Provider, MD       Current medications:    Current Facility-Administered Medications   Medication Dose Route Frequency Provider Last Rate Last Admin    ceFAZolin (ANCEF) 2000 mg in dextrose 4 % 100 mL IVPB (premix)  2,000 mg IntraVENous Once Jeanette Aguilar MD        lactated ringers infusion   IntraVENous Continuous Jeanette Aguilar  mL/hr at 03/23/22 0859 New Bag at 03/23/22 0859    sodium chloride flush 0.9 % injection 5-40 mL  5-40 mL IntraVENous PRN Jeanette Aguilar MD           Allergies:  No Known Allergies    Problem List:    Patient Active Problem List   Diagnosis Code    Status post left rotator cuff repair Z98.890    Cervical spondylosis with radiculopathy M47.22       Past Medical History:        Diagnosis Date    Acid reflux     Arthritis     Diabetes mellitus ([facility] [address]     dx since approx 2015\"    Full dentures     Hemochromatosis     \"have to much iron- last time they removed some blood was in Jan 2021\"follow with Dr Lulu Nicole in [place] for this    Hypertension     \"on medication since 2016\" follows with PCP    Wears glasses        Past Surgical History:        Procedure Laterality Date    COLONOSCOPY  05/08/2019    normal colonoscopy    COLONOSCOPY N/A 5/8/2019    COLORECTAL CANCER SCREENING, HIGH RISK performed by Marques Pelaez MD at [address] [facility] ARTHROSCOPY Left 5/10/2021    LEFT SHOULDER ARTHROSCOPY, ROTATOR CUFF REPAIR, SUBACROMIAL DECOMPRESSION performed by Ganga Hall MD at [facility] History:    Social History     Tobacco Use    Smoking status: Never Smoker    Smokeless tobacco: Never Used   Substance Use Topics    Alcohol use: Not Currently                                Counseling given: Not Answered      Vital Signs (Current):   Vitals:    03/23/22 0844   BP: (!) 152/89   Pulse: 94   Resp: 16   Temp: 36.4 °C (97.6 °F)   TempSrc: Temporal   SpO2: 98%   Weight: 231 lb (104.8 kg)   Height: 5' 11\" (1.803 m)                                              BP Readings from Last 3 Encounters:   03/23/22 (!) 152/89   03/21/22 124/68   05/10/21 (!) 60/53       NPO Status: Time of last liquid consumption: 2000                        Time of last solid consumption: 1800                        Date of last liquid consumption: 03/22/22                        Date of last solid food consumption: 03/22/22    BMI:   Wt Readings from Last 3 Encounters:   03/23/22 231 lb (104.8 kg)   03/21/22 231 lb 3.2 oz (104.9 kg)   02/02/22 234 lb (106.1 kg)     Body mass index is 32.22 kg/m². CBC:   Lab Results   Component Value Date    WBC 7.1 05/04/2021    RBC 4.09 05/04/2021    HGB 12.4 05/04/2021    HCT 37.3 05/04/2021    MCV 91.2 05/04/2021    RDW 12.4 05/04/2021     05/04/2021       CMP:   Lab Results   Component Value Date     05/04/2021    K 4.9 05/04/2021    CL 98 05/04/2021    CO2 24 05/04/2021    BUN 28 05/04/2021    CREATININE 1.2 05/04/2021    GFRAA >60 05/04/2021    LABGLOM >60 05/04/2021    GLUCOSE 311 05/04/2021    PROT 7.4 09/24/2019    CALCIUM 10.2 05/04/2021    BILITOT 0.2 09/24/2019    ALKPHOS 98 09/24/2019    AST 32 09/24/2019    ALT 37 09/24/2019       POC Tests:   Recent Labs     03/23/22  0900   POCGLU 150*       Coags: No results found for: PROTIME, INR, APTT    HCG (If Applicable): No results found for: PREGTESTUR, PREGSERUM, HCG, HCGQUANT     ABGs: No results found for: PHART, PO2ART, UNF8XUX, CFF9SWA, BEART, Q8IAAXWF     Type & Screen (If Applicable):  No results found for: LABABO, LABRH    Drug/Infectious Status (If Applicable):  No results found for: HIV, HEPCAB    COVID-19 Screening (If Applicable):   Lab Results   Component Value Date    COVID19 NOT DETECTED 05/04/2021           Anesthesia Evaluation  Patient summary reviewed  Airway: Mallampati: II  TM distance: <3 FB   Neck ROM: full  Mouth opening: > = 3 FB Dental:    (+) edentulous      Pulmonary:Negative Pulmonary ROS             Patient did not smoke on day of surgery.                  Cardiovascular:  Exercise tolerance: good (>4 METS),   (+) hypertension: mild,          Beta Blocker:  Dose within 24 Hrs         Neuro/Psych:                ROS comment: Neck pain  GI/Hepatic/Renal:   (+) GERD: well controlled, morbid obesity         ROS comment: Biliary dyskinesia. Endo/Other:    (+) DiabetesType II DM, poorly controlled, , blood dyscrasia: arthritis: OA., . Pt had no PAT visit        ROS comment: hemochromatosis Abdominal:             Vascular: negative vascular ROS. Other Findings:             Anesthesia Plan      general     ASA 2       Induction: intravenous. MIPS: Postoperative opioids intended and Prophylactic antiemetics administered. Anesthetic plan and risks discussed with patient. Use of blood products discussed with patient whom consented to blood products. Plan discussed with CRNA. ROSCOE Bai - DELMY   3/23/2022         Pre Anesthesia Assessment complete.  Chart reviewed on 3/23/2022

## 2022-03-23 NOTE — PROGRESS NOTES
Pt. Arrived in PACU via cart from the OR. Attached to monitor, vs stable. On room air. Brakes applied, side rails up x 2. Pt. Charley Matters, but arouses to voice and touch. Denies pain or nausea at this time. X 3 lap sites to abdomen, closed with surgical glue and well-approximated. No drainage noted. Warm blanket placed on abdomen for comfort, and splinting when pt. Coughs. SCDs to BLEs. IV infusing without difficulty. Bedside report received from Gonzalo Patiño RN and DELMY Atkins. Will continue to monitor via bedside.

## 2022-03-23 NOTE — PROGRESS NOTES
Pt. Is awake, alert, and oriented x 4. On room air, vs stable. Pt. Eating ice chips and tolerating well. Denies any nausea. Does c/o some mild, tolerable pain in abdomen. X 3 lap sites to abdomen, well-approximated, closed with glue. No drainage noted. Folded, warm blanket on abdomen for support. SCDs to BLEs. IV infusing without difficulty. Pt. Ready for d/c from PACU.

## 2022-03-27 NOTE — PROGRESS NOTES
Chief Complaint   Patient presents with    New Patient     RUQ PAIN HIDA 37 % REF IN MEDIA       SUBJECTIVE:  HPI: Patient complains of abdominal pain. Pain is located in the RUQ with radiation to the back. The pain is described as cramping, pressure-like, sharp and shooting. Onset was 6 months ago. Symptoms havebeen unchanged since. Aggravating factors: eating and fatty foods. Associated symptoms: nausea and vomiting. The patient denies chills and fever. Ultrasound the abdomen was negative. HIDA scan showed  Impression   1.  Gallbladder ejection fraction is low at 37%.  In the proper clinical   setting, this is consistent with functional gallbladder disorder.       2. No acute cholecystitis.                 I have reviewed the patient's(pertinent information to this visit) medical history, family history(scanned in  the Media tab under \"patient questioner\"), social history and review of systems with the patienttoday in the office.           Past Surgical History:   Procedure Laterality Date    CHOLECYSTECTOMY  03/23/2022    in [place] with Dr. Jessica Robles, LAPAROSCOPIC N/A 3/23/2022    CHOLECYSTECTOMY LAPAROSCOPIC performed by Madalyn Suazo MD at [address]  05/08/2019    normal colonoscopy    COLONOSCOPY N/A 5/8/2019    COLORECTAL CANCER SCREENING, HIGH RISK performed by Sorin Craig MD at [address] [facility] ARTHROSCOPY Left 5/10/2021    LEFT SHOULDER ARTHROSCOPY, ROTATOR CUFF REPAIR, SUBACROMIAL DECOMPRESSION performed by Arlene Frye MD at [address] [facility]       Past Medical History:   Diagnosis Date    Acid reflux     Arthritis     Diabetes mellitus ([address]     dx since approx 2015\"    Full dentures     Hemochromatosis     \"have to much iron- last time they removed some blood was in Jan 2021\"follow with Dr Shadi Perez in [facility] for this    Hypertension     \"on medication since 2016\" follows with PCP    Wears glasses      Family History   Problem Relation Age of Onset    Cancer Father     Cancer Brother     Asthma Mother         copd     Social History     Socioeconomic History    Marital status:      Spouse name: Not on file    Number of children: Not on file    Years of education: Not on file    Highest education level: Not on file   Occupational History    Not on file   Tobacco Use    Smoking status: Never Smoker    Smokeless tobacco: Never Used   Vaping Use    Vaping Use: Never used   Substance and Sexual Activity    Alcohol use: Not Currently    Drug use: No    Sexual activity: Not on file   Other Topics Concern    Not on file   Social History Narrative    Not on file     Social Determinants of Health     Financial Resource Strain:     Difficulty of Paying Living Expenses: Not on file   Food Insecurity:     Worried About [employer] in the Last Year: Not on file    Higinio of Food in the Last Year: Not on file   Transportation Needs:     Lack of Transportation (Medical): Not on file    Lack of Transportation (Non-Medical):  Not on file   Physical Activity:     Days of Exercise per Week: Not on file    Minutes of Exercise per Session: Not on file   Stress:     Feeling of Stress : Not on file   Social Connections:     Frequency of Communication with Friends and Family: Not on file    Frequency of Social Gatherings with Friends and Family: Not on file    Attends Mu-ism Services: Not on file    Active Member of [address] [place] [address] [employer] or Organizations: Not on file    Attends Club or Organization Meetings: Not on file    Marital Status: Not on file   Intimate Partner Violence:     Fear of Current or Ex-Partner: Not on file    Emotionally Abused: Not on file    Physically Abused: Not on file    Sexually Abused: Not on file   Housing Stability:     Unable to Pay for Housing in the Last Year: Not on file    Number of [place] in the Last Year: Not on file    Unstable Housing in the Last Year: Not on file       Current Outpatient Medications   Medication Sig Dispense Refill    TRULICITY 1.17 IH/7.9IK SOPN       TRULICITY 1.5 UK/9.9VY SOPN       furosemide (LASIX) 20 MG tablet       lisinopril (PRINIVIL;ZESTRIL) 40 MG tablet       methocarbamol (ROBAXIN) 750 MG tablet TAKE 1 TABLET BY MOUTH 4 TIMES DAILY AS NEEDED      gabapentin (NEURONTIN) 300 MG capsule Take 300 mg by mouth 4 times daily.  blood glucose test strips (ONETOUCH ULTRA) strip CHECK BLOOD GLUCOSE TWICE DAILY      ibuprofen (ADVIL;MOTRIN) 800 MG tablet Take 1 tablet by mouth 3 times daily (with meals) 90 tablet 0    ONE TOUCH ULTRASOFT LANCETS MISC       pantoprazole (PROTONIX) 40 MG tablet 40 mg daily       sitaGLIPtin (JANUVIA) 100 MG tablet Take 100 mg by mouth daily      carvedilol (COREG) 12.5 MG tablet Take 12.5 mg by mouth 2 times daily (with meals)      metFORMIN (GLUCOPHAGE) 1000 MG tablet Take 1,000 mg by mouth 2 times daily (with meals).  glimepiride (AMARYL) 4 MG tablet Take 4 mg by mouth 2 times daily       lisinopril (PRINIVIL;ZESTRIL) 20 MG tablet Take 20 mg by mouth daily. No current facility-administered medications for this visit. No Known Allergies    Review of Systems:         Review of Systems      OBJECTIVE:  Physical Exam:    /68 (Site: Right Upper Arm, Position: Sitting, Cuff Size: Medium Adult)   Pulse 93   Ht 5' 11\" (1.803 m)   Wt 231 lb 3.2 oz (104.9 kg)   SpO2 96%   BMI 32.25 kg/m²      Physical Exam      ASSESSMENT:     1. Biliary dyskinesia          PLAN:  Treatment: We will proceed with laparoscopic cholecystectomy. .  Patient counseled on risks, benefits, and alternatives oftreatment plan at length today. Patient states an understanding and willingness to proceed with plan. No orders of the defined types were placed in this encounter. No orders of the defined types were placed in this encounter. Follow Up:  No follow-ups on file.        Serafin Victoria MD

## 2022-04-04 ENCOUNTER — OFFICE VISIT (OUTPATIENT)
Dept: SURGERY | Age: 67
End: 2022-04-04

## 2022-04-04 VITALS
SYSTOLIC BLOOD PRESSURE: 127 MMHG | HEIGHT: 71 IN | HEART RATE: 88 BPM | WEIGHT: 229.7 LBS | BODY MASS INDEX: 32.16 KG/M2 | DIASTOLIC BLOOD PRESSURE: 88 MMHG | OXYGEN SATURATION: 100 %

## 2022-04-04 DIAGNOSIS — Z48.89 ENCOUNTER FOR POSTOPERATIVE CARE: Primary | ICD-10-CM

## 2022-04-04 PROCEDURE — APPNB30 APP NON BILLABLE TIME 0-30 MINS: Performed by: PHYSICIAN ASSISTANT

## 2022-04-04 PROCEDURE — 99024 POSTOP FOLLOW-UP VISIT: CPT | Performed by: PHYSICIAN ASSISTANT

## 2022-04-04 ASSESSMENT — PATIENT HEALTH QUESTIONNAIRE - PHQ9
SUM OF ALL RESPONSES TO PHQ QUESTIONS 1-9: 0
2. FEELING DOWN, DEPRESSED OR HOPELESS: 0
1. LITTLE INTEREST OR PLEASURE IN DOING THINGS: 0
SUM OF ALL RESPONSES TO PHQ9 QUESTIONS 1 & 2: 0

## 2022-04-04 NOTE — PROGRESS NOTES
Chief Complaint   Patient presents with    Post-Op Check      1st po lap jackie 3/23/22          SUBJECTIVE:  Patient presents today for his 1st post op visit following lap jackie. Pt reports that pain mild with certain movements  Pt is  tolerating a regular diet. BMs are WNL    Incisions: minbruising and no discharge. Some glue intact.     Past Surgical History:   Procedure Laterality Date    CHOLECYSTECTOMY  03/23/2022    in [place] with Dr. Radha Knowles, LAPAROSCOPIC N/A 3/23/2022    CHOLECYSTECTOMY LAPAROSCOPIC performed by Carleen Nyhan, MD at [address]  05/08/2019    normal colonoscopy    COLONOSCOPY N/A 5/8/2019    COLORECTAL CANCER SCREENING, HIGH RISK performed by Lorri Cano MD at [address] [facility] [address] ARTHROSCOPY Left 5/10/2021    LEFT SHOULDER ARTHROSCOPY, ROTATOR CUFF REPAIR, SUBACROMIAL DECOMPRESSION performed by Gato Fernandez MD at [facility]       Past Medical History:   Diagnosis Date    Acid reflux     Arthritis     Diabetes mellitus ([address]     dx since approx 2015\"    Full dentures     Hemochromatosis     \"have to much iron- last time they removed some blood was in Jan 2021\"follow with Dr Eh Padilla in [facility] for this    Hypertension     \"on medication since 2016\" follows with PCP    Wears glasses      Family History   Problem Relation Age of Onset    Cancer Father     Cancer Brother     Asthma Mother         copd     Social History     Socioeconomic History    Marital status:      Spouse name: Not on file    Number of children: Not on file    Years of education: Not on file    Highest education level: Not on file   Occupational History    Not on file   Tobacco Use    Smoking status: Never Smoker    Smokeless tobacco: Never Used   Vaping Use    Vaping Use: Never used   Substance and Sexual Activity    Alcohol use: Not Currently    Drug use: No    Sexual activity: Not on file   Other Topics Concern    Not on file   Social History Narrative    Not on file     Social Determinants of Health     Financial Resource Strain:     Difficulty of Paying Living Expenses: Not on file   Food Insecurity:     Worried About Running Out of Food in the Last Year: Not on file    Higinio of Food in the Last Year: Not on file   Transportation Needs:     Lack of Transportation (Medical): Not on file    Lack of Transportation (Non-Medical): Not on file   Physical Activity:     Days of Exercise per Week: Not on file    Minutes of Exercise per Session: Not on file   Stress:     Feeling of Stress : Not on file   Social Connections:     Frequency of Communication with Friends and Family: Not on file    Frequency of Social Gatherings with Friends and Family: Not on file    Attends Buddhist Services: Not on file    Active Member of [address] [place] [address] [employer] or Organizations: Not on file    Attends Club or Organization Meetings: Not on file    Marital Status: Not on file   Intimate Partner Violence:     Fear of Current or Ex-Partner: Not on file    Emotionally Abused: Not on file    Physically Abused: Not on file    Sexually Abused: Not on file   Housing Stability:     Unable to Pay for Housing in the Last Year: Not on file    Number of [place] in the Last Year: Not on file    Unstable Housing in the Last Year: Not on file       OBJECTIVE:  Physical Exam  Constitutional:       Appearance: He is well-developed. HENT:      Head: Normocephalic. Eyes:      Pupils: Pupils are equal, round, and reactive to light. Cardiovascular:      Rate and Rhythm: Normal rate. Pulmonary:      Effort: Pulmonary effort is normal.   Abdominal:      General: There is no distension. Palpations: Abdomen is soft. There is no mass. Tenderness: There is no abdominal tenderness. There is no guarding or rebound. Comments: Lap incisions well healed   Musculoskeletal:         General: Normal range of motion.       Cervical back: Normal range of motion and neck supple. Skin:     General: Skin is warm. Neurological:      Mental Status: He is alert and oriented to person, place, and time. Path reveals:   Final Pathologic Diagnosis:   Gallbladder; cholecystectomy:   -     Patchy mild cholesterolosis. -     Benign pericystic lymph node showing lipid granulomas. ASSESSMENT:  Patient doing well on this post operative check. Wounds well healed. 1. Encounter for postoperative care        PLAN:  Pt is to increase activities as tolerated. No orders of the defined types were placed in this encounter. No orders of the defined types were placed in this encounter. Follow Up: Return if symptoms worsen or fail to improve.     Lolly Webb PA-C

## 2022-05-02 NOTE — DISCHARGE SUMMARY
Outpatient Physical Therapy           [place]           [] Phone: [phone]   Fax: [phone]  [facility]           [x] Phone: [phone]   Fax: [phone]      Varghese Tarango MD     PCP: Anirudh Liriano MD:       From: Margret Castillo, PT, PT     Patient: José Luis Martinez                    : 1955  Diagnosis:  Other specified postprocedural states [Z98.890]    No data recorded     Date: 2022  Treatment Diagnosis:         []  Progress Note                [x]  Discharge Note    Evaluation Date:  21   Total Visits to date:  24  Cancels/No-shows to date:      Subjective:  21 Patient rates his shoulder pain 3/10 today      Plan of Care/Treatment to date:  [x] Therapeutic Exercise    [] Modalities:  [x] Therapeutic Activity     [] Ultrasound  [x] Electrical Stimulation  [] Gait Training      [] Cervical Traction   [] Lumbar Traction  [x] Neuromuscular Re-education  [] Cold/hotpack [] Iontophoresis  [x] Instruction in HEP      Other:  [x] Manual Therapy       [x]  Vasopneumatic  [] Aquatic Therapy       []   Dry Needle Therapy                      Objective/Significant Findings At Last Visit/Comments:   NDI: 22.72  : L 50,45,55 AV lbs  R: 75,60,70 av.33 lbs  Shoulder:    Left    Flexion      325  AGENMOI    ABduction       450 YESRUTE        Assessment:         Goal Status:  [] Achieved [x] Partially Achieved  [] Not Achieved   Short term goals to be achieved by 2021:  Short term goal 1: Pt will report compliance with current HEP as prescribed in order to improve ROM and strength. - MET, discharge goal.   Short term goal 2:  Pt will demonstrate AROM L shoulder flexion to at least 120 degrees in order to improve ROM  - MET, discharge goal.   Short term goal 3: Pt will demonstrate AROM L shoulder abduction to at least 100 degrees in order to improve ROM.   - MET, discharge goal.   Short term goal 4: Pt will demonstrate AROM L UE supination to at least 80 degrees in order to improve ROM. - not met, continue. Short term goal 5: Pt will demonstrate L  strength to at least 75 lbs in order to improve strength. - not met, continue. Short term goal 6: Pt will demonstrate a score of no more than 15 on the Quick DASH in order to improve quality of life. - not met, continue. [x] Patient stopped PT 10/26/21  Electronically signed by:  Jody Quijano, PT, , 5/2/2022, 2:50 PM    If you have any questions or concerns, please don't hesitate to call.   Thank you for your referral.

## 2022-08-14 ENCOUNTER — APPOINTMENT (OUTPATIENT)
Dept: CT IMAGING | Age: 67
End: 2022-08-14
Payer: MEDICAID

## 2022-08-14 ENCOUNTER — HOSPITAL ENCOUNTER (EMERGENCY)
Age: 67
Discharge: HOME | End: 2022-08-14
Attending: EMERGENCY MEDICINE
Payer: MEDICAID

## 2022-08-14 VITALS
SYSTOLIC BLOOD PRESSURE: 179 MMHG | BODY MASS INDEX: 31.38 KG/M2 | RESPIRATION RATE: 16 BRPM | TEMPERATURE: 98.9 F | HEART RATE: 100 BPM | DIASTOLIC BLOOD PRESSURE: 89 MMHG | OXYGEN SATURATION: 97 % | WEIGHT: 225 LBS
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
## 2022-08-14 PROCEDURE — 99284 EMERGENCY DEPT VISIT MOD MDM: CPT

## 2022-08-14 PROCEDURE — 71250 CT THORAX DX C-: CPT

## 2022-08-14 PROCEDURE — 6370000000 HC RX 637 (ALT 250 FOR IP): Performed by: EMERGENCY MEDICINE

## 2022-08-14 PROCEDURE — 74176 CT ABD & PELVIS W/O CONTRAST: CPT

## 2022-08-14 RX ORDER — IBUPROFEN 600 MG/1
600 TABLET ORAL 3 TIMES DAILY PRN
Qty: 30 TABLET | Refills: 0 | Status: SHIPPED | OUTPATIENT
Start: 2022-08-14

## 2022-08-14 RX ORDER — LIDOCAINE 50 MG/G
1 PATCH TOPICAL DAILY
Qty: 10 PATCH | Refills: 0 | Status: SHIPPED | OUTPATIENT
Start: 2022-08-14 | End: 2022-08-24

## 2022-08-14 RX ORDER — LIDOCAINE 4 G/G
1 PATCH TOPICAL ONCE
Status: DISCONTINUED | OUTPATIENT
Start: 2022-08-14 | End: 2022-08-14 | Stop reason: HOSPADM

## 2022-08-14 RX ORDER — IBUPROFEN 600 MG/1
600 TABLET ORAL ONCE
Status: COMPLETED | OUTPATIENT
Start: 2022-08-14 | End: 2022-08-14

## 2022-08-14 RX ORDER — HYDROCODONE BITARTRATE AND ACETAMINOPHEN 5; 325 MG/1; MG/1
1 TABLET ORAL EVERY 8 HOURS PRN
Qty: 9 TABLET | Refills: 0 | Status: SHIPPED | OUTPATIENT
Start: 2022-08-14 | End: 2022-08-17

## 2022-08-14 RX ADMIN — IBUPROFEN 600 MG: 600 TABLET ORAL at 21:14

## 2022-08-14 ASSESSMENT — ENCOUNTER SYMPTOMS
SHORTNESS OF BREATH: 0
ABDOMINAL PAIN: 1
VOMITING: 0
COUGH: 0
BACK PAIN: 0
EYES NEGATIVE: 1
NAUSEA: 0
EYE PAIN: 0
RHINORRHEA: 0
SORE THROAT: 0

## 2022-08-14 ASSESSMENT — PAIN SCALES - GENERAL: PAINLEVEL_OUTOF10: 10

## 2022-08-14 ASSESSMENT — PAIN - FUNCTIONAL ASSESSMENT: PAIN_FUNCTIONAL_ASSESSMENT: 0-10

## 2022-08-14 ASSESSMENT — PAIN DESCRIPTION - LOCATION: LOCATION: RIB CAGE

## 2022-08-14 NOTE — ED PROVIDER NOTES
[id number]      Pt Name: Jayashree Multani  MRN: [id number]  [place] 1955  Date of evaluation: 8/14/2022  Provider: Terie [place], MD    CHIEF COMPLAINT       Chief Complaint   Patient presents with    Fall    Rib Injury         HISTORY OF PRESENT ILLNESS      Jayashree Multani is a 61 y.o. male who presents to the emergency department  for   Chief Complaint   Patient presents with    Fall    Rib Injury       68-year-old male presents with pain in his lower chest wall and upper abdomen. He had a fall earlier today. States he was getting out of a small trailer when the floor gave way and he fell forward striking his chest wall and upper abdomen. Denies any head or neck injury. No loss of consciousness. He is not amnestic about events. He has been ambulatory since the accident. Does endorse some pain with deep breathing. No other injuries. He is not have any paresthesias. GCS of 15. He is amatory without difficulty. For review of records, he is not anticoagulated. Nursing Notes, Triage Notes & Vital Signs were reviewed. REVIEW OF SYSTEMS    (2-9 systems for level 4, 10 or more for level 5)     Review of Systems   Constitutional:  Negative for chills and fever. HENT:  Negative for congestion, rhinorrhea and sore throat. Eyes: Negative. Negative for pain. Respiratory:  Negative for cough and shortness of breath. Cardiovascular:  Positive for chest pain. Gastrointestinal:  Positive for abdominal pain. Negative for nausea and vomiting. Endocrine: Negative for polydipsia and polyuria. Genitourinary:  Negative for dysuria and flank pain. Musculoskeletal:  Negative for back pain and neck pain. Skin:  Negative for pallor and wound. Neurological:  Negative for dizziness, facial asymmetry, light-headedness, numbness and headaches. Psychiatric/Behavioral:  Negative for confusion.       Except as noted above the remainder of the review of systems was reviewed and negative. PAST MEDICAL HISTORY     Past Medical History:   Diagnosis Date    Acid reflux     Arthritis     Diabetes mellitus ([facility] [place] [address]     dx since approx 2015\"    Full dentures     Hemochromatosis     \"have to much iron- last time they removed some blood was in Jan 2021\"follow with Dr Stacy Fernández in [facility] for this    Hypertension     \"on medication since 2016\" follows with PCP    Wears glasses        Prior to Admission medications    Medication Sig Start Date End Date Taking? Authorizing Provider   TRULICITY 6.68 FRED/6.1UP SOPN  10/13/21   Historical Provider, MD   TRULICITY 1.5 QZ/7.2JI SOPN  11/10/21   Historical Provider, MD   furosemide (LASIX) 20 MG tablet  10/22/21   Historical Provider, MD   lisinopril (PRINIVIL;ZESTRIL) 40 MG tablet  10/22/21   Historical Provider, MD   methocarbamol (ROBAXIN) 750 MG tablet TAKE 1 TABLET BY MOUTH 4 TIMES DAILY AS NEEDED  Patient not taking: Reported on 4/4/2022 7/3/21   Historical Provider, MD   gabapentin (NEURONTIN) 300 MG capsule Take 300 mg by mouth 4 times daily. Historical Provider, MD   blood glucose test strips (ONETOUCH ULTRA) strip CHECK BLOOD GLUCOSE TWICE DAILY 4/12/21   Historical Provider, MD   ibuprofen (ADVIL;MOTRIN) 800 MG tablet Take 1 tablet by mouth 3 times daily (with meals) 2/26/21   Vanessa Moody PA-C   ONE TOUCH ULTRASOFT LANCETS MISC  12/18/20   Historical Provider, MD   pantoprazole (PROTONIX) 40 MG tablet 40 mg daily  1/11/21   Historical Provider, MD   sitaGLIPtin (JANUVIA) 100 MG tablet Take 100 mg by mouth daily    Historical Provider, MD   carvedilol (COREG) 12.5 MG tablet Take 12.5 mg by mouth 2 times daily (with meals)    Historical Provider, MD   metFORMIN (GLUCOPHAGE) 1000 MG tablet Take 1,000 mg by mouth 2 times daily (with meals).     Historical Provider, MD   glimepiride (AMARYL) 4 MG tablet Take 4 mg by mouth 2 times daily     Historical Provider, MD   lisinopril (PRINIVIL;ZESTRIL) 20 MG tablet Take 20 mg by mouth daily. Patient not taking: Reported on 4/4/2022    Historical Provider, MD        Patient Active Problem List   Diagnosis    Status post left rotator cuff repair    Cervical spondylosis with radiculopathy         SURGICAL HISTORY       Past Surgical History:   Procedure Laterality Date    CHOLECYSTECTOMY  03/23/2022    in [place] [facility] with Dr. Annmarie Gan, LAPAROSCOPIC N/A 3/23/2022    CHOLECYSTECTOMY LAPAROSCOPIC performed by Vijaya Garcia MD at [address] [facility]  05/08/2019    normal colonoscopy    COLONOSCOPY N/A 5/8/2019    COLORECTAL CANCER SCREENING, HIGH RISK performed by Patrizia Tarango MD at [address] [place] [address] ARTHROSCOPY Left 5/10/2021    LEFT SHOULDER ARTHROSCOPY, ROTATOR CUFF REPAIR, SUBACROMIAL DECOMPRESSION performed by Jillian Saavedra MD at [address] [place] [address]       Previous Medications    BLOOD GLUCOSE TEST STRIPS (ONETOUCH ULTRA) STRIP    CHECK BLOOD GLUCOSE TWICE DAILY    CARVEDILOL (COREG) 12.5 MG TABLET    Take 12.5 mg by mouth 2 times daily (with meals)    FUROSEMIDE (LASIX) 20 MG TABLET        GABAPENTIN (NEURONTIN) 300 MG CAPSULE    Take 300 mg by mouth 4 times daily. GLIMEPIRIDE (AMARYL) 4 MG TABLET    Take 4 mg by mouth 2 times daily     IBUPROFEN (ADVIL;MOTRIN) 800 MG TABLET    Take 1 tablet by mouth 3 times daily (with meals)    LISINOPRIL (PRINIVIL;ZESTRIL) 20 MG TABLET    Take 20 mg by mouth daily. LISINOPRIL (PRINIVIL;ZESTRIL) 40 MG TABLET        METFORMIN (GLUCOPHAGE) 1000 MG TABLET    Take 1,000 mg by mouth 2 times daily (with meals).     METHOCARBAMOL (ROBAXIN) 750 MG TABLET    TAKE 1 TABLET BY MOUTH 4 TIMES DAILY AS NEEDED    ONE TOUCH ULTRASOFT LANCETS MISC        PANTOPRAZOLE (PROTONIX) 40 MG TABLET    40 mg daily     SITAGLIPTIN (JANUVIA) 100 MG TABLET    Take 100 mg by mouth daily    TRULICITY 3.57 JX/4.5EL SOPN        TRULICITY 1.5 XB/0.7LB SOPN           ALLERGIES Patient has no known allergies. FAMILY HISTORY       Family History   Problem Relation Age of Onset    Cancer Father     Cancer Brother     Asthma Mother         copd          SOCIAL HISTORY       Social History     Socioeconomic History    Marital status:    Tobacco Use    Smoking status: Never    Smokeless tobacco: Never   Vaping Use    Vaping Use: Never used   Substance and Sexual Activity    Alcohol use: Not Currently    Drug use: No       SCREENINGS    Yossi Coma Scale  Eye Opening: Spontaneous  Best Verbal Response: Oriented  Best Motor Response: Obeys commands  Yossi Coma Scale Score: 15          PHYSICAL EXAM    (up to 7 for level 4, 8 or more for level 5)     ED Triage Vitals [08/14/22 1926]   BP Temp Temp Source Heart Rate Resp SpO2 Height Weight   (!) 179/89 98.9 °F (37.2 °C) Oral 100 16 97 % -- 225 lb (102.1 kg)       Physical Exam  Vitals reviewed. Constitutional:       Appearance: He is not ill-appearing or toxic-appearing. HENT:      Head: Normocephalic and atraumatic. Nose: No congestion or rhinorrhea. Mouth/Throat:      Mouth: Mucous membranes are moist.      Pharynx: No oropharyngeal exudate or posterior oropharyngeal erythema. Eyes:      General:         Right eye: No discharge. Left eye: No discharge. Extraocular Movements: Extraocular movements intact. Pupils: Pupils are equal, round, and reactive to light. Cardiovascular:      Rate and Rhythm: Normal rate. Pulmonary:      Comments: B/l breath sounds  No retractions, no increased work of breathing  Lower anterior chest wall tenderness to palpation bilaterally; no deformity; no crepitance  Chest:      Chest wall: Tenderness present. Abdominal:      Palpations: Abdomen is soft. Tenderness: There is abdominal tenderness. There is no guarding. Comments: Mild epigastric tenderness; no bruising; no guarding on palpation   Musculoskeletal:         General: No swelling or tenderness. Normal range of motion. Cervical back: Normal range of motion and neck supple. Skin:     General: Skin is warm. Capillary Refill: Capillary refill takes less than 2 seconds. Findings: No erythema or lesion. Neurological:      General: No focal deficit present. Mental Status: He is alert. DIAGNOSTIC RESULTS     Labs Reviewed - No data to display     RADIOLOGY:     Non-plain film images such as CT, Ultrasound and MRI are read by the radiologist. Plain radiographic images are visualized and preliminarily interpreted by the emergency physician. Interpretation per the Radiologist below, if available at the time of this note:    CT CHEST [address]    (Results Pending)   CT ABDOMEN PELVIS WO CONTRAST Additional Contrast? None    (Results Pending)         ED BEDSIDE ULTRASOUND:   Performed by ED Physician Brina Reilly MD       LABS:  Labs Reviewed - No data to display    All other labs were within normal range or not returned as of this dictation. EMERGENCY DEPARTMENT COURSE and DIFFERENTIAL DIAGNOSIS/MDM:   Vitals:    Vitals:    08/14/22 1926   BP: (!) 179/89   Pulse: 100   Resp: 16   Temp: 98.9 °F (37.2 °C)   TempSrc: Oral   SpO2: 97%   Weight: 225 lb (102.1 kg)           MDM  Number of Diagnoses or Management Options  Fall, initial encounter  Diagnosis management comments: 68-year-old male presents for evaluation of lower chest wall pain and abdominal pain sustained from a fall. Mechanical fall earlier today. He was getting out of a small trailer when he states the floor gave way and he fell forward. Did not hit his head. He did not injure his neck. No loss of consciousness. He is not amnestic about events. He has been ambulatory since the accident. He is not anticoagulated. Presents ambulatory to the emergency department a GCS of 15. Complains of pain in lower anterior chest wall and upper abdomen. Does endorse some pain with breathing.   He presents with elevated blood pressure. Vitals otherwise unremarkable. Is afebrile. No tachycardia. Respirations within normal limits. Active saturations are in the high 90s on room air. Does have reproducible lower chest wall tenderness bilaterally. No deformity noted. Does have bilateral breath sounds. CT of the chest and CT of the abdomen pelvis are obtained. Imaging is nonacute. No fractures or other traumatic injuries noted. He is treated symptomatically in the emergency department. He will continue symptomatic treatment at home. He is ambulatory without difficulty. He is discharged in stable condition with return precautions. -  Patient seen and evaluated in the emergency department. -  Triage and nursing notes reviewed and incorporated. -  Old chart records reviewed and incorporated. -  Work-up included:  See above  -  Results discussed with patient. CONSULTS:  None    PROCEDURES:  None performed unless otherwise noted below     Procedures        FINAL IMPRESSION    No diagnosis found. DISPOSITION/PLAN   DISPOSITION        PATIENT REFERRED TO:  No follow-up provider specified. DISCHARGE MEDICATIONS:  New Prescriptions    No medications on file       ED Provider Disposition Time  DISPOSITION        Appropriate personal protective equipment was worn during the patient's evaluation. These included surgical, eye protection, surgical mask or in 95 respirator and gloves. The patient was also placed in a surgical mask for the prevention of possible spread of respiratory viral illnesses. The Patient was instructed to read the package inserts with any medication that was prescribed. Major potential reactions and medication interactions were discussed. The Patient understands that there are numerous possible adverse reactions not covered.     The patient was also instructed to arrange follow-up with his or her primary care provider for review of any pending labwork or incidental findings on any

## 2022-08-15 NOTE — DISCHARGE INSTRUCTIONS
Your CT scan today did not show any fractures like rib fractures or other acute traumatic injury. You may find that measures like heat and/or ice and anti-inflammatory medications will help your symptoms. If you develop any worsening concerning symptoms, please seek immediate medical evaluation.

## 2023-01-09 NOTE — PROGRESS NOTES
Patient Name:  Braxton Epps  Patient :  1955  Patient MRN:  [id number]     Primary Oncologist: Oc Patel MD  Referring Provider: Sapna Marques MD     Date of Service: 2023      Reason for Consult:  hemochromatosis      Chief Complaint:    Chief Complaint   Patient presents with    New Patient        Patient Active Problem List:     Status post left rotator cuff repair     Cervical spondylosis with radiculopathy     HPI:   Anaya Cornejo is a pleasant 75 yo male patient who was referred for evaluation of hemochromatosis, heterozygous. She was seen by Dr Lexis Dyer at [facility] and told to have heterozygous hemochromatosis. Initial Ferritin was 800 and he has series of therapeutic phlebotomy. 2022 seen by Dr Josh Chapman was held since last visit due to low hgb at 11. He has underlying chronic renal insufficiency with creatinine at  1.7.  2022 CT CAP: no acute abnormality in the chest, abdomen or pelvis. He does not have children. No family history of cancer. He has 2 colonoscopies. Reportedly in  he has colonoscopy and 5 year follow up recommended. He is agreeable to have labs today including iron study. I advise to call for result.     Past Medical History:   Diagnosis Date    Acid reflux     Arthritis     Diabetes mellitus ([facility] [address]     dx since approx \"    Full dentures     Hemochromatosis     \"have to much iron- last time they removed some blood was in 2021\"follow with Dr Lexis Dyer in [place] for this    Hypertension     \"on medication since \" follows with PCP    Wears glasses      Past Surgical History:   Procedure Laterality Date    CHOLECYSTECTOMY  2022    in [facility] with Dr. Annmarie Gan, LAPAROSCOPIC N/A 3/23/2022    CHOLECYSTECTOMY LAPAROSCOPIC performed by Vijaya Garcia MD at [facility]  2019    normal colonoscopy    COLONOSCOPY N/A 2019    COLORECTAL CANCER SCREENING, HIGH RISK performed by Patrizia Tarango MD at [facility] OR    SHOULDER ARTHROSCOPY Left 5/10/2021    LEFT SHOULDER ARTHROSCOPY, ROTATOR CUFF REPAIR, SUBACROMIAL DECOMPRESSION performed by Emily Lawton MD at . [address] History     Tobacco Use    Smoking status: Never    Smokeless tobacco: Never   Vaping Use    Vaping Use: Never used   Substance Use Topics    Alcohol use: Not Currently    Drug use: No     Family History   Problem Relation Age of Onset    Cancer Father     Cancer Brother     Asthma Mother         copd     No Known Allergies    Current Outpatient Medications on File Prior to Visit   Medication Sig Dispense Refill    TRULICITY 4.13 MD/3.2GP SOPN       TRULICITY 1.5 IS/7.4NM SOPN       lisinopril (PRINIVIL;ZESTRIL) 40 MG tablet       pantoprazole (PROTONIX) 40 MG tablet 40 mg daily       sitaGLIPtin (JANUVIA) 100 MG tablet Take 100 mg by mouth daily      carvedilol (COREG) 12.5 MG tablet Take 12.5 mg by mouth 2 times daily (with meals)      metFORMIN (GLUCOPHAGE) 1000 MG tablet Take 1,000 mg by mouth 2 times daily (with meals). glimepiride (AMARYL) 4 MG tablet Take 4 mg by mouth 2 times daily       blood glucose test strips (ONETOUCH ULTRA) strip CHECK BLOOD GLUCOSE TWICE DAILY      ONE TOUCH ULTRASOFT LANCETS MISC        No current facility-administered medications on file prior to visit. Review of Systems:    Constitutional:  No weight loss, No fever, No chills, No night sweats. Energy level good. Eyes:  No diplopia, No transient or permanent loss of vision, No scotomata. ENT / Mouth:  No epistaxis, No dysphagia, No hoarseness, No oral ulcers, No gingival bleeding. No sore throat, No postnasal drip, No nasal drip, No mouth pain, No sinus pain, No tinnitus, Normal hearing. Cardiovascular:  No chest pain, No palpitations, No syncope, No upper extremity edema, No lower extremity edema, No calf discomfort. Respiratory:  No cough. No hemoptysis, No pleurisy, No wheezing, No dyspnea.   Gastrointestinal:  No abdominal pain, No abdominal cramping, No nausea, No vomiting, No constipation, No diarrhea, No hematochezia, No melena, No jaundice, No dyspepsia, No dysphagia. Urinary:  No dysuria, No hematuria, No urinary incontinence. Musculoskeletal:  No muscle pain, No swollen joints, No joint redness, No bone pain, No spine tenderness. Skin:  No rash, No nodules, No pruritus, No lesions. Neurologic:  No confusion, No seizures, No syncope, No tremor, No speech change, No headache, No hiccups, No abnormal gait, No sensory changes, No weakness. Psychiatric:  No depression, No anxiety, Concentration normal.  Endocrine:  No polyuria, No polydipsia, No hot flashes, No thyroid symptoms. Hematologic:  No epistaxis, No gingival bleeding, No petechiae, No ecchymosis. Lymphatic:  No lymphadenopathy, No lymphedema. Allergy / Immunologic:  No eczema, No frequent mucous infections, No frequent respiratory infections, No recurrent urticarial, No frequent skin infections.      Vital Signs: /66 (Site: Right Upper Arm, Position: Sitting, Cuff Size: Large Adult)   Pulse 94   Temp 98 °F (36.7 °C) (Temporal)   Resp 18   Ht 5' 11\" (1.803 m)   Wt 228 lb 9.6 oz (103.7 kg)   SpO2 97%   BMI 31.88 kg/m²      Physical Exam:  CONSTITUTIONAL: awake, alert, cooperative, no apparent distress   EYES: pupils equal, round and reactive to light, sclera clear and + pallor  ENT: Normocephalic, without obvious abnormality, atraumatic  NECK: supple, symmetrical, no jugular venous distension and no carotid bruits   HEMATOLOGIC/LYMPHATIC: no cervical, supraclavicular or axillary lymphadenopathy   LUNGS: no increased work of breathing and clear to auscultation   CARDIOVASCULAR: regular rate and rhythm, normal S1 and S2, no murmur noted  ABDOMEN: normal bowel sounds x 4, soft, non-distended, non-tender, no masses palpated, no hepatosplenomegaly   MUSCULOSKELETAL: full range of motion noted, tone is normal  NEUROLOGIC: awake, alert, oriented to name, place and time. Motor skills grossly intact. SKIN: Normal skin color, texture, turgor and no jaundice. appears intact   EXTREMITIES: no LE edema      Labs:  Hematology:  Lab Results   Component Value Date    WBC 8.1 01/24/2023    RBC 3.77 (L) 01/24/2023    HGB 11.3 (L) 01/24/2023    HCT 33.4 (L) 01/24/2023    MCV 88.6 01/24/2023    MCH 30.0 01/24/2023    MCHC 33.8 01/24/2023    RDW 13.0 01/24/2023     01/24/2023    MPV 10.3 01/24/2023    SEGSPCT 71.2 (H) 01/24/2023    EOSRELPCT 1.5 01/24/2023    BASOPCT 0.5 01/24/2023    LYMPHOPCT 18.0 (L) 01/24/2023    MONOPCT 8.8 (H) 01/24/2023    SEGSABS 5.7 01/24/2023    EOSABS 0.1 01/24/2023    BASOSABS 0.0 01/24/2023    LYMPHSABS 1.5 01/24/2023    MONOSABS 0.7 01/24/2023    DIFFTYPE AUTOMATED DIFFERENTIAL 01/24/2023     Chemistry:  Lab Results   Component Value Date     (L) 01/24/2023    K 4.8 01/24/2023    CL 97 (L) 01/24/2023    CO2 23 01/24/2023    BUN 33 (H) 01/24/2023    CREATININE 1.8 (H) 01/24/2023    GLUCOSE 357 (H) 01/24/2023    CALCIUM 10.5 01/24/2023    PROT 7.2 01/24/2023    LABALBU 4.4 01/24/2023    BILITOT 0.3 01/24/2023    ALKPHOS 126 01/24/2023    AST 17 01/24/2023    ALT 20 01/24/2023    LABGLOM 41 (L) 01/24/2023    GFRAA >60 05/04/2021    MG 1.8 09/24/2019    POCGLU 150 (H) 03/23/2022     Immunology:  Lab Results   Component Value Date    PROT 7.2 01/24/2023      Previous labs:  9/2019 LFTs wnl.  5/2021 WBC 7.1, Hg 12.4, MCV 91.2, plat 251.  3/2022 WBC 6.99, Hg 11.5, MCV 88, plat 279. Observations:  PHQ-9 Total Score: 0 (1/24/2023  1:40 PM)     Assessment & Plan:  1. He was diagnosed with heterozygous hemochromatosis. He has multiple phlebotomies. Therapeutic phlebotomy has been held due to anemia. I will check CBC, Iron study today. I can not find the genetic study for hemochromatosis, hence I ordered today. We discussed about low iron diet. 2. He has 2 colonoscopies.  Reportedly in 2021 he has colonoscopy and 5 year follow up recommended. 3. He has CKD. RTC in 3 weeks to discuss result. All of his questions have been answered for today. I have discussed the above stated plan with the patient and they verbalized understanding and agreed with the plan. Thank you for allowing us to participate in this patient's care.

## 2023-01-24 ENCOUNTER — INITIAL CONSULT (OUTPATIENT)
Dept: ONCOLOGY | Age: 68
End: 2023-01-24
Payer: MEDICAID

## 2023-01-24 ENCOUNTER — HOSPITAL ENCOUNTER (OUTPATIENT)
Dept: INFUSION THERAPY | Age: 68
Discharge: HOME | End: 2023-01-24
Payer: MEDICAID

## 2023-01-24 VITALS
DIASTOLIC BLOOD PRESSURE: 66 MMHG | HEART RATE: 94 BPM | SYSTOLIC BLOOD PRESSURE: 138 MMHG | RESPIRATION RATE: 18 BRPM | TEMPERATURE: 98 F | HEIGHT: 71 IN | BODY MASS INDEX: 32 KG/M2 | OXYGEN SATURATION: 97 % | WEIGHT: 228.6 LBS

## 2023-01-24 DIAGNOSIS — R79.89 HIGH SERUM FERRITIN: Primary | ICD-10-CM

## 2023-01-24 DIAGNOSIS — R79.89 HIGH SERUM FERRITIN: ICD-10-CM

## 2023-01-24 LAB
ALBUMIN SERPL-MCNC: 4.4 GM/DL (ref 3.4–5)
ALP BLD-CCNC: 126 IU/L (ref 40–129)
ALT SERPL-CCNC: 20 U/L (ref 10–40)
ANION GAP SERPL CALCULATED.3IONS-SCNC: 12 MMOL/L (ref 4–16)
AST SERPL-CCNC: 17 IU/L (ref 15–37)
BASOPHILS ABSOLUTE: 0 K/CU MM
BASOPHILS RELATIVE PERCENT: 0.5 % (ref 0–1)
BILIRUB SERPL-MCNC: 0.3 MG/DL (ref 0–1)
BUN BLDV-MCNC: 33 MG/DL (ref 6–23)
CALCIUM SERPL-MCNC: 10.5 MG/DL (ref 8.3–10.6)
CHLORIDE BLD-SCNC: 97 MMOL/L (ref 99–110)
CO2: 23 MMOL/L (ref 21–32)
CREAT SERPL-MCNC: 1.8 MG/DL (ref 0.9–1.3)
DIFFERENTIAL TYPE: ABNORMAL
EOSINOPHILS ABSOLUTE: 0.1 K/CU MM
EOSINOPHILS RELATIVE PERCENT: 1.5 % (ref 0–3)
FERRITIN: 132 NG/ML (ref 30–400)
FOLATE: 7.2 NG/ML (ref 3.1–17.5)
GFR SERPL CREATININE-BSD FRML MDRD: 41 ML/MIN/1.73M2
GLUCOSE BLD-MCNC: 357 MG/DL (ref 70–99)
HCT VFR BLD CALC: 33.4 % (ref 42–52)
HEMOGLOBIN: 11.3 GM/DL (ref 13.5–18)
IRON: 93 UG/DL (ref 59–158)
LYMPHOCYTES ABSOLUTE: 1.5 K/CU MM
LYMPHOCYTES RELATIVE PERCENT: 18 % (ref 24–44)
MCH RBC QN AUTO: 30 PG (ref 27–31)
MCHC RBC AUTO-ENTMCNC: 33.8 % (ref 32–36)
MCV RBC AUTO: 88.6 FL (ref 78–100)
MONOCYTES ABSOLUTE: 0.7 K/CU MM
MONOCYTES RELATIVE PERCENT: 8.8 % (ref 0–4)
PCT TRANSFERRIN: 30 % (ref 10–44)
PDW BLD-RTO: 13 % (ref 11.7–14.9)
PLATELET # BLD: 259 K/CU MM (ref 140–440)
PMV BLD AUTO: 10.3 FL (ref 7.5–11.1)
POTASSIUM SERPL-SCNC: 4.8 MMOL/L (ref 3.5–5.1)
RBC # BLD: 3.77 M/CU MM (ref 4.6–6.2)
SEGMENTED NEUTROPHILS ABSOLUTE COUNT: 5.7 K/CU MM
SEGMENTED NEUTROPHILS RELATIVE PERCENT: 71.2 % (ref 36–66)
SODIUM BLD-SCNC: 132 MMOL/L (ref 135–145)
TOTAL IRON BINDING CAPACITY: 310 UG/DL (ref 250–450)
TOTAL PROTEIN: 7.2 GM/DL (ref 6.4–8.2)
UNSATURATED IRON BINDING CAPACITY: 217 UG/DL (ref 110–370)
VITAMIN B-12: 409.5 PG/ML (ref 211–911)
WBC # BLD: 8.1 K/CU MM (ref 4–10.5)

## 2023-01-24 PROCEDURE — 99211 OFF/OP EST MAY X REQ PHY/QHP: CPT

## 2023-01-24 PROCEDURE — G8427 DOCREV CUR MEDS BY ELIG CLIN: HCPCS | Performed by: INTERNAL MEDICINE

## 2023-01-24 PROCEDURE — G8484 FLU IMMUNIZE NO ADMIN: HCPCS | Performed by: INTERNAL MEDICINE

## 2023-01-24 PROCEDURE — 82746 ASSAY OF FOLIC ACID SERUM: CPT

## 2023-01-24 PROCEDURE — 83540 ASSAY OF IRON: CPT

## 2023-01-24 PROCEDURE — 36415 COLL VENOUS BLD VENIPUNCTURE: CPT

## 2023-01-24 PROCEDURE — 81256 HFE GENE: CPT

## 2023-01-24 PROCEDURE — 99204 OFFICE O/P NEW MOD 45 MIN: CPT | Performed by: INTERNAL MEDICINE

## 2023-01-24 PROCEDURE — 85025 COMPLETE CBC W/AUTO DIFF WBC: CPT

## 2023-01-24 PROCEDURE — 82607 VITAMIN B-12: CPT

## 2023-01-24 PROCEDURE — 1036F TOBACCO NON-USER: CPT | Performed by: INTERNAL MEDICINE

## 2023-01-24 PROCEDURE — 1123F ACP DISCUSS/DSCN MKR DOCD: CPT | Performed by: INTERNAL MEDICINE

## 2023-01-24 PROCEDURE — 80053 COMPREHEN METABOLIC PANEL: CPT

## 2023-01-24 PROCEDURE — G8417 CALC BMI ABV UP PARAM F/U: HCPCS | Performed by: INTERNAL MEDICINE

## 2023-01-24 PROCEDURE — 83550 IRON BINDING TEST: CPT

## 2023-01-24 PROCEDURE — 3017F COLORECTAL CA SCREEN DOC REV: CPT | Performed by: INTERNAL MEDICINE

## 2023-01-24 PROCEDURE — 82728 ASSAY OF FERRITIN: CPT

## 2023-01-24 ASSESSMENT — PATIENT HEALTH QUESTIONNAIRE - PHQ9
SUM OF ALL RESPONSES TO PHQ QUESTIONS 1-9: 0
SUM OF ALL RESPONSES TO PHQ9 QUESTIONS 1 & 2: 0
SUM OF ALL RESPONSES TO PHQ QUESTIONS 1-9: 0
1. LITTLE INTEREST OR PLEASURE IN DOING THINGS: 0
SUM OF ALL RESPONSES TO PHQ QUESTIONS 1-9: 0
SUM OF ALL RESPONSES TO PHQ QUESTIONS 1-9: 0
2. FEELING DOWN, DEPRESSED OR HOPELESS: 0

## 2023-01-24 NOTE — PROGRESS NOTES
MA Rooming Questions  Patient: Jalyn Chamorro  MRN: [id number]    Date: 1/24/2023        RUDOLPH Emery CMA

## 2023-01-25 NOTE — PROGRESS NOTES
Patient Name:  Sunita Castro  Patient :  1955  Patient MRN:  [id number]     Primary Oncologist: Wally Agarwal MD  Referring Provider: Ad Sarabia MD     Date of Service: 2023      Chief Complaint:    No chief complaint on file. He came in for follow up visit. Patient Active Problem List:     Status post left rotator cuff repair     Cervical spondylosis with radiculopathy     HPI:   Beryle Lass is a pleasant 75 yo male patient who was referred for evaluation of hemochromatosis, heterozygous. She was seen by Dr Renee Kenny at [facility] and told to have heterozygous hemochromatosis. Initial Ferritin was 800 and he has series of therapeutic phlebotomy. 2022 seen by Dr Mallory Loomis was held since last visit due to low hgb at 11. He has underlying chronic renal insufficiency with creatinine at  1.7.  2022 CT CAP: no acute abnormality in the chest, abdomen or pelvis. He does not have children. No family history of cancer. He has 2 colonoscopies. Reportedly in  he has colonoscopy and 5 year follow up recommended. 2023 he came in for a follow up visit. 2023 creat 1.8, LFTs wnl. Hg 11.3, MCV 88.6, plat 259. Ferritin 132, Iron 93, TIBC 310, sat 30%. Folate 7.2, B-12 409. 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 We discussed about low Iron diet. He is agreeable to have labs prior to next OV. No acute pain. Denied any nausea, vomiting or diarrhea. No fever or chills. No chest pain, shortness of breath or palpitation. No headache or dizzy spell. No specific bone pain. No melena or hematochezia. Denied any dysuria or hematuria.     Past Medical History:   Diagnosis Date    Acid reflux     Arthritis     Diabetes mellitus ([address]     dx since approx \"    Full dentures     Hemochromatosis     \"have to much iron- last time they removed some blood was in 2021\"[place] with Dr Renee Kenny in [place] for this    Hypertension     \"on medication since \" follows with PCP Wears glasses      Past Surgical History:   Procedure Laterality Date    CHOLECYSTECTOMY  03/23/2022    in [place] with Dr. Shelia Nina, LAPAROSCOPIC N/A 3/23/2022    CHOLECYSTECTOMY LAPAROSCOPIC performed by Lashell Arrington MD at [address] [place] [address]  05/08/2019    normal colonoscopy    COLONOSCOPY N/A 5/8/2019    COLORECTAL CANCER SCREENING, HIGH RISK performed by Romelia Campuzano MD at [address] [place] [address] ARTHROSCOPY Left 5/10/2021    LEFT SHOULDER ARTHROSCOPY, ROTATOR CUFF REPAIR, SUBACROMIAL DECOMPRESSION performed by Kaitlin Rose MD at . [facility] History     Tobacco Use    Smoking status: Never    Smokeless tobacco: Never   Vaping Use    Vaping Use: Never used   Substance Use Topics    Alcohol use: Not Currently    Drug use: No     Family History   Problem Relation Age of Onset    Cancer Father     Cancer Brother     Asthma Mother         copd   Review of Systems: The remainder of ROS is unremarkable. Vital Signs: There were no vitals taken for this visit. Physical Exam:  CONSTITUTIONAL: awake, alert, cooperative, no apparent distress   EYES: pupils equal, round. Sclera clear and + pallor  ENT: Normocephalic, without obvious abnormality, atraumatic  NECK: supple, symmetrical, no jugular venous distension and no carotid bruits   HEMATOLOGIC/LYMPHATIC: no cervical, supraclavicular or axillary lymphadenopathy   LUNGS: no increased work of breathing and clear to auscultation   CARDIOVASCULAR: regular rate and rhythm, normal S1 and S2, no murmur noted  ABDOMEN: normal bowel sound, soft, non-distended, non-tender, no masses palpated, no rebound or guarding. MUSCULOSKELETAL: full range of motion noted, tone is normal  NEUROLOGIC: Motor skills grossly intact. CN II - XII grossly intact. SKIN: Normal skin color, texture, turgor and no jaundice. EXTREMITIES: no LE edema or cyanosis.      Labs:  Hematology:  Lab Results   Component Value Date    WBC 8.1 01/24/2023    RBC 3.77 (L) 01/24/2023    HGB 11.3 (L) 01/24/2023    HCT 33.4 (L) 01/24/2023    MCV 88.6 01/24/2023    MCH 30.0 01/24/2023    MCHC 33.8 01/24/2023    RDW 13.0 01/24/2023     01/24/2023    MPV 10.3 01/24/2023    SEGSPCT 71.2 (H) 01/24/2023    EOSRELPCT 1.5 01/24/2023    BASOPCT 0.5 01/24/2023    LYMPHOPCT 18.0 (L) 01/24/2023    MONOPCT 8.8 (H) 01/24/2023    SEGSABS 5.7 01/24/2023    EOSABS 0.1 01/24/2023    BASOSABS 0.0 01/24/2023    LYMPHSABS 1.5 01/24/2023    MONOSABS 0.7 01/24/2023    DIFFTYPE AUTOMATED DIFFERENTIAL 01/24/2023     Chemistry:  Lab Results   Component Value Date     (L) 01/24/2023    K 4.8 01/24/2023    CL 97 (L) 01/24/2023    CO2 23 01/24/2023    BUN 33 (H) 01/24/2023    CREATININE 1.8 (H) 01/24/2023    GLUCOSE 357 (H) 01/24/2023    CALCIUM 10.5 01/24/2023    PROT 7.2 01/24/2023    LABALBU 4.4 01/24/2023    BILITOT 0.3 01/24/2023    ALKPHOS 126 01/24/2023    AST 17 01/24/2023    ALT 20 01/24/2023    LABGLOM 41 (L) 01/24/2023    GFRAA >60 05/04/2021    MG 1.8 09/24/2019    POCGLU 150 (H) 03/23/2022     Immunology:  Lab Results   Component Value Date    PROT 7.2 01/24/2023      Previous labs:  9/2019 LFTs wnl.  5/2021 WBC 7.1, Hg 12.4, MCV 91.2, plat 251.  3/2022 WBC 6.99, Hg 11.5, MCV 88, plat 279. Observations:  PHQ-9 Total Score: 0 (1/24/2023  1:40 PM)     Assessment & Plan:  1. He was diagnosed with heterozygous hemochromatosis, C282Y/H63D. He has multiple phlebotomies. Therapeutic phlebotomy has been held due to anemia. 1/24/2023 creat 1.8, LFTs wnl. Hg 11.3, MCV 88.6, plat 259. Ferritin 132, Iron 93, TIBC 310, sat 30%. Folate 7.2, B-12 409. We discussed about low Iron diet. He is agreeable to have labs prior to next OV. 2. He has 2 colonoscopies. Reportedly in 2021 he has colonoscopy and 5 year follow up recommended. 3. He has CKD. RTC in 12 weeks to discuss result. All of his questions have been answered for today.     I have discussed the above stated plan with the patient and they verbalized understanding and agreed with the plan. Thank you for allowing us to participate in this patient's care.

## 2023-01-30 LAB
C282Y HEMOCHROMATOSIS MUT: NORMAL
H63D HEMOCHROMATOSIS MUT: NORMAL
HEMOCHROMATOSIS MUTATION C282Y/H63D: NORMAL
HFE PCR SPECIMEN: NORMAL
S65C HEMOCHROMATOSIS MUT: NEGATIVE

## 2023-02-14 ENCOUNTER — OFFICE VISIT (OUTPATIENT)
Dept: ONCOLOGY | Age: 68
End: 2023-02-14
Payer: MEDICAID

## 2023-02-14 ENCOUNTER — HOSPITAL ENCOUNTER (OUTPATIENT)
Dept: INFUSION THERAPY | Age: 68
Discharge: HOME | End: 2023-02-14
Payer: MEDICAID

## 2023-02-14 VITALS
SYSTOLIC BLOOD PRESSURE: 144 MMHG | HEART RATE: 99 BPM | OXYGEN SATURATION: 93 % | HEIGHT: 71 IN | DIASTOLIC BLOOD PRESSURE: 71 MMHG | BODY MASS INDEX: 32.9 KG/M2 | WEIGHT: 235 LBS | TEMPERATURE: 97.9 F

## 2023-02-14 DIAGNOSIS — R79.89 HIGH SERUM FERRITIN: Primary | ICD-10-CM

## 2023-02-14 PROCEDURE — G8427 DOCREV CUR MEDS BY ELIG CLIN: HCPCS | Performed by: INTERNAL MEDICINE

## 2023-02-14 PROCEDURE — G8484 FLU IMMUNIZE NO ADMIN: HCPCS | Performed by: INTERNAL MEDICINE

## 2023-02-14 PROCEDURE — 99211 OFF/OP EST MAY X REQ PHY/QHP: CPT

## 2023-02-14 PROCEDURE — G8417 CALC BMI ABV UP PARAM F/U: HCPCS | Performed by: INTERNAL MEDICINE

## 2023-02-14 PROCEDURE — 1036F TOBACCO NON-USER: CPT | Performed by: INTERNAL MEDICINE

## 2023-02-14 PROCEDURE — 1123F ACP DISCUSS/DSCN MKR DOCD: CPT | Performed by: INTERNAL MEDICINE

## 2023-02-14 PROCEDURE — 3017F COLORECTAL CA SCREEN DOC REV: CPT | Performed by: INTERNAL MEDICINE

## 2023-02-14 PROCEDURE — 99213 OFFICE O/P EST LOW 20 MIN: CPT | Performed by: INTERNAL MEDICINE

## 2023-02-14 NOTE — PROGRESS NOTES
MA Rooming Questions  Patient: Randy Hurtado  MRN: [id number]    Date: 2/14/2023        1. Do you have any new issues?   no         2. Do you need any refills on medications?    no    3. Have you had any imaging done since your last visit?   no    4. Have you been hospitalized or seen in the emergency room since your last visit here?   no    5. Did the patient have a depression screening completed today?  No    No data recorded     PHQ-9 Given to (if applicable):               PHQ-9 Score (if applicable):                     [] Positive     []  Negative              Does question #9 need addressed (if applicable)                     [] Yes    []  No               Norma Saavedra CMA

## 2023-05-09 ENCOUNTER — HOSPITAL ENCOUNTER (OUTPATIENT)
Dept: INFUSION THERAPY | Age: 68
Discharge: HOME | End: 2023-05-09
Payer: MEDICARE

## 2023-05-09 DIAGNOSIS — R79.89 HIGH SERUM FERRITIN: ICD-10-CM

## 2023-05-09 LAB
ALBUMIN SERPL-MCNC: 4.1 GM/DL (ref 3.4–5)
ALP BLD-CCNC: 118 IU/L (ref 40–129)
ALT SERPL-CCNC: 22 U/L (ref 10–40)
ANION GAP SERPL CALCULATED.3IONS-SCNC: 8 MMOL/L (ref 4–16)
AST SERPL-CCNC: 17 IU/L (ref 15–37)
BASOPHILS ABSOLUTE: 0 K/CU MM
BASOPHILS RELATIVE PERCENT: 0.5 % (ref 0–1)
BILIRUB SERPL-MCNC: 0.2 MG/DL (ref 0–1)
BUN SERPL-MCNC: 32 MG/DL (ref 6–23)
CALCIUM SERPL-MCNC: 9.9 MG/DL (ref 8.3–10.6)
CHLORIDE BLD-SCNC: 97 MMOL/L (ref 99–110)
CO2: 25 MMOL/L (ref 21–32)
CREAT SERPL-MCNC: 2 MG/DL (ref 0.9–1.3)
DIFFERENTIAL TYPE: ABNORMAL
EOSINOPHILS ABSOLUTE: 0.1 K/CU MM
EOSINOPHILS RELATIVE PERCENT: 1.7 % (ref 0–3)
FERRITIN: 93 NG/ML (ref 30–400)
GFR SERPL CREATININE-BSD FRML MDRD: 36 ML/MIN/1.73M2
GLUCOSE SERPL-MCNC: 292 MG/DL (ref 70–99)
HCT VFR BLD CALC: 33 % (ref 42–52)
HEMOGLOBIN: 11 GM/DL (ref 13.5–18)
IRON: 77 UG/DL (ref 59–158)
LYMPHOCYTES ABSOLUTE: 1.5 K/CU MM
LYMPHOCYTES RELATIVE PERCENT: 20.4 % (ref 24–44)
MCH RBC QN AUTO: 30.6 PG (ref 27–31)
MCHC RBC AUTO-ENTMCNC: 33.3 % (ref 32–36)
MCV RBC AUTO: 91.7 FL (ref 78–100)
MONOCYTES ABSOLUTE: 0.8 K/CU MM
MONOCYTES RELATIVE PERCENT: 10.7 % (ref 0–4)
PCT TRANSFERRIN: 27 % (ref 10–44)
PDW BLD-RTO: 12.5 % (ref 11.7–14.9)
PLATELET # BLD: 238 K/CU MM (ref 140–440)
PMV BLD AUTO: 10 FL (ref 7.5–11.1)
POTASSIUM SERPL-SCNC: 4.9 MMOL/L (ref 3.5–5.1)
RBC # BLD: 3.6 M/CU MM (ref 4.6–6.2)
SEGMENTED NEUTROPHILS ABSOLUTE COUNT: 5 K/CU MM
SEGMENTED NEUTROPHILS RELATIVE PERCENT: 66.7 % (ref 36–66)
SODIUM BLD-SCNC: 130 MMOL/L (ref 135–145)
TOTAL IRON BINDING CAPACITY: 282 UG/DL (ref 250–450)
TOTAL PROTEIN: 6.7 GM/DL (ref 6.4–8.2)
UNSATURATED IRON BINDING CAPACITY: 205 UG/DL (ref 110–370)
WBC # BLD: 7.6 K/CU MM (ref 4–10.5)

## 2023-05-09 PROCEDURE — 36415 COLL VENOUS BLD VENIPUNCTURE: CPT

## 2023-05-09 PROCEDURE — 80053 COMPREHEN METABOLIC PANEL: CPT

## 2023-05-09 PROCEDURE — 83540 ASSAY OF IRON: CPT

## 2023-05-09 PROCEDURE — 83550 IRON BINDING TEST: CPT

## 2023-05-09 PROCEDURE — 85025 COMPLETE CBC W/AUTO DIFF WBC: CPT

## 2023-05-09 PROCEDURE — 82728 ASSAY OF FERRITIN: CPT

## 2023-05-16 ENCOUNTER — OFFICE VISIT (OUTPATIENT)
Dept: ONCOLOGY | Age: 68
End: 2023-05-16
Payer: MEDICAID

## 2023-05-16 ENCOUNTER — HOSPITAL ENCOUNTER (OUTPATIENT)
Dept: INFUSION THERAPY | Age: 68
Discharge: HOME | End: 2023-05-16
Payer: MEDICARE

## 2023-05-16 VITALS
SYSTOLIC BLOOD PRESSURE: 164 MMHG | BODY MASS INDEX: 32.68 KG/M2 | HEART RATE: 87 BPM | TEMPERATURE: 97.9 F | HEIGHT: 71 IN | DIASTOLIC BLOOD PRESSURE: 78 MMHG | WEIGHT: 233.4 LBS | OXYGEN SATURATION: 96 %

## 2023-05-16 DIAGNOSIS — R79.89 HIGH SERUM FERRITIN: Primary | ICD-10-CM

## 2023-05-16 PROCEDURE — 3017F COLORECTAL CA SCREEN DOC REV: CPT | Performed by: INTERNAL MEDICINE

## 2023-05-16 PROCEDURE — 1123F ACP DISCUSS/DSCN MKR DOCD: CPT | Performed by: INTERNAL MEDICINE

## 2023-05-16 PROCEDURE — 99213 OFFICE O/P EST LOW 20 MIN: CPT | Performed by: INTERNAL MEDICINE

## 2023-05-16 PROCEDURE — G8427 DOCREV CUR MEDS BY ELIG CLIN: HCPCS | Performed by: INTERNAL MEDICINE

## 2023-05-16 PROCEDURE — G8417 CALC BMI ABV UP PARAM F/U: HCPCS | Performed by: INTERNAL MEDICINE

## 2023-05-16 PROCEDURE — 99211 OFF/OP EST MAY X REQ PHY/QHP: CPT

## 2023-05-16 PROCEDURE — 1036F TOBACCO NON-USER: CPT | Performed by: INTERNAL MEDICINE

## 2023-05-16 ASSESSMENT — PATIENT HEALTH QUESTIONNAIRE - PHQ9
SUM OF ALL RESPONSES TO PHQ QUESTIONS 1-9: 0
SUM OF ALL RESPONSES TO PHQ QUESTIONS 1-9: 0
1. LITTLE INTEREST OR PLEASURE IN DOING THINGS: 0
SUM OF ALL RESPONSES TO PHQ QUESTIONS 1-9: 0
SUM OF ALL RESPONSES TO PHQ QUESTIONS 1-9: 0
2. FEELING DOWN, DEPRESSED OR HOPELESS: 0
SUM OF ALL RESPONSES TO PHQ9 QUESTIONS 1 & 2: 0

## 2023-05-16 NOTE — PROGRESS NOTES
RENETTA Crowley Questions  Patient: Pastor Hernandez  MRN: [id number]    Date: 5/16/2023        1. Do you have any new issues?   no         2. Do you need any refills on medications?    no    3. Have you had any imaging done since your last visit?   no    4. Have you been hospitalized or seen in the emergency room since your last visit here?   no    5. Did the patient have a depression screening completed today?  Yes    PHQ-9 Total Score: 0 (5/16/2023 10:18 AM)       PHQ-9 Given to (if applicable):               PHQ-9 Score (if applicable):                     [] Positive     [x]  Negative              Does question #9 need addressed (if applicable)                     [] Yes    []  No               Sylvester Sterling CMA

## 2023-07-26 NOTE — PROGRESS NOTES
Patient Name:  Alvarado Sanches  Patient :  1955  Patient MRN:  [id number]     Primary Oncologist: Jarrett Medina MD  Referring Provider: Aquiles Street MD     Date of Service: 2023      Chief Complaint:    Chief Complaint   Patient presents with    Follow-up     He came in for follow up visit. Patient Active Problem List:     Status post left rotator cuff repair     Cervical spondylosis with radiculopathy     HPI:   Karen Zaldivar is a pleasant 73 yo male patient who was referred for evaluation of hemochromatosis, heterozygous. She was seen by Dr Enriquez Spine at [facility] and told to have heterozygous hemochromatosis. Initial Ferritin was 800 and he has series of therapeutic phlebotomy. 2022 seen by Dr Weber  was held since last visit due to low hgb at 11. He has underlying chronic renal insufficiency with creatinine at  1.7.  2022 CT CAP: no acute abnormality in the chest, abdomen or pelvis. He does not have children. No family history of cancer. He has 2 colonoscopies. Reportedly in  he has colonoscopy and 5 year follow up recommended. 2023 he came in for a follow up visit. 2023 creat 1.8, LFTs wnl. Hg 11.3, MCV 88.6, plat 259. Ferritin 132, Iron 93, TIBC 310, sat 30%. Folate 7.2, B-12 409. 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    2023 follow up visit  2023 LFTs wnl, Creat 2.0. WBc 7.6, Hg 11, MCV 91.7, plat 238. Ferritin 93, TIBC 282. Iron 77, sat 27%. We discussed about low Iron diet. Hg and ferritin dropped without any recent phlebotomy. I recommend to watch for any bleeding issue. He is agreeable to have labs prior to next OV. Denied any AC, anti platelet agent or NSAID. I recommend to monitor BP at home. 2023 follow up visit    8/15/2023 creat 1.8. LFTs wnl. WBC 7.5, Hg 11.1, MCV 89.6, plat 233. Ferritin 99, TIBC 287. Iron 92, sat 32%  We will defer phlebotomy since he is anemia. We again discuss about low Iron diet.   Rpt labs prior to

## 2023-08-15 ENCOUNTER — HOSPITAL ENCOUNTER (OUTPATIENT)
Dept: INFUSION THERAPY | Age: 68
Discharge: HOME | End: 2023-08-15
Payer: MEDICARE

## 2023-08-15 DIAGNOSIS — R79.89 HIGH SERUM FERRITIN: ICD-10-CM

## 2023-08-15 LAB
ALBUMIN SERPL-MCNC: 4.3 GM/DL (ref 3.4–5)
ALP BLD-CCNC: 109 IU/L (ref 40–129)
ALT SERPL-CCNC: 19 U/L (ref 10–40)
ANION GAP SERPL CALCULATED.3IONS-SCNC: 11 MMOL/L (ref 4–16)
AST SERPL-CCNC: 17 IU/L (ref 15–37)
BASOPHILS ABSOLUTE: 0.1 K/CU MM
BASOPHILS RELATIVE PERCENT: 0.7 % (ref 0–1)
BILIRUB SERPL-MCNC: 0.4 MG/DL (ref 0–1)
BUN SERPL-MCNC: 26 MG/DL (ref 6–23)
CALCIUM SERPL-MCNC: 10.2 MG/DL (ref 8.3–10.6)
CHLORIDE BLD-SCNC: 98 MMOL/L (ref 99–110)
CO2: 25 MMOL/L (ref 21–32)
CREAT SERPL-MCNC: 1.8 MG/DL (ref 0.9–1.3)
DIFFERENTIAL TYPE: ABNORMAL
EOSINOPHILS ABSOLUTE: 0.1 K/CU MM
EOSINOPHILS RELATIVE PERCENT: 1.5 % (ref 0–3)
FERRITIN: 99 NG/ML (ref 30–400)
GFR SERPL CREATININE-BSD FRML MDRD: 41 ML/MIN/1.73M2
GLUCOSE SERPL-MCNC: 174 MG/DL (ref 70–99)
HCT VFR BLD CALC: 32.7 % (ref 42–52)
HEMOGLOBIN: 11.1 GM/DL (ref 13.5–18)
IRON: 92 UG/DL (ref 59–158)
LYMPHOCYTES ABSOLUTE: 1.6 K/CU MM
LYMPHOCYTES RELATIVE PERCENT: 20.9 % (ref 24–44)
MCH RBC QN AUTO: 30.4 PG (ref 27–31)
MCHC RBC AUTO-ENTMCNC: 33.9 % (ref 32–36)
MCV RBC AUTO: 89.6 FL (ref 78–100)
MONOCYTES ABSOLUTE: 0.8 K/CU MM
MONOCYTES RELATIVE PERCENT: 11.3 % (ref 0–4)
PCT TRANSFERRIN: 32 % (ref 10–44)
PDW BLD-RTO: 12.6 % (ref 11.7–14.9)
PLATELET # BLD: 233 K/CU MM (ref 140–440)
PMV BLD AUTO: 10 FL (ref 7.5–11.1)
POTASSIUM SERPL-SCNC: 4.7 MMOL/L (ref 3.5–5.1)
RBC # BLD: 3.65 M/CU MM (ref 4.6–6.2)
SEGMENTED NEUTROPHILS ABSOLUTE COUNT: 4.9 K/CU MM
SEGMENTED NEUTROPHILS RELATIVE PERCENT: 65.6 % (ref 36–66)
SODIUM BLD-SCNC: 134 MMOL/L (ref 135–145)
TOTAL IRON BINDING CAPACITY: 287 UG/DL (ref 250–450)
TOTAL PROTEIN: 6.6 GM/DL (ref 6.4–8.2)
UNSATURATED IRON BINDING CAPACITY: 195 UG/DL (ref 110–370)
WBC # BLD: 7.5 K/CU MM (ref 4–10.5)

## 2023-08-15 PROCEDURE — 83540 ASSAY OF IRON: CPT

## 2023-08-15 PROCEDURE — 85025 COMPLETE CBC W/AUTO DIFF WBC: CPT

## 2023-08-15 PROCEDURE — 36415 COLL VENOUS BLD VENIPUNCTURE: CPT

## 2023-08-15 PROCEDURE — 83550 IRON BINDING TEST: CPT

## 2023-08-15 PROCEDURE — 82728 ASSAY OF FERRITIN: CPT

## 2023-08-15 PROCEDURE — 80053 COMPREHEN METABOLIC PANEL: CPT

## 2023-08-22 ENCOUNTER — OFFICE VISIT (OUTPATIENT)
Dept: ONCOLOGY | Age: 68
End: 2023-08-22
Payer: MEDICARE

## 2023-08-22 ENCOUNTER — HOSPITAL ENCOUNTER (OUTPATIENT)
Dept: INFUSION THERAPY | Age: 68
Discharge: HOME | End: 2023-08-22
Payer: MEDICARE

## 2023-08-22 VITALS
BODY MASS INDEX: 33.58 KG/M2 | SYSTOLIC BLOOD PRESSURE: 151 MMHG | OXYGEN SATURATION: 97 % | HEIGHT: 70 IN | DIASTOLIC BLOOD PRESSURE: 72 MMHG | TEMPERATURE: 98 F | HEART RATE: 90 BPM | WEIGHT: 234.6 LBS

## 2023-08-22 DIAGNOSIS — R79.89 HIGH SERUM FERRITIN: Primary | ICD-10-CM

## 2023-08-22 PROCEDURE — 1123F ACP DISCUSS/DSCN MKR DOCD: CPT | Performed by: INTERNAL MEDICINE

## 2023-08-22 PROCEDURE — 3017F COLORECTAL CA SCREEN DOC REV: CPT | Performed by: INTERNAL MEDICINE

## 2023-08-22 PROCEDURE — G8427 DOCREV CUR MEDS BY ELIG CLIN: HCPCS | Performed by: INTERNAL MEDICINE

## 2023-08-22 PROCEDURE — 1036F TOBACCO NON-USER: CPT | Performed by: INTERNAL MEDICINE

## 2023-08-22 PROCEDURE — G8417 CALC BMI ABV UP PARAM F/U: HCPCS | Performed by: INTERNAL MEDICINE

## 2023-08-22 PROCEDURE — 99213 OFFICE O/P EST LOW 20 MIN: CPT | Performed by: INTERNAL MEDICINE

## 2023-08-22 PROCEDURE — 99211 OFF/OP EST MAY X REQ PHY/QHP: CPT

## 2023-08-22 ASSESSMENT — PATIENT HEALTH QUESTIONNAIRE - PHQ9
2. FEELING DOWN, DEPRESSED OR HOPELESS: 0
SUM OF ALL RESPONSES TO PHQ QUESTIONS 1-9: 0
SUM OF ALL RESPONSES TO PHQ QUESTIONS 1-9: 0
1. LITTLE INTEREST OR PLEASURE IN DOING THINGS: 0
SUM OF ALL RESPONSES TO PHQ9 QUESTIONS 1 & 2: 0
SUM OF ALL RESPONSES TO PHQ QUESTIONS 1-9: 0
SUM OF ALL RESPONSES TO PHQ QUESTIONS 1-9: 0

## 2023-08-22 NOTE — PROGRESS NOTES
MA Rooming Questions  Patient: Lynette Cross  MRN: [id number]    Date: 8/22/2023        1. Do you have any new issues?   no         2. Do you need any refills on medications?    no    3. Have you had any imaging done since your last visit?   no    4. Have you been hospitalized or seen in the emergency room since your last visit here?   no    5. Did the patient have a depression screening completed today?  Yes    PHQ-9 Total Score: 0 (8/22/2023  9:59 AM)       PHQ-9 Given to (if applicable):               PHQ-9 Score (if applicable):                     [] Positive     []  Negative              Does question #9 need addressed (if applicable)                     [] Yes    []  No               Gio Gilbert MA

## 2023-11-22 ENCOUNTER — HOSPITAL ENCOUNTER (OUTPATIENT)
Dept: INFUSION THERAPY | Age: 68
Discharge: HOME | End: 2023-11-22
Payer: MEDICARE

## 2023-11-22 DIAGNOSIS — R79.89 HIGH SERUM FERRITIN: ICD-10-CM

## 2023-11-22 LAB
BASOPHILS ABSOLUTE: 0 K/CU MM
BASOPHILS RELATIVE PERCENT: 0.5 % (ref 0–1)
DIFFERENTIAL TYPE: ABNORMAL
EOSINOPHILS ABSOLUTE: 0.1 K/CU MM
EOSINOPHILS RELATIVE PERCENT: 1.9 % (ref 0–3)
FERRITIN: 105 NG/ML (ref 30–400)
HCT VFR BLD CALC: 37.3 % (ref 42–52)
HEMOGLOBIN: 11.9 GM/DL (ref 13.5–18)
IRON: 72 UG/DL (ref 59–158)
LYMPHOCYTES ABSOLUTE: 1.3 K/CU MM
LYMPHOCYTES RELATIVE PERCENT: 20.2 % (ref 24–44)
MCH RBC QN AUTO: 31 PG (ref 27–31)
MCHC RBC AUTO-ENTMCNC: 31.9 % (ref 32–36)
MCV RBC AUTO: 97.1 FL (ref 78–100)
MONOCYTES ABSOLUTE: 0.7 K/CU MM
MONOCYTES RELATIVE PERCENT: 10.6 % (ref 0–4)
PCT TRANSFERRIN: 24 % (ref 10–44)
PDW BLD-RTO: 13 % (ref 11.7–14.9)
PLATELET # BLD: 247 K/CU MM (ref 140–440)
PMV BLD AUTO: 10.4 FL (ref 7.5–11.1)
RBC # BLD: 3.84 M/CU MM (ref 4.6–6.2)
SEGMENTED NEUTROPHILS ABSOLUTE COUNT: 4.3 K/CU MM
SEGMENTED NEUTROPHILS RELATIVE PERCENT: 66.8 % (ref 36–66)
TOTAL IRON BINDING CAPACITY: 301 UG/DL (ref 250–450)
UNSATURATED IRON BINDING CAPACITY: 229 UG/DL (ref 110–370)
WBC # BLD: 6.4 K/CU MM (ref 4–10.5)

## 2023-11-22 PROCEDURE — 36415 COLL VENOUS BLD VENIPUNCTURE: CPT

## 2023-11-22 PROCEDURE — 83550 IRON BINDING TEST: CPT

## 2023-11-22 PROCEDURE — 83540 ASSAY OF IRON: CPT

## 2023-11-22 PROCEDURE — 85025 COMPLETE CBC W/AUTO DIFF WBC: CPT

## 2023-11-22 PROCEDURE — 82728 ASSAY OF FERRITIN: CPT

## 2023-11-30 ENCOUNTER — HOSPITAL ENCOUNTER (OUTPATIENT)
Dept: INFUSION THERAPY | Age: 68
Discharge: HOME | End: 2023-11-30
Payer: MEDICARE

## 2023-11-30 ENCOUNTER — OFFICE VISIT (OUTPATIENT)
Dept: ONCOLOGY | Age: 68
End: 2023-11-30
Payer: MEDICARE

## 2023-11-30 VITALS
DIASTOLIC BLOOD PRESSURE: 60 MMHG | OXYGEN SATURATION: 96 % | TEMPERATURE: 97.7 F | BODY MASS INDEX: 32.61 KG/M2 | SYSTOLIC BLOOD PRESSURE: 124 MMHG | RESPIRATION RATE: 18 BRPM | HEART RATE: 92 BPM | WEIGHT: 227.8 LBS | HEIGHT: 70 IN

## 2023-11-30 DIAGNOSIS — R79.89 HIGH SERUM FERRITIN: Primary | ICD-10-CM

## 2023-11-30 PROCEDURE — G8427 DOCREV CUR MEDS BY ELIG CLIN: HCPCS | Performed by: INTERNAL MEDICINE

## 2023-11-30 PROCEDURE — 1036F TOBACCO NON-USER: CPT | Performed by: INTERNAL MEDICINE

## 2023-11-30 PROCEDURE — 99211 OFF/OP EST MAY X REQ PHY/QHP: CPT

## 2023-11-30 PROCEDURE — 99213 OFFICE O/P EST LOW 20 MIN: CPT | Performed by: INTERNAL MEDICINE

## 2023-11-30 PROCEDURE — G8417 CALC BMI ABV UP PARAM F/U: HCPCS | Performed by: INTERNAL MEDICINE

## 2023-11-30 PROCEDURE — G8484 FLU IMMUNIZE NO ADMIN: HCPCS | Performed by: INTERNAL MEDICINE

## 2023-11-30 PROCEDURE — 3017F COLORECTAL CA SCREEN DOC REV: CPT | Performed by: INTERNAL MEDICINE

## 2023-11-30 PROCEDURE — 1123F ACP DISCUSS/DSCN MKR DOCD: CPT | Performed by: INTERNAL MEDICINE

## 2023-11-30 NOTE — PROGRESS NOTES
MA Rooming Questions  Patient: Cipriano Dixon  MRN: [id number]    Date: 11/30/2023        1. Do you have any new issues? yes - Pt saw a neurosurgeon yesterday & was told he needs a shunt. Waiting for an MRI to be scheduled         2. Do you need any refills on medications?    no    3. Have you had any imaging done since your last visit?   no    4. Have you been hospitalized or seen in the emergency room since your last visit here?   no    5. Did the patient have a depression screening completed today?  No    No data recorded     PHQ-9 Given to (if applicable):               PHQ-9 Score (if applicable):                     [] Positive     []  Negative              Does question #9 need addressed (if applicable)                     [] Yes    []  No               Viridiana Singh MA
MG 1.8 09/24/2019    POCGLU 150 (H) 03/23/2022     Immunology:  Lab Results   Component Value Date    PROT 6.6 08/15/2023      Previous labs:  9/2019 LFTs wnl.  5/2021 WBC 7.1, Hg 12.4, MCV 91.2, plat 251.  3/2022 WBC 6.99, Hg 11.5, MCV 88, plat 279. Observations:  No data recorded    Assessment & Plan:  1. He was diagnosed with heterozygous hemochromatosis, C282Y/H63D. He has multiple phlebotomies. Therapeutic phlebotomy has been held due to anemia. 1/24/2023 creat 1.8, LFTs wnl. Hg 11.3, MCV 88.6, plat 259. Ferritin 132, Iron 93, TIBC 310, sat 30%. Folate 7.2, B-12 409. We discussed about low Iron diet. 5/2023 LFTs wnl, Creat 2.0. WBc 7.6, Hg 11, MCV 91.7, plat 238. Ferritin 93, TIBC 282. Iron 77, sat 27%. 8/15/2023 creat 1.8. LFTs wnl. WBC 7.5, Hg 11.1, MCV 89.6, plat 233. Ferritin 99, TIBC 287. Iron 92, sat 32%  11/22/2023 WBC 6.4, Hg 11.9, MCV 97.1, plat 247. Ferritin 105, Iron 72, TIBC 301, sat 24. He is agreeable to have labs prior to next OV. 2. He has 2 colonoscopies. Reportedly in 2021 he has colonoscopy and 5 year follow up recommended. 3. He has CKD. RTC in 12 weeks to discuss result. All of his questions have been answered for today. I have discussed the above stated plan with the patient and they verbalized understanding and agreed with the plan. Thank you for allowing us to participate in this patient's care.

## 2024-02-09 NOTE — PROGRESS NOTES
Patient Name:  Navid Douglas  Patient :  1955  Patient MRN:  [id number]     Primary Oncologist: Traci Galvin MD  Referring Provider: Joaquín Gilbert MD     Date of Service: 3/7/2024      Chief Complaint:    Chief Complaint   Patient presents with    Follow-up     3 month follow up     He came in for follow up visit.     Patient Active Problem List:     Status post left rotator cuff repair     Cervical spondylosis with radiculopathy     HPI:   Navid Hernandez is a pleasant 68 yo male patient who was referred for evaluation of hemochromatosis, heterozygous C282Y/H63D.  She was seen by Dr Waller at [facility] and told to have heterozygous hemochromatosis. Initial Ferritin was 800 and he has series of therapeutic phlebotomy.  2022 seen by Dr Waller  Phlebotomy was held since last visit due to low hgb at 11.  He has underlying chronic renal insufficiency with creatinine at  1.7.  2022 CT CAP: no acute abnormality in the chest, abdomen or pelvis.    He does not have children. No family history of cancer.   He has 2 colonoscopies. Reportedly in  he has colonoscopy and 5 year follow up recommended.  2023 creat 1.8, LFTs wnl. Hg 11.3, MCV 88.6, plat 259. Ferritin 132, Iron 93, TIBC 310, sat 30%. Folate 7.2, B-12 409. 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  2023 LFTs wnl, Creat 2.0. WBc 7.6, Hg 11, MCV 91.7, plat 238. Ferritin 93, TIBC 282. Iron 77, sat 27%.    We discussed about low Iron diet.   Hg and ferritin dropped without any recent phlebotomy. I recommend to watch for any bleeding issue.  Denied any AC, anti platelet agent or NSAID.    8/15/2023 creat 1.8. LFTs wnl. WBC 7.5, Hg 11.1, MCV 89.6, plat 233. Ferritin 99, TIBC 287. Iron 92, sat 32%  We will defer phlebotomy since he is anemia. We again discuss about low Iron diet.  2023 WBC 6.4, Hg 11.9, MCV 97.1, plat 247. Ferritin 105, Iron 72, TIBC 301, sat 24.  We will continue to monitor CBC and iron study.  Reports he is scheduled for MRI

## 2024-02-29 ENCOUNTER — HOSPITAL ENCOUNTER (OUTPATIENT)
Dept: INFUSION THERAPY | Age: 69
Discharge: HOME | End: 2024-02-29
Payer: MEDICARE

## 2024-02-29 DIAGNOSIS — R79.89 HIGH SERUM FERRITIN: ICD-10-CM

## 2024-02-29 LAB
BASOPHILS ABSOLUTE: 0.1 K/CU MM
BASOPHILS RELATIVE PERCENT: 0.6 % (ref 0–1)
DIFFERENTIAL TYPE: ABNORMAL
EOSINOPHILS ABSOLUTE: 0.1 K/CU MM
EOSINOPHILS RELATIVE PERCENT: 1.5 % (ref 0–3)
FERRITIN: 105 NG/ML (ref 30–400)
HCT VFR BLD CALC: 35.4 % (ref 42–52)
HEMOGLOBIN: 12 GM/DL (ref 13.5–18)
IRON: 72 UG/DL (ref 59–158)
LYMPHOCYTES ABSOLUTE: 1.6 K/CU MM
LYMPHOCYTES RELATIVE PERCENT: 20.5 % (ref 24–44)
MCH RBC QN AUTO: 30.6 PG (ref 27–31)
MCHC RBC AUTO-ENTMCNC: 33.9 % (ref 32–36)
MCV RBC AUTO: 90.3 FL (ref 78–100)
MONOCYTES ABSOLUTE: 0.9 K/CU MM
MONOCYTES RELATIVE PERCENT: 10.6 % (ref 0–4)
PCT TRANSFERRIN: 24 % (ref 10–44)
PDW BLD-RTO: 12.5 % (ref 11.7–14.9)
PLATELET # BLD: 253 K/CU MM (ref 140–440)
PMV BLD AUTO: 10.6 FL (ref 7.5–11.1)
RBC # BLD: 3.92 M/CU MM (ref 4.6–6.2)
SEGMENTED NEUTROPHILS ABSOLUTE COUNT: 5.3 K/CU MM
SEGMENTED NEUTROPHILS RELATIVE PERCENT: 66.8 % (ref 36–66)
TOTAL IRON BINDING CAPACITY: 295 UG/DL (ref 250–450)
UNSATURATED IRON BINDING CAPACITY: 223 UG/DL (ref 110–370)
WBC # BLD: 8 K/CU MM (ref 4–10.5)

## 2024-02-29 PROCEDURE — 83550 IRON BINDING TEST: CPT

## 2024-02-29 PROCEDURE — 36415 COLL VENOUS BLD VENIPUNCTURE: CPT

## 2024-02-29 PROCEDURE — 83540 ASSAY OF IRON: CPT

## 2024-02-29 PROCEDURE — 82728 ASSAY OF FERRITIN: CPT

## 2024-02-29 PROCEDURE — 85025 COMPLETE CBC W/AUTO DIFF WBC: CPT

## 2024-03-07 ENCOUNTER — HOSPITAL ENCOUNTER (OUTPATIENT)
Dept: INFUSION THERAPY | Age: 69
Discharge: HOME | End: 2024-03-07
Payer: MEDICARE

## 2024-03-07 ENCOUNTER — OFFICE VISIT (OUTPATIENT)
Dept: ONCOLOGY | Age: 69
End: 2024-03-07

## 2024-03-07 VITALS
WEIGHT: 229.6 LBS | SYSTOLIC BLOOD PRESSURE: 141 MMHG | HEIGHT: 70 IN | TEMPERATURE: 98.1 F | OXYGEN SATURATION: 96 % | DIASTOLIC BLOOD PRESSURE: 67 MMHG | HEART RATE: 80 BPM | BODY MASS INDEX: 32.87 KG/M2

## 2024-03-07 DIAGNOSIS — D64.9 ANEMIA, UNSPECIFIED TYPE: Primary | ICD-10-CM

## 2024-03-07 PROCEDURE — 99211 OFF/OP EST MAY X REQ PHY/QHP: CPT

## 2024-03-07 RX ORDER — FUROSEMIDE 20 MG/1
TABLET ORAL
COMMUNITY
Start: 2024-02-27

## 2024-03-07 RX ORDER — DULAGLUTIDE 4.5 MG/.5ML
INJECTION, SOLUTION SUBCUTANEOUS
COMMUNITY
Start: 2024-01-30

## 2024-03-07 NOTE — PROGRESS NOTES
MA Rooming Questions  Patient: Navid Douglas  MRN: [id number]    Date: 3/7/2024        1. Do you have any new issues?   no         2. Do you need any refills on medications?    no    3. Have you had any imaging done since your last visit?   no    4. Have you been hospitalized or seen in the emergency room since your last visit here?   no    5. Did the patient have a depression screening completed today? No    No data recorded     PHQ-9 Given to (if applicable):               PHQ-9 Score (if applicable):                     [] Positive     []  Negative              Does question #9 need addressed (if applicable)                     [] Yes    []  No               Zofia Payton MA

## 2024-05-31 ENCOUNTER — HOSPITAL ENCOUNTER (OUTPATIENT)
Dept: INFUSION THERAPY | Age: 69
Discharge: HOME | End: 2024-05-31
Payer: MEDICARE

## 2024-05-31 DIAGNOSIS — D64.9 ANEMIA, UNSPECIFIED TYPE: ICD-10-CM

## 2024-05-31 LAB
ALBUMIN SERPL ELPH-MCNC: NORMAL GM/DL (ref 3.2–5.6)
ALPHA-1-GLOBULIN: NORMAL GM/DL (ref 0.1–0.4)
ALPHA-2-GLOBULIN: NORMAL GM/DL (ref 0.4–1.2)
BASOPHILS ABSOLUTE: 0.1 K/CU MM
BASOPHILS RELATIVE PERCENT: 0.7 % (ref 0–1)
BETA GLOBULIN: NORMAL GM/DL (ref 0.5–1.3)
DIFFERENTIAL TYPE: ABNORMAL
EOSINOPHILS ABSOLUTE: 0.2 K/CU MM
EOSINOPHILS RELATIVE PERCENT: 2.1 % (ref 0–3)
FERRITIN: 105 NG/ML (ref 30–400)
GAMMA GLOBULIN: NORMAL GM/DL (ref 0.5–1.6)
HCT VFR BLD CALC: 33.9 % (ref 42–52)
HEMOGLOBIN: 11.5 GM/DL (ref 13.5–18)
IRON: 75 UG/DL (ref 59–158)
LYMPHOCYTES ABSOLUTE: 1.5 K/CU MM
LYMPHOCYTES RELATIVE PERCENT: 20 % (ref 24–44)
MCH RBC QN AUTO: 30.9 PG (ref 27–31)
MCHC RBC AUTO-ENTMCNC: 33.9 % (ref 32–36)
MCV RBC AUTO: 91.1 FL (ref 78–100)
MONOCYTES ABSOLUTE: 0.8 K/CU MM
MONOCYTES RELATIVE PERCENT: 10.7 % (ref 0–4)
NEUTROPHILS ABSOLUTE: 5 K/CU MM
NEUTROPHILS RELATIVE PERCENT: 66.5 % (ref 36–66)
PCT TRANSFERRIN: 26 % (ref 10–44)
PDW BLD-RTO: 12.5 % (ref 11.7–14.9)
PLATELET # BLD: 244 K/CU MM (ref 140–440)
PMV BLD AUTO: 10.7 FL (ref 7.5–11.1)
RBC # BLD: 3.72 M/CU MM (ref 4.6–6.2)
SPEP INTERPRETATION: NORMAL
TOTAL IRON BINDING CAPACITY: 290 UG/DL (ref 250–450)
TOTAL PROTEIN: 6.9 GM/DL (ref 6.4–8.2)
UNSATURATED IRON BINDING CAPACITY: 215 UG/DL (ref 110–370)
WBC # BLD: 7.5 K/CU MM (ref 4–10.5)

## 2024-05-31 PROCEDURE — 83550 IRON BINDING TEST: CPT

## 2024-05-31 PROCEDURE — 82728 ASSAY OF FERRITIN: CPT

## 2024-05-31 PROCEDURE — 36415 COLL VENOUS BLD VENIPUNCTURE: CPT

## 2024-05-31 PROCEDURE — 84155 ASSAY OF PROTEIN SERUM: CPT

## 2024-05-31 PROCEDURE — 85025 COMPLETE CBC W/AUTO DIFF WBC: CPT

## 2024-05-31 PROCEDURE — 84165 PROTEIN E-PHORESIS SERUM: CPT

## 2024-05-31 PROCEDURE — 83540 ASSAY OF IRON: CPT

## 2024-05-31 PROCEDURE — 82525 ASSAY OF COPPER: CPT

## 2024-05-31 PROCEDURE — 84630 ASSAY OF ZINC: CPT

## 2024-06-02 LAB
COPPER SERPL-MCNC: 107.8 UG/DL (ref 70–140)
ZINC SERPL-MCNC: 68.1 UG/DL (ref 60–120)

## 2024-06-04 LAB
REASON FOR REJECTION: NORMAL
REJECTED TEST: NORMAL

## 2024-06-06 LAB
Lab: NORMAL
TEST NAME: NORMAL

## 2024-06-07 ENCOUNTER — OFFICE VISIT (OUTPATIENT)
Dept: ONCOLOGY | Age: 69
End: 2024-06-07
Payer: MEDICARE

## 2024-06-07 ENCOUNTER — HOSPITAL ENCOUNTER (OUTPATIENT)
Dept: INFUSION THERAPY | Age: 69
Discharge: HOME | End: 2024-06-07
Payer: MEDICARE

## 2024-06-07 VITALS
HEIGHT: 70 IN | HEART RATE: 89 BPM | DIASTOLIC BLOOD PRESSURE: 68 MMHG | OXYGEN SATURATION: 97 % | SYSTOLIC BLOOD PRESSURE: 152 MMHG | BODY MASS INDEX: 32.27 KG/M2 | TEMPERATURE: 97.6 F | WEIGHT: 225.4 LBS

## 2024-06-07 DIAGNOSIS — D64.9 ANEMIA, UNSPECIFIED TYPE: Primary | ICD-10-CM

## 2024-06-07 PROCEDURE — 3017F COLORECTAL CA SCREEN DOC REV: CPT | Performed by: INTERNAL MEDICINE

## 2024-06-07 PROCEDURE — 1123F ACP DISCUSS/DSCN MKR DOCD: CPT | Performed by: INTERNAL MEDICINE

## 2024-06-07 PROCEDURE — G8427 DOCREV CUR MEDS BY ELIG CLIN: HCPCS | Performed by: INTERNAL MEDICINE

## 2024-06-07 PROCEDURE — G8417 CALC BMI ABV UP PARAM F/U: HCPCS | Performed by: INTERNAL MEDICINE

## 2024-06-07 PROCEDURE — 1036F TOBACCO NON-USER: CPT | Performed by: INTERNAL MEDICINE

## 2024-06-07 PROCEDURE — 99213 OFFICE O/P EST LOW 20 MIN: CPT | Performed by: INTERNAL MEDICINE

## 2024-06-07 PROCEDURE — 99211 OFF/OP EST MAY X REQ PHY/QHP: CPT

## 2024-06-07 RX ORDER — TIRZEPATIDE 5 MG/.5ML
5 INJECTION, SOLUTION SUBCUTANEOUS WEEKLY
COMMUNITY
Start: 2024-06-03

## 2024-06-07 ASSESSMENT — PATIENT HEALTH QUESTIONNAIRE - PHQ9
1. LITTLE INTEREST OR PLEASURE IN DOING THINGS: NOT AT ALL
SUM OF ALL RESPONSES TO PHQ QUESTIONS 1-9: 0
2. FEELING DOWN, DEPRESSED OR HOPELESS: NOT AT ALL
SUM OF ALL RESPONSES TO PHQ9 QUESTIONS 1 & 2: 0
SUM OF ALL RESPONSES TO PHQ QUESTIONS 1-9: 0

## 2024-06-07 NOTE — PROGRESS NOTES
MA Rooming Questions  Patient: Navid Douglas  MRN: [id number]    Date: 6/7/2024        1. Do you have any new issues?   no         2. Do you need any refills on medications?    no    3. Have you had any imaging done since your last visit?   no    4. Have you been hospitalized or seen in the emergency room since your last visit here?   no    5. Did the patient have a depression screening completed today? Yes    PHQ-9 Total Score: 0 (6/7/2024  9:05 AM)       PHQ-9 Given to (if applicable):               PHQ-9 Score (if applicable):                     [] Positive     [x]  Negative              Does question #9 need addressed (if applicable)                     [] Yes    []  No               Kady North CMA      
participate in this patient's care.

## 2024-08-20 ENCOUNTER — OFFICE VISIT (OUTPATIENT)
Dept: ORTHOPEDIC SURGERY | Age: 69
End: 2024-08-20
Payer: MEDICARE

## 2024-08-20 VITALS — OXYGEN SATURATION: 98 % | HEART RATE: 76 BPM | TEMPERATURE: 99 F | RESPIRATION RATE: 17 BRPM

## 2024-08-20 DIAGNOSIS — M25.562 CHRONIC PAIN OF BOTH KNEES: Primary | ICD-10-CM

## 2024-08-20 DIAGNOSIS — M25.561 CHRONIC PAIN OF BOTH KNEES: Primary | ICD-10-CM

## 2024-08-20 DIAGNOSIS — G89.29 CHRONIC PAIN OF BOTH KNEES: Primary | ICD-10-CM

## 2024-08-20 PROCEDURE — 3017F COLORECTAL CA SCREEN DOC REV: CPT | Performed by: ORTHOPAEDIC SURGERY

## 2024-08-20 PROCEDURE — 99214 OFFICE O/P EST MOD 30 MIN: CPT | Performed by: ORTHOPAEDIC SURGERY

## 2024-08-20 PROCEDURE — G8417 CALC BMI ABV UP PARAM F/U: HCPCS | Performed by: ORTHOPAEDIC SURGERY

## 2024-08-20 PROCEDURE — 1036F TOBACCO NON-USER: CPT | Performed by: ORTHOPAEDIC SURGERY

## 2024-08-20 PROCEDURE — G8427 DOCREV CUR MEDS BY ELIG CLIN: HCPCS | Performed by: ORTHOPAEDIC SURGERY

## 2024-08-20 PROCEDURE — 1123F ACP DISCUSS/DSCN MKR DOCD: CPT | Performed by: ORTHOPAEDIC SURGERY

## 2024-08-20 ASSESSMENT — ENCOUNTER SYMPTOMS
EYE PAIN: 0
COLOR CHANGE: 0
EYE REDNESS: 0
VOMITING: 0
SHORTNESS OF BREATH: 0
WHEEZING: 0
CHEST TIGHTNESS: 0

## 2024-08-20 NOTE — PROGRESS NOTES
Patient seen in office today for: HAILEY knee pain, one is not worse than the other, pain started almost a year ago and is located globally throughout both knees .    DOI: No known injury  DOS: No Sx Hx  Date of last injection: 2/19/24    Patient reports 8/10 pain.  RICE and medication are effective to alleviate pain and reduce swelling. Pain worsened by: Patient reports painful ROM & weight bearing.     Xrays performed in office today.

## 2024-08-20 NOTE — PATIENT INSTRUCTIONS
Continue weight-bearing as tolerated.  Continue range of motion exercises as instructed.  Ice and elevate as needed.  Tylenol or Motrin for pain.  Follow up as needed    We are committed to providing you the best care possible.     If you receive a survey after visiting one of our offices, please take time to share your experience concerning your physician office visit.  These surveys are confidential and no health information about you is shared.     We are eager to improve for you and we are counting on your feedback to help make that happen. If you felt my care was outstanding, please mention me by name: Michell MEDRANO

## 2024-08-20 NOTE — PROGRESS NOTES
8/20/2024   Chief Complaint   Patient presents with    Consultation     HAILEY knee pain        History of Present Illness:                             Navid Douglas is a 68 y.o. male who presents today for evaluation of his bilateral knee pain.  He has had aching soreness globally at the anterior aspect of his knees bilaterally over the last year.  He reports difficulty with getting down into a deep knee bent position or crouching or kneeling on the ground.    He has been treated by another orthopedic physician with steroid injections which did provide some pain relief but it was short-lived less than a couple of months.  Then he had gel injections performed which provided no pain relief.  He is here today for another opinion and discussion of possible treatments.    His biggest issue recently with his mobility has been problems with headaches and tightness in his neck and back and also has had physical therapy and spinal tap procedures performed for what sounds to be consistent with hydrocephalus.  He has a spine surgeon who is seeing upcoming who he has been told may consider placement of a shunt    Patient seen in office today for: HAILEY knee pain, one is not worse than the other, pain started almost a year ago and is located globally throughout both knees .     DOI: No known injury  DOS: No Sx Hx  Date of last injection: 2/19/24     Patient reports 8/10 pain.  RICE and medication are effective to alleviate pain and reduce swelling. Pain worsened by: Patient reports painful ROM & weight bearing.      Xrays performed in office today.            Medical History  Patient's medications, allergies, past medical, surgical, social and family histories were reviewed and updated as appropriate.    Past Medical History:   Diagnosis Date    Acid reflux     Arthritis     Diabetes mellitus (HCC)     dx since approx 2015\"    Full dentures     Hemochromatosis     \"have to much iron- last time they removed some blood was in Jan

## 2024-09-03 ENCOUNTER — HOSPITAL ENCOUNTER (OUTPATIENT)
Dept: INFUSION THERAPY | Age: 69
Discharge: HOME | End: 2024-09-03
Payer: MEDICARE

## 2024-09-03 DIAGNOSIS — D64.9 ANEMIA, UNSPECIFIED TYPE: ICD-10-CM

## 2024-09-03 LAB
BASOPHILS ABSOLUTE: 0 K/CU MM
BASOPHILS RELATIVE PERCENT: 0.6 % (ref 0–1)
DIFFERENTIAL TYPE: ABNORMAL
EOSINOPHILS ABSOLUTE: 0.2 K/CU MM
EOSINOPHILS RELATIVE PERCENT: 3 % (ref 0–3)
FERRITIN: 220 NG/ML (ref 30–400)
FOLATE SERPL-MCNC: 7.9 NG/ML (ref 3.1–17.5)
HCT VFR BLD CALC: 30.6 % (ref 42–52)
HEMOGLOBIN: 10.5 GM/DL (ref 13.5–18)
IRON: 81 UG/DL (ref 59–158)
LYMPHOCYTES ABSOLUTE: 1.3 K/CU MM
LYMPHOCYTES RELATIVE PERCENT: 18.8 % (ref 24–44)
MCH RBC QN AUTO: 30.5 PG (ref 27–31)
MCHC RBC AUTO-ENTMCNC: 34.3 % (ref 32–36)
MCV RBC AUTO: 89 FL (ref 78–100)
MONOCYTES ABSOLUTE: 0.7 K/CU MM
MONOCYTES RELATIVE PERCENT: 10.5 % (ref 0–4)
NEUTROPHILS ABSOLUTE: 4.7 K/CU MM
NEUTROPHILS RELATIVE PERCENT: 67.1 % (ref 36–66)
PCT TRANSFERRIN: 31 % (ref 10–44)
PDW BLD-RTO: 12.6 % (ref 11.7–14.9)
PLATELET # BLD: 224 K/CU MM (ref 140–440)
PMV BLD AUTO: 10.4 FL (ref 7.5–11.1)
RBC # BLD: 3.44 M/CU MM (ref 4.6–6.2)
TOTAL IRON BINDING CAPACITY: 259 UG/DL (ref 250–450)
UNSATURATED IRON BINDING CAPACITY: 178 UG/DL (ref 110–370)
VITAMIN B-12: 556.1 PG/ML (ref 211–911)
WBC # BLD: 7 K/CU MM (ref 4–10.5)

## 2024-09-03 PROCEDURE — 83550 IRON BINDING TEST: CPT

## 2024-09-03 PROCEDURE — 36415 COLL VENOUS BLD VENIPUNCTURE: CPT

## 2024-09-03 PROCEDURE — 82728 ASSAY OF FERRITIN: CPT

## 2024-09-03 PROCEDURE — 83540 ASSAY OF IRON: CPT

## 2024-09-03 PROCEDURE — 85025 COMPLETE CBC W/AUTO DIFF WBC: CPT

## 2024-09-03 PROCEDURE — 82746 ASSAY OF FOLIC ACID SERUM: CPT

## 2024-09-03 PROCEDURE — 82607 VITAMIN B-12: CPT

## 2024-09-10 ENCOUNTER — OFFICE VISIT (OUTPATIENT)
Dept: ONCOLOGY | Age: 69
End: 2024-09-10
Payer: MEDICARE

## 2024-09-10 ENCOUNTER — HOSPITAL ENCOUNTER (OUTPATIENT)
Dept: INFUSION THERAPY | Age: 69
Discharge: HOME | End: 2024-09-10
Payer: MEDICARE

## 2024-09-10 VITALS
DIASTOLIC BLOOD PRESSURE: 62 MMHG | BODY MASS INDEX: 31.81 KG/M2 | SYSTOLIC BLOOD PRESSURE: 131 MMHG | TEMPERATURE: 97.7 F | HEIGHT: 70 IN | WEIGHT: 222.2 LBS | HEART RATE: 82 BPM | OXYGEN SATURATION: 97 %

## 2024-09-10 DIAGNOSIS — D64.9 ANEMIA, UNSPECIFIED TYPE: Primary | ICD-10-CM

## 2024-09-10 PROCEDURE — 99211 OFF/OP EST MAY X REQ PHY/QHP: CPT

## 2024-09-10 PROCEDURE — 99213 OFFICE O/P EST LOW 20 MIN: CPT | Performed by: INTERNAL MEDICINE

## 2024-09-10 PROCEDURE — 3017F COLORECTAL CA SCREEN DOC REV: CPT | Performed by: INTERNAL MEDICINE

## 2024-09-10 PROCEDURE — 1036F TOBACCO NON-USER: CPT | Performed by: INTERNAL MEDICINE

## 2024-09-10 PROCEDURE — G8417 CALC BMI ABV UP PARAM F/U: HCPCS | Performed by: INTERNAL MEDICINE

## 2024-09-10 PROCEDURE — 1123F ACP DISCUSS/DSCN MKR DOCD: CPT | Performed by: INTERNAL MEDICINE

## 2024-09-10 PROCEDURE — G8427 DOCREV CUR MEDS BY ELIG CLIN: HCPCS | Performed by: INTERNAL MEDICINE

## 2024-11-10 NOTE — PROGRESS NOTES
Patient Name:  Navid Douglas  Patient :  1955  Patient MRN:  [id number]     Primary Oncologist: Traci Galvin MD  Referring Provider: Joaquín Gilbert MD     Date of Service: 12/10/2024      Chief Complaint:    Chief Complaint   Patient presents with    Follow-up    Results     He came in for follow up visit.     Patient Active Problem List:     Status post left rotator cuff repair     Cervical spondylosis with radiculopathy     HPI:   Navid Hernandez is a pleasant 69 yo male patient who was referred for evaluation of hemochromatosis, heterozygous C282Y/H63D.  She was seen by Dr Waller at [facility] and told to have heterozygous hemochromatosis. Initial Ferritin was 800 and he has series of therapeutic phlebotomy.  2022 seen by Dr Waller  Phlebotomy was held since last visit due to low hgb at 11.  He has underlying chronic renal insufficiency with creatinine at  1.7.  2022 CT CAP: no acute abnormality in the chest, abdomen or pelvis.    He does not have children. No family history of cancer.   He has 2 colonoscopies. Reportedly in  he has colonoscopy and 5 year follow up recommended.  2023 creat 1.8, LFTs wnl. Hg 11.3, MCV 88.6, plat 259. Ferritin 132, Iron 93, TIBC 310, sat 30%. Folate 7.2, B-12 409. 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  2023 LFTs wnl, Creat 2.0. WBc 7.6, Hg 11, MCV 91.7, plat 238. Ferritin 93, TIBC 282. Iron 77, sat 27%.  We discussed about low Iron diet.   Hg and ferritin dropped without any recent phlebotomy. I recommend to watch for any bleeding issue.  Denied any AC, anti platelet agent or NSAID.  8/15/2023 creat 1.8. LFTs wnl. WBC 7.5, Hg 11.1, MCV 89.6, plat 233. Ferritin 99, TIBC 287. Iron 92, sat 32%  2023 WBC 6.4, Hg 11.9, MCV 97.1, plat 247. Ferritin 105, Iron 72, TIBC 301, sat 24.    Reports he is scheduled for MRI of brain for balance and headache for few months. He has FU with neurosurgeon at [facility] and neurologist.    2024 WBC 8, Hg 12, MCV 90.3,

## 2024-12-03 ENCOUNTER — HOSPITAL ENCOUNTER (OUTPATIENT)
Dept: INFUSION THERAPY | Age: 69
Discharge: HOME | End: 2024-12-03
Payer: COMMERCIAL

## 2024-12-03 DIAGNOSIS — D64.9 ANEMIA, UNSPECIFIED TYPE: ICD-10-CM

## 2024-12-03 LAB
BASOPHILS # BLD: 0.05 K/UL
BASOPHILS NFR BLD: 1 % (ref 0–1)
EOSINOPHIL # BLD: 0.17 K/UL
EOSINOPHILS RELATIVE PERCENT: 2 % (ref 0–3)
ERYTHROCYTE [DISTWIDTH] IN BLOOD BY AUTOMATED COUNT: 12.9 % (ref 11.7–14.9)
FERRITIN SERPL-MCNC: 135 NG/ML (ref 30–400)
HCT VFR BLD AUTO: 35.4 % (ref 42–52)
HGB BLD-MCNC: 11.3 G/DL (ref 13.5–18)
IRON SATN MFR SERPL: 27 % (ref 15–50)
IRON SERPL-MCNC: 82 UG/DL (ref 59–158)
LYMPHOCYTES NFR BLD: 1.76 K/UL
LYMPHOCYTES RELATIVE PERCENT: 20 % (ref 24–44)
MCH RBC QN AUTO: 30.5 PG (ref 27–31)
MCHC RBC AUTO-ENTMCNC: 31.9 G/DL (ref 32–36)
MCV RBC AUTO: 95.7 FL (ref 78–100)
MONOCYTES NFR BLD: 1 K/UL
MONOCYTES NFR BLD: 12 % (ref 0–4)
NEUTROPHILS NFR BLD: 65 % (ref 36–66)
NEUTS SEG NFR BLD: 5.65 K/UL
PLATELET # BLD AUTO: 231 K/UL (ref 140–440)
PMV BLD AUTO: 11.1 FL (ref 7.5–11.1)
RBC # BLD AUTO: 3.7 M/UL (ref 4.6–6.2)
TIBC SERPL-MCNC: 303 UG/DL (ref 260–445)
UNSATURATED IRON BINDING CAPACITY: 221 UG/DL (ref 110–370)
WBC OTHER # BLD: 8.6 K/UL (ref 4–10.5)

## 2024-12-03 PROCEDURE — 83550 IRON BINDING TEST: CPT

## 2024-12-03 PROCEDURE — 85025 COMPLETE CBC W/AUTO DIFF WBC: CPT

## 2024-12-03 PROCEDURE — 36415 COLL VENOUS BLD VENIPUNCTURE: CPT

## 2024-12-03 PROCEDURE — 83540 ASSAY OF IRON: CPT

## 2024-12-03 PROCEDURE — 82728 ASSAY OF FERRITIN: CPT

## 2024-12-05 ENCOUNTER — TRANSCRIBE ORDERS (OUTPATIENT)
Dept: ADMINISTRATIVE | Age: 69
End: 2024-12-05

## 2024-12-05 DIAGNOSIS — M25.512 ACUTE PAIN OF LEFT SHOULDER: Primary | ICD-10-CM

## 2024-12-10 ENCOUNTER — HOSPITAL ENCOUNTER (OUTPATIENT)
Dept: INFUSION THERAPY | Age: 69
Discharge: HOME | End: 2024-12-10
Payer: COMMERCIAL

## 2024-12-10 ENCOUNTER — OFFICE VISIT (OUTPATIENT)
Dept: ONCOLOGY | Age: 69
End: 2024-12-10
Payer: COMMERCIAL

## 2024-12-10 VITALS
SYSTOLIC BLOOD PRESSURE: 129 MMHG | BODY MASS INDEX: 33.73 KG/M2 | OXYGEN SATURATION: 95 % | HEART RATE: 89 BPM | WEIGHT: 235.6 LBS | TEMPERATURE: 97.9 F | DIASTOLIC BLOOD PRESSURE: 69 MMHG | HEIGHT: 70 IN

## 2024-12-10 DIAGNOSIS — E83.110 HEREDITARY HEMOCHROMATOSIS (HCC): Primary | ICD-10-CM

## 2024-12-10 PROCEDURE — 1123F ACP DISCUSS/DSCN MKR DOCD: CPT | Performed by: INTERNAL MEDICINE

## 2024-12-10 PROCEDURE — 99211 OFF/OP EST MAY X REQ PHY/QHP: CPT

## 2024-12-10 PROCEDURE — 99213 OFFICE O/P EST LOW 20 MIN: CPT | Performed by: INTERNAL MEDICINE

## 2024-12-10 ASSESSMENT — PATIENT HEALTH QUESTIONNAIRE - PHQ9
2. FEELING DOWN, DEPRESSED OR HOPELESS: NOT AT ALL
SUM OF ALL RESPONSES TO PHQ QUESTIONS 1-9: 0
1. LITTLE INTEREST OR PLEASURE IN DOING THINGS: NOT AT ALL
SUM OF ALL RESPONSES TO PHQ QUESTIONS 1-9: 0
SUM OF ALL RESPONSES TO PHQ9 QUESTIONS 1 & 2: 0

## 2024-12-10 NOTE — PROGRESS NOTES
MA Rooming Questions  Patient: Navid Douglas  MRN: [id number]    Date: 12/10/2024        1. Do you have any new issues?   no         2. Do you need any refills on medications?    no    3. Have you had any imaging done since your last visit?   yes - labs 12/3    4. Have you been hospitalized or seen in the emergency room since your last visit here?   no    5. Did the patient have a depression screening completed today? Yes    PHQ-9 Total Score: 0 (12/10/2024 11:20 AM)       PHQ-9 Given to (if applicable):               PHQ-9 Score (if applicable):                     [] Positive     []  Negative              Does question #9 need addressed (if applicable)                     [] Yes    []  No               Niesha Castillo, CMA

## 2024-12-12 ENCOUNTER — OFFICE VISIT (OUTPATIENT)
Dept: ORTHOPEDIC SURGERY | Age: 69
End: 2024-12-12
Payer: COMMERCIAL

## 2024-12-12 VITALS — HEIGHT: 70 IN | OXYGEN SATURATION: 96 % | HEART RATE: 81 BPM | BODY MASS INDEX: 33.64 KG/M2 | WEIGHT: 235 LBS

## 2024-12-12 DIAGNOSIS — S43.402A SPRAIN OF LEFT SHOULDER, UNSPECIFIED SHOULDER SPRAIN TYPE, INITIAL ENCOUNTER: Primary | ICD-10-CM

## 2024-12-12 PROCEDURE — 99213 OFFICE O/P EST LOW 20 MIN: CPT | Performed by: ORTHOPAEDIC SURGERY

## 2024-12-12 PROCEDURE — 1123F ACP DISCUSS/DSCN MKR DOCD: CPT | Performed by: ORTHOPAEDIC SURGERY

## 2024-12-12 RX ORDER — LORAZEPAM 1 MG/1
1 TABLET ORAL ONCE
Qty: 1 TABLET | Refills: 0 | Status: SHIPPED | OUTPATIENT
Start: 2024-12-12 | End: 2024-12-12

## 2024-12-12 NOTE — PROGRESS NOTES
12/12/2024   Chief Complaint   Patient presents with    Shoulder Pain     Left shoulder pain        History of Present Illness:                             Navid Douglas is a 68 y.o. male who presents today for evaluation of his left Shoulder pain and weakness.    He has been dealing with leg and knee pain and recently underwent injection from [facility] to the left knee in November 21, 2024.  He did not have any significant pain relief following the injection of his knee.    He has been working with his left shoulder trying to do more activities but feels painful and weak with rotational and lifting activities.  Pain occasionally does radiate and travel into his neck and superior and posterior aspect of his shoulder.      Patient underwent left shoulder arthroscopic rotator cuff repair performed by myself approximately 3 years ago.  He is afraid that he may have injured his previous repair because of the significant pain and weakness that he is having left shoulder.  This is similar to what he experienced in the past prior to his rotator cuff repair.            Pt returns to the office with left shoulder pain. Pt states he had surgery on the same shoulder 3 years ago. Pt states her has limited ROM. Pt states his pain fluctuates between 4-7/10. Pt states he has an MRI scheduled for tomorrow.          Medical History  Patient's medications, allergies, past medical, surgical, social and family histories were reviewed and updated as appropriate.    Past Medical History:   Diagnosis Date    Acid reflux     Arthritis     Diabetes mellitus (HCC)     dx since approx 2015\"    Full dentures     Hemochromatosis     \"have to much iron- last time they removed some blood was in Jan 2021\"follow with Dr Waller in [place] for this    Hypertension     \"on medication since 2016\" follows with PCP    Wears glasses      Past Surgical History:   Procedure Laterality Date    CHOLECYSTECTOMY  03/23/2022    in [place] with Dr. Chávez

## 2024-12-12 NOTE — PATIENT INSTRUCTIONS
Continue weight-bearing as tolerated.  Continue range of motion exercises as instructed.  Ice and elevate as needed.  Tylenol or Motrin for pain.  Follow up after MRI

## 2024-12-12 NOTE — PROGRESS NOTES
Pt returns to the office with left shoulder pain. Pt states he had surgery on the same shoulder 3 years ago. Pt states her has limited ROM. Pt states his pain fluctuates between 4-7/10. Pt states he has an MRI scheduled for tomorrow.

## 2024-12-13 ENCOUNTER — HOSPITAL ENCOUNTER (OUTPATIENT)
Dept: MRI IMAGING | Age: 69
Discharge: HOME | End: 2024-12-13
Payer: COMMERCIAL

## 2024-12-13 DIAGNOSIS — M25.512 ACUTE PAIN OF LEFT SHOULDER: ICD-10-CM

## 2024-12-13 PROCEDURE — 73221 MRI JOINT UPR EXTREM W/O DYE: CPT

## 2024-12-16 ASSESSMENT — ENCOUNTER SYMPTOMS
CHEST TIGHTNESS: 0
SHORTNESS OF BREATH: 0
COLOR CHANGE: 0
BACK PAIN: 1
EYE REDNESS: 0
WHEEZING: 0
EYE PAIN: 0
VOMITING: 0

## 2024-12-31 ENCOUNTER — TRANSCRIBE ORDERS (OUTPATIENT)
Dept: ADMINISTRATIVE | Age: 69
End: 2024-12-31

## 2024-12-31 DIAGNOSIS — M54.2 NECK PAIN: Primary | ICD-10-CM

## 2025-01-07 ENCOUNTER — OFFICE VISIT (OUTPATIENT)
Dept: ORTHOPEDIC SURGERY | Age: 70
End: 2025-01-07
Payer: COMMERCIAL

## 2025-01-07 VITALS — HEART RATE: 89 BPM | OXYGEN SATURATION: 97 %

## 2025-01-07 DIAGNOSIS — M19.012 OSTEOARTHRITIS, LOCALIZED, SHOULDER, LEFT: ICD-10-CM

## 2025-01-07 DIAGNOSIS — S43.402A SPRAIN OF LEFT SHOULDER, UNSPECIFIED SHOULDER SPRAIN TYPE, INITIAL ENCOUNTER: Primary | ICD-10-CM

## 2025-01-07 PROCEDURE — 1123F ACP DISCUSS/DSCN MKR DOCD: CPT | Performed by: ORTHOPAEDIC SURGERY

## 2025-01-07 PROCEDURE — 99213 OFFICE O/P EST LOW 20 MIN: CPT | Performed by: ORTHOPAEDIC SURGERY

## 2025-01-07 RX ORDER — PREGABALIN 75 MG/1
75 CAPSULE ORAL 2 TIMES DAILY
COMMUNITY
Start: 2024-10-09

## 2025-01-07 RX ORDER — VALACYCLOVIR HYDROCHLORIDE 1 G/1
1000 TABLET, FILM COATED ORAL 3 TIMES DAILY
COMMUNITY
Start: 2024-10-01

## 2025-01-07 RX ORDER — NITROGLYCERIN 0.4 MG/1
0.4 TABLET SUBLINGUAL
COMMUNITY
Start: 2024-09-24

## 2025-01-07 RX ORDER — INSULIN LISPRO 100 [IU]/ML
INJECTION, SOLUTION INTRAVENOUS; SUBCUTANEOUS
COMMUNITY
Start: 2024-09-25

## 2025-01-07 RX ORDER — TRAMADOL HYDROCHLORIDE 50 MG/1
50 TABLET ORAL EVERY 8 HOURS PRN
COMMUNITY
Start: 2024-12-30

## 2025-01-07 NOTE — PROGRESS NOTES
Patient returns to the office with a left shoulder MRI. Pt stated he is doing good and does not any increased pain recently.    MRI of the left shoulder completed: 12/15/24    IMPRESSION:  1. No evidence of recurrent supraspinatus tear status post interval repair .  Moderate supraspinatus tendinopathy in the critical zone.  2. Undersurface partial tearing of the infraspinatus in the critical zone. This  is new from the prior study.   3. AC joint proliferation with mild edema in the joint. No subacromial spurring.

## 2025-01-10 ASSESSMENT — ENCOUNTER SYMPTOMS
SHORTNESS OF BREATH: 0
COLOR CHANGE: 0
CHEST TIGHTNESS: 0

## 2025-01-10 NOTE — PROGRESS NOTES
1/7/2025   Chief Complaint   Patient presents with    Shoulder Pain     Left         History of Present Illness:                             Navid Douglas is a 69 y.o. male returns today for follow-up of his left shoulder.  Previously has MRI of the left shoulder is here today for a review of the results.    He continues to have problems with pain radiating from his shoulder and anterior neck area.      He has been dealing with leg and knee pain and recently underwent injection from [facility] to the left knee in November 21, 2024.  He did not have any significant pain relief following the injection of his knee.     He has been working with his left shoulder trying to do more activities but feels painful and weak with rotational and lifting activities.  Pain occasionally does radiate and travel into his neck and superior and posterior aspect of his shoulder.        Patient underwent left shoulder arthroscopic rotator cuff repair performed by myself approximately 3 years ago.  He is afraid that he may have injured his previous repair because of the significant pain and weakness that he is having left shoulder.  This is similar to what he experienced in the past prior to his rotator cuff repair.      Patient returns to the office with a left shoulder MRI. Pt stated he is doing good and does not any increased pain recently.       Medical History  Patient's medications, allergies, past medical, surgical, social and family histories were reviewed and updated as appropriate.      Review of Systems   Constitutional:  Negative for activity change and fever.   Respiratory:  Negative for chest tightness and shortness of breath.    Cardiovascular:  Negative for chest pain.   Skin:  Negative for color change.   Neurological:  Negative for dizziness.   Psychiatric/Behavioral:  The patient is not nervous/anxious.                                                Examination:  General Exam:  Vitals: Pulse 89   SpO2 97%

## 2025-01-17 ENCOUNTER — HOSPITAL ENCOUNTER (OUTPATIENT)
Dept: MRI IMAGING | Age: 70
Discharge: HOME | End: 2025-01-17
Payer: COMMERCIAL

## 2025-01-17 DIAGNOSIS — M54.2 NECK PAIN: ICD-10-CM

## 2025-01-17 PROCEDURE — 72141 MRI NECK SPINE W/O DYE: CPT

## 2025-06-10 ENCOUNTER — TELEPHONE (OUTPATIENT)
Dept: ONCOLOGY | Age: 70
End: 2025-06-10

## (undated) DEVICE — SYRINGE 20ML LL S/C 50

## (undated) DEVICE — LAPAROSCOPIC TROCAR SLEEVE/SINGLE USE: Brand: KII® OPTICAL ACCESS SYSTEM

## (undated) DEVICE — PAD,ABDOMINAL,5"X9",ST,LF,25/BX: Brand: MEDLINE INDUSTRIES, INC.

## (undated) DEVICE — ADHESIVE SKIN CLSR 0.7ML TOP DERMBND ADV

## (undated) DEVICE — 3M™ STERI-DRAPE™ U-DRAPE 1067 1067 5/BX 4BX/CS/CTN&#X20;: Brand: STERI-DRAPE™

## (undated) DEVICE — DRESSING,GAUZE,XEROFORM,CURAD,1"X8",ST: Brand: CURAD

## (undated) DEVICE — THIN OFFSET (9.0 X 0.38 X 25.0MM)

## (undated) DEVICE — 4-PORT MANIFOLD: Brand: NEPTUNE 2

## (undated) DEVICE — NEEDLE SUT PASS FOR ROT CUF LABRAL REP MULTFI SCORPION

## (undated) DEVICE — SHEATH ENDO L35CM SHFT DIA5MM DISP

## (undated) DEVICE — BLADE SHV DIA4MM RED RESECT FOR GEN DEB REM OF PERIOST FR

## (undated) DEVICE — IMMOBILIZER SHLDR SWTH UNIV DEV BLK P.A.D. III

## (undated) DEVICE — TUBING PMP L16FT MAIN DISP FOR AR-6400 AR-6475

## (undated) DEVICE — INTENDED FOR TISSUE SEPARATION, AND OTHER PROCEDURES THAT REQUIRE A SHARP SURGICAL BLADE TO PUNCTURE OR CUT.: Brand: BARD-PARKER ® STAINLESS STEEL BLADES

## (undated) DEVICE — GOWN,SIRUS,POLYRNF,BRTHSLV,LG,30/CS: Brand: MEDLINE

## (undated) DEVICE — APPLICATOR MEDICATED 26 CC SOLUTION HI LT ORNG CHLORAPREP

## (undated) DEVICE — GLOVE SURG SZ 65 CRM LTX FREE POLYISOPRENE POLYMER BEAD ANTI

## (undated) DEVICE — CABLE ELECSURG 5MM BURN PROTCT DISP AEM ENDOSHIELD

## (undated) DEVICE — CANNULA ARTHSCP L7CM DIA6MM CONIC TIP THRD NONSHIELDED DISP

## (undated) DEVICE — NEEDLE HYPO 20GA L1.5IN YEL POLYPR HUB S STL REG BVL STR

## (undated) DEVICE — PROTECTOR EYE PT SELF ADH NS OPT GRD LF

## (undated) DEVICE — ALCOHOL RUBBING ISO 16OZ 70%

## (undated) DEVICE — TUBING, SUCTION, 9/32" X 10', STRAIGHT: Brand: MEDLINE

## (undated) DEVICE — TISSUE RETRIEVAL SYSTEM: Brand: INZII RETRIEVAL SYSTEM

## (undated) DEVICE — DRAPE SHEET ULTRAGARD: Brand: MEDLINE

## (undated) DEVICE — GLOVE ORANGE PI 7 1/2   MSG9075

## (undated) DEVICE — MAT FLOOR ULTRA ABS 28X48IN

## (undated) DEVICE — SUTURE SZ 0 27IN 5/8 CIR UR-6  TAPER PT VIOLET ABSRB VICRYL J603H

## (undated) DEVICE — TROCAR: Brand: KII FIOS FIRST ENTRY

## (undated) DEVICE — GLOVE SURG SZ 65 THK91MIL LTX FREE SYN POLYISOPRENE

## (undated) DEVICE — GLOVE ORANGE PI 8   MSG9080

## (undated) DEVICE — GLOVE ORANGE PI 7   MSG9070

## (undated) DEVICE — TUBING INSUF 0.3UM FLTR W/ LUERLOCK CONN

## (undated) DEVICE — PASSER SUT DIA1.8MM 45DEG R CRV STIFF SHFT SHRP ATRAUM TIP

## (undated) DEVICE — GOWN,SIRUS,POLYRNF,BRTHSLV,XLN/XL,20/CS: Brand: MEDLINE

## (undated) DEVICE — CLIP INT L POLYMER LOK LIG HEM O LOK

## (undated) DEVICE — SET TBNG DISP TIP FOR AHTO

## (undated) DEVICE — SPONGE GZ W4XL8IN COT WVN 12 PLY

## (undated) DEVICE — BLADE CLIPPER GEN PURP NS

## (undated) DEVICE — ANESTHESIA CIRCUIT ADULT-LF: Brand: MEDLINE INDUSTRIES, INC.

## (undated) DEVICE — SOLUTION IV IRRIG POUR BRL 0.9% SODIUM CHL 2F7124

## (undated) DEVICE — SUTURE VCRL SZ 4-0 L18IN ABSRB UD L19MM PS-2 3/8 CIR PRIM J496H

## (undated) DEVICE — TOWEL,OR,DSP,ST,BLUE,STD,6/PK,12PK/CS: Brand: MEDLINE

## (undated) DEVICE — TRAY PREP DRY W/ PREM GLV 2 APPL 6 SPNG 2 UNDPD 1 OVERWRAP

## (undated) DEVICE — 3M™ STERI-DRAPE™ U-DRAPE 1015: Brand: STERI-DRAPE™

## (undated) DEVICE — GAUZE,SPONGE,4"X4",16PLY,XRAY,STRL,LF: Brand: MEDLINE

## (undated) DEVICE — WRAP POS SUPP DISP FOR L ARM

## (undated) DEVICE — WEREWOLF FLOW 90 COBLATION WAND: Brand: COBLATION

## (undated) DEVICE — SUTURE FIBERTAPE FIBERWIRE SZ 2-0 30IN NONABSORB BLU AR72377

## (undated) DEVICE — ELECTRODE ES AD CRDLSS PT RET REM POLYHESIVE

## (undated) DEVICE — Z INACTIVE USE 2660664 SOLUTION IRRIG 3000ML 0.9% SOD CHL USP UROMATIC PLAS CONT

## (undated) DEVICE — PACK SURG LAP CHOLE

## (undated) DEVICE — SUTURE ETHLN SZ 3-0 L30IN NONABSORBABLE BLK FS-1 L24MM 3/8 669H

## (undated) DEVICE — CANNULA ARTHSCP L7CM DIA7MM TRNSLUC THRD FLX W/ NO SQUIRT